# Patient Record
Sex: MALE | Race: WHITE | NOT HISPANIC OR LATINO | Employment: OTHER | URBAN - METROPOLITAN AREA
[De-identification: names, ages, dates, MRNs, and addresses within clinical notes are randomized per-mention and may not be internally consistent; named-entity substitution may affect disease eponyms.]

---

## 2016-12-31 PROCEDURE — 99000 SPECIMEN HANDLING OFFICE-LAB: CPT | Performed by: FAMILY MEDICINE

## 2016-12-31 PROCEDURE — 82274 ASSAY TEST FOR BLOOD FECAL: CPT | Mod: 90 | Performed by: FAMILY MEDICINE

## 2017-01-02 ENCOUNTER — HOSPITAL ENCOUNTER (OUTPATIENT)
Dept: OCCUPATIONAL THERAPY | Facility: CLINIC | Age: 72
Setting detail: THERAPIES SERIES
End: 2017-01-02
Attending: INTERNAL MEDICINE
Payer: COMMERCIAL

## 2017-01-02 PROCEDURE — 97110 THERAPEUTIC EXERCISES: CPT | Mod: GO | Performed by: OCCUPATIONAL THERAPIST

## 2017-01-02 PROCEDURE — 97535 SELF CARE MNGMENT TRAINING: CPT | Mod: GO | Performed by: OCCUPATIONAL THERAPIST

## 2017-01-02 PROCEDURE — 40000125 ZZHC STATISTIC OT OUTPT VISIT: Performed by: OCCUPATIONAL THERAPIST

## 2017-01-02 PROCEDURE — 97530 THERAPEUTIC ACTIVITIES: CPT | Mod: GO | Performed by: OCCUPATIONAL THERAPIST

## 2017-01-03 DIAGNOSIS — Z12.11 COLON CANCER SCREENING: ICD-10-CM

## 2017-01-03 LAB — HEMOCCULT STL QL IA: NEGATIVE

## 2017-01-09 ENCOUNTER — HOSPITAL ENCOUNTER (OUTPATIENT)
Dept: OCCUPATIONAL THERAPY | Facility: CLINIC | Age: 72
Setting detail: THERAPIES SERIES
End: 2017-01-09
Attending: INTERNAL MEDICINE
Payer: COMMERCIAL

## 2017-01-09 PROCEDURE — 40000125 ZZHC STATISTIC OT OUTPT VISIT: Performed by: OCCUPATIONAL THERAPIST

## 2017-01-09 PROCEDURE — 97110 THERAPEUTIC EXERCISES: CPT | Mod: GO | Performed by: OCCUPATIONAL THERAPIST

## 2017-02-07 ENCOUNTER — TELEPHONE (OUTPATIENT)
Dept: FAMILY MEDICINE | Facility: CLINIC | Age: 72
End: 2017-02-07

## 2017-02-07 DIAGNOSIS — I63.9 CVA (CEREBRAL VASCULAR ACCIDENT) (H): Primary | ICD-10-CM

## 2017-02-07 RX ORDER — LISINOPRIL 10 MG/1
10 TABLET ORAL DAILY
Qty: 90 TABLET | Refills: 0 | Status: SHIPPED | OUTPATIENT
Start: 2017-02-07 | End: 2017-05-09

## 2017-02-07 RX ORDER — CLOPIDOGREL BISULFATE 75 MG/1
75 TABLET ORAL DAILY
Qty: 90 TABLET | Refills: 1 | Status: SHIPPED | OUTPATIENT
Start: 2017-02-07 | End: 2017-05-09

## 2017-02-07 RX ORDER — ATORVASTATIN CALCIUM 20 MG/1
20 TABLET, FILM COATED ORAL DAILY
Qty: 90 TABLET | Refills: 0 | Status: SHIPPED | OUTPATIENT
Start: 2017-02-07 | End: 2017-05-09

## 2017-05-08 DIAGNOSIS — I63.9 CVA (CEREBRAL VASCULAR ACCIDENT) (H): ICD-10-CM

## 2017-05-08 NOTE — TELEPHONE ENCOUNTER
Pending Prescriptions:                       Disp   Refills    lisinopril (PRINIVIL/ZESTRIL) 10 MG table*90 tab*0            Sig: TAKE ONE TABLET BY MOUTH ONE TIME DAILY           Last Written Prescription Date: 02/07/2017  Last Fill Quantity: 90, # refills: 0  Last Office Visit with G, P or [facility] prescribing provider: 12/19/2016       Potassium   Date Value Ref Range Status   12/19/2016 4.4 3.4 - 5.3 mmol/L Final     Creatinine   Date Value Ref Range Status   12/19/2016 1.07 0.66 - 1.25 mg/dL Final     BP Readings from Last 3 Encounters:   12/19/16 130/84   12/12/16 150/86   08/15/12 116/84     Bob MAHANT

## 2017-05-08 NOTE — TELEPHONE ENCOUNTER
LM pt should be seen for f/u   Jeannette Nagy RN      Lisinopril/HCTZ   Last Written Prescription Date: 2/7/17  Last Fill Quantity: 90, # refills: 0  Last Office Visit with [facility], P or health prescribing provider: 12/19/16       Potassium   Date Value Ref Range Status   12/19/2016 4.4 3.4 - 5.3 mmol/L Final     Creatinine   Date Value Ref Range Status   12/19/2016 1.07 0.66 - 1.25 mg/dL Final     BP Readings from Last 3 Encounters:   12/19/16 130/84   12/12/16 150/86   08/15/12 116/84

## 2017-05-09 ENCOUNTER — OFFICE VISIT (OUTPATIENT)
Dept: FAMILY MEDICINE | Facility: CLINIC | Age: 72
End: 2017-05-09
Payer: COMMERCIAL

## 2017-05-09 VITALS
HEART RATE: 66 BPM | SYSTOLIC BLOOD PRESSURE: 110 MMHG | BODY MASS INDEX: 31.35 KG/M2 | TEMPERATURE: 98.6 F | HEIGHT: 71 IN | DIASTOLIC BLOOD PRESSURE: 70 MMHG | OXYGEN SATURATION: 98 % | WEIGHT: 223.9 LBS

## 2017-05-09 DIAGNOSIS — I63.9 CRYPTOGENIC STROKE (H): ICD-10-CM

## 2017-05-09 DIAGNOSIS — I63.9 CEREBROVASCULAR ACCIDENT (CVA), UNSPECIFIED MECHANISM (H): Primary | ICD-10-CM

## 2017-05-09 DIAGNOSIS — I10 ESSENTIAL HYPERTENSION: ICD-10-CM

## 2017-05-09 PROCEDURE — 99214 OFFICE O/P EST MOD 30 MIN: CPT | Performed by: FAMILY MEDICINE

## 2017-05-09 PROCEDURE — 80048 BASIC METABOLIC PNL TOTAL CA: CPT | Performed by: FAMILY MEDICINE

## 2017-05-09 PROCEDURE — 36415 COLL VENOUS BLD VENIPUNCTURE: CPT | Performed by: FAMILY MEDICINE

## 2017-05-09 PROCEDURE — 84460 ALANINE AMINO (ALT) (SGPT): CPT | Performed by: FAMILY MEDICINE

## 2017-05-09 RX ORDER — CLOPIDOGREL BISULFATE 75 MG/1
75 TABLET ORAL DAILY
Qty: 90 TABLET | Refills: 4 | Status: SHIPPED | OUTPATIENT
Start: 2017-05-09 | End: 2018-07-30

## 2017-05-09 RX ORDER — LISINOPRIL 10 MG/1
10 TABLET ORAL DAILY
Qty: 90 TABLET | Refills: 4 | Status: SHIPPED | OUTPATIENT
Start: 2017-05-09 | End: 2018-07-30

## 2017-05-09 RX ORDER — LISINOPRIL 10 MG/1
TABLET ORAL
Qty: 90 TABLET | Refills: 0 | OUTPATIENT
Start: 2017-05-09

## 2017-05-09 RX ORDER — ATORVASTATIN CALCIUM 20 MG/1
20 TABLET, FILM COATED ORAL DAILY
Qty: 90 TABLET | Refills: 4 | Status: SHIPPED | OUTPATIENT
Start: 2017-05-09 | End: 2018-07-30

## 2017-05-09 NOTE — MR AVS SNAPSHOT
"              After Visit Summary   5/9/2017    John Riley    MRN: [id number]           Patient Information     Date Of Birth          1945        Visit Information        Provider Department      5/9/2017 10:45 AM Cortez Kenny MD [facility] [place]        Today's Diagnoses     Cerebrovascular accident (CVA), unspecified mechanism (H)    -  1    Cryptogenic stroke (H)        Essential hypertension           Follow-ups after your visit        Who to contact     If you have questions or need follow up information about today's clinic visit or your schedule please contact [facility] directly at [phone].  Normal or non-critical lab and imaging results will be communicated to you by MyChart, letter or phone within 4 business days after the clinic has received the results. If you do not hear from us within 7 days, please contact the clinic through [employer] or phone. If you have a critical or abnormal lab result, we will notify you by phone as soon as possible.  Submit refill requests through [employer] or call your pharmacy and they will forward the refill request to us. Please allow 3 business days for your refill to be completed.          Additional Information About Your Visit        MyChart Information     [employer] gives you secure access to your electronic health record. If you see a primary care provider, you can also send messages to your care team and make appointments. If you have questions, please call your primary care clinic.  If you do not have a primary care provider, please call [phone] and they will assist you.        Care EveryWhere ID     This is your Care EveryWhere ID. This could be used by other organizations to access your [place] medical records  [id number]        Your Vitals Were     Pulse Temperature Height Pulse Oximetry BMI (Body Mass Index)       66 98.6  F (37  C) (Oral) 5' 11\" (1.803 m) 98% 31.23 kg/m2        Blood Pressure from Last 3 " Encounters:   05/09/17 110/70   12/19/16 130/84   12/12/16 150/86    Weight from Last 3 Encounters:   05/09/17 223 lb 14.4 oz (101.6 kg)   12/19/16 223 lb 12.8 oz (101.5 kg)   12/10/16 225 lb (102.1 kg)              We Performed the Following     ALT     Basic metabolic panel          Where to get your medicines      These medications were sent to [id number] - [place], [place] - [address]  [address], [facility] [address]     Phone:  [phone]     atorvastatin 20 MG tablet    clopidogrel 75 MG tablet    lisinopril 10 MG tablet          Primary Care Provider Office Phone # Fax #    Cortez Kenny [phone]       [facility] [address]  [facility] [address]        Thank you!     Thank you for choosing [facility]  for your care. Our goal is always to provide you with excellent care. Hearing back from our patients is one way we can continue to improve our services. Please take a few minutes to complete the written survey that you may receive in the mail after your visit with us. Thank you!             Your Updated Medication List - Protect others around you: Learn how to safely use, store and throw away your medicines at www.disposemymeds.org.          This list is accurate as of: 5/9/17 11:59 PM.  Always use your most recent med list.                   Brand Name Dispense Instructions for use    atorvastatin 20 MG tablet    LIPITOR    90 tablet    Take 1 tablet (20 mg) by mouth daily       CALCIUM PO      Take 1 tablet by mouth daily       clopidogrel 75 MG tablet    PLAVIX    90 tablet    Take 1 tablet (75 mg) by mouth daily       COENZYME Q-10 PO      Take 1 capsule by mouth daily       LECITHIN PO      Take 1 capsule by mouth daily Reported on 5/9/2017       lisinopril 10 MG tablet    PRINIVIL/ZESTRIL    90 tablet    Take 1 tablet (10 mg) by mouth daily       multivitamin, therapeutic with minerals Tabs tablet      Take 1 tablet by mouth  daily       TURMERIC PO      Take 1 capsule by mouth daily       vitamin B complex with vitamin C Tabs tablet      Take 1 tablet by mouth daily       VITAMIN B-12 PO      Take 1 tablet by mouth daily       VITAMIN C PO      Take 1,000 mg by mouth daily       VITAMIN D (CHOLECALCIFEROL) PO      Take by mouth daily

## 2017-05-09 NOTE — PROGRESS NOTES
"  SUBJECTIVE:                                                    John Riley is a 72 year old male who presents to clinic today for the following health issues:    Patient presents with:  Recheck Medication    Patient here in follow up of stroke occurring on 12/10/16 and hospitalization at [facility].  Patient had sudden left upper extremity weakness and left facial droop with dysarthria and expressive aphasia.  Was on aspirin prior to stroke so transition to Plavix.  Patient has had resolution of neurologic symptoms. Doing well on Plavix without bleeding.    MRI brain 12/10/16  IMPRESSION:    1. Recent posterior right frontal infarct.  2. Diffuse cerebral volume loss and cerebral white matter changes  consistent with chronic small vessel ischemic disease.    Echocardiogram normal other than PFO noted.  Otherwise had normal CT angiogram of head and neck.    Patient Active Problem List   Diagnosis     CVA (cerebral vascular accident) (H)     Advanced directives, counseling/discussion     Cryptogenic stroke (H)     History reviewed. No pertinent surgical history.    Social History   Substance Use Topics     Smoking status: Never Smoker     Smokeless tobacco: Never Used     Alcohol use Yes     Family History   Problem Relation Age of Onset     HEART DISEASE Mother            ROS:  CV: NEGATIVE for chest pain, palpitations or peripheral edema  NEURO: NEGATIVE for weakness, dizziness or paresthesias    OBJECTIVE:                                                    /70 (BP Location: Right arm, Patient Position: Chair, Cuff Size: Adult Large)  Pulse 66  Temp 98.6  F (37  C) (Oral)  Ht 5' 11\" (1.803 m)  Wt 223 lb 14.4 oz (101.6 kg)  SpO2 98%  BMI 31.23 kg/m2Body mass index is 31.23 kg/(m^2).  GENERAL APPEARANCE: healthy, alert and no distress  RESP: lungs clear to auscultation - no rales, rhonchi or wheezes  CV: regular rates and rhythm and no murmur, click or rub  NEURO: Normal strength and tone, " mentation intact, speech normal and cranial nerves 2-12 intact     ASSESSMENT/PLAN:                                                    1. Cerebrovascular accident (CVA), unspecified mechanism (H)  Continue current medication with statin, Plavix.  - atorvastatin (LIPITOR) 20 MG tablet; Take 1 tablet (20 mg) by mouth daily  Dispense: 90 tablet; Refill: 4  - clopidogrel (PLAVIX) 75 MG tablet; Take 1 tablet (75 mg) by mouth daily  Dispense: 90 tablet; Refill: 4    2. Cryptogenic stroke (H)  Continue medication treatment.    3. Essential hypertension  Currently well controlled, continue current medication with lisinopril.  - Basic metabolic panel  - ALT  - lisinopril (PRINIVIL/ZESTRIL) 10 MG tablet; Take 1 tablet (10 mg) by mouth daily  Dispense: 90 tablet; Refill: 4     Cortez Kenny MD  [facility]

## 2017-05-09 NOTE — NURSING NOTE
"Chief Complaint   Patient presents with     Recheck Medication       Initial /70 (BP Location: Right arm, Patient Position: Chair, Cuff Size: Adult Large)  Pulse 66  Temp 98.6  F (37  C) (Oral)  Ht 5' 11\" (1.803 m)  Wt 223 lb 14.4 oz (101.6 kg)  SpO2 98%  BMI 31.23 kg/m2 Estimated body mass index is 31.23 kg/(m^2) as calculated from the following:    Height as of this encounter: 5' 11\" (1.803 m).    Weight as of this encounter: 223 lb 14.4 oz (101.6 kg).  Medication Reconciliation: complete   KAT Reynoso      "

## 2017-05-10 LAB
ALT SERPL W P-5'-P-CCNC: 33 U/L (ref 0–70)
ANION GAP SERPL CALCULATED.3IONS-SCNC: 12 MMOL/L (ref 3–14)
BUN SERPL-MCNC: 17 MG/DL (ref 7–30)
CALCIUM SERPL-MCNC: 9 MG/DL (ref 8.5–10.1)
CHLORIDE SERPL-SCNC: 109 MMOL/L (ref 94–109)
CO2 SERPL-SCNC: 21 MMOL/L (ref 20–32)
CREAT SERPL-MCNC: 0.94 MG/DL (ref 0.66–1.25)
GFR SERPL CREATININE-BSD FRML MDRD: 78 ML/MIN/1.7M2
GLUCOSE SERPL-MCNC: 75 MG/DL (ref 70–99)
POTASSIUM SERPL-SCNC: 4.5 MMOL/L (ref 3.4–5.3)
SODIUM SERPL-SCNC: 142 MMOL/L (ref 133–144)

## 2018-04-13 ENCOUNTER — TELEPHONE (OUTPATIENT)
Dept: FAMILY MEDICINE | Facility: CLINIC | Age: 73
End: 2018-04-13

## 2018-04-13 NOTE — TELEPHONE ENCOUNTER
Panel Management Review      Patient has the following on his problem list: None      Composite cancer screening  Chart review shows that this patient is due/due soon for the following Fecal Colorectal (FIT)  Summary:    Patient is due/failing the following:   FIT    Action needed:   Patient needs referral/order: Fit test    Type of outreach:    Sent letter.    Questions for provider review:    None                                                                                                                                    Meg Ordaz CMA       Chart routed to none .

## 2018-04-13 NOTE — LETTER
[facility]          [address] [place] [address]                                                                                                       [phone]      John Riley  [address] [facility] [address]      Dear John,    Our records indicate that you may be due for preventative health care services.  Please make an appointment or call to set up the following tests as recommended.  If you have already had this testing done at another clinic please contact us to help us update our records to reflect the preventative care that you have had.    Colon cancer screen (colonoscopy) - Recommended every ten years for everyone age 50 and older. Screening is done by a colonoscopy every 10 years starting at age 50 or earlier if you have a family history of colon cancer.  If you cannot or do not want to have a colonoscopy you can opt to do an annual fecal occult blood immunoassay test (FIT stool test). We strongly urge our patients to consider having a colonoscopy done, which is the best screening test available and only needs to be done every 10 years if normal.                                                                                                                                               Thank you for choosing [facility]. We appreciate the opportunity to serve you and look forward to supporting your healthcare needs in the future.    If you have any questions or concerns, please contact us at [phone]      Sincerely,      Cortez Kenny MD

## 2018-07-26 DIAGNOSIS — I10 ESSENTIAL HYPERTENSION: ICD-10-CM

## 2018-07-26 DIAGNOSIS — I63.9 CEREBROVASCULAR ACCIDENT (CVA), UNSPECIFIED MECHANISM (H): ICD-10-CM

## 2018-07-26 RX ORDER — ATORVASTATIN CALCIUM 20 MG/1
TABLET, FILM COATED ORAL
Qty: 90 TABLET | Refills: 3 | OUTPATIENT
Start: 2018-07-26

## 2018-07-26 RX ORDER — CLOPIDOGREL BISULFATE 75 MG/1
TABLET ORAL
Qty: 90 TABLET | Refills: 3 | OUTPATIENT
Start: 2018-07-26

## 2018-07-26 RX ORDER — LISINOPRIL 10 MG/1
10 TABLET ORAL DAILY
Qty: 90 TABLET | Refills: 4 | OUTPATIENT
Start: 2018-07-26

## 2018-07-26 NOTE — TELEPHONE ENCOUNTER
Spoke with pt and scheduled OV for 7/30/18.  Pt does not need a refill prior to appt  Freddy Serrano RN, BSN

## 2018-07-26 NOTE — TELEPHONE ENCOUNTER
"Requested Prescriptions   Pending Prescriptions Disp Refills     lisinopril (PRINIVIL/ZESTRIL) 10 MG tablet 90 tablet 4    Last Written Prescription Date:  05/19/2017  Last Fill Quantity: 90 tablet,  # refills: 4   Last office visit: 5/9/2017 with prescribing provider:  05/19/2017   Future Office Visit:     Sig: Take 1 tablet (10 mg) by mouth daily    ACE Inhibitors (Including Combos) Protocol Failed    7/26/2018  9:16 AM       Failed - Blood pressure under 140/90 in past 12 months    BP Readings from Last 3 Encounters:   05/09/17 110/70   12/19/16 130/84   12/12/16 150/86                Failed - Recent (12 mo) or future (30 days) visit within the authorizing provider's specialty    Patient had office visit in the last 12 months or has a visit in the next 30 days with authorizing provider or within the authorizing provider's specialty.  See \"Patient Info\" tab in inbasket, or \"Choose Columns\" in Meds & Orders section of the refill encounter.           Failed - Normal serum creatinine on file in past 12 months    Recent Labs   Lab Test  05/09/17   1106   CR  0.94            Failed - Normal serum potassium on file in past 12 months    Recent Labs   Lab Test  05/09/17   1106   POTASSIUM  4.5            Passed - Patient is age 18 or older          Bob HARTMAN  "

## 2018-07-26 NOTE — TELEPHONE ENCOUNTER
"Requested Prescriptions   Pending Prescriptions Disp Refills     clopidogrel (PLAVIX) 75 MG tablet [Pharmacy Med Name: Clopidogrel Bisulfate Oral Tablet 75 MG] 90 tablet 3    Last Written Prescription Date:  05/09/2017  Last Fill Quantity: 90 tablet,  # refills: 4   Last office visit: 5/9/2017 with prescribing provider:  05/09/2017   Future Office Visit:     Sig: TAKE ONE TABLET BY MOUTH EVERY DAY    Plavix Failed    7/26/2018  7:41 AM       Failed - Normal HGB on file in past 12 months    Recent Labs   Lab Test  12/10/16   1905   HGB  16.9              Failed - Normal Platelets on file in past 12 months    Recent Labs   Lab Test  12/10/16   1905   PLT  235              Failed - Recent (12 mo) or future (30 days) visit within the authorizing provider's specialty    Patient had office visit in the last 12 months or has a visit in the next 30 days with authorizing provider or within the authorizing provider's specialty.  See \"Patient Info\" tab in [employer], or \"Choose Columns\" in Meds & Orders section of the refill encounter.           Passed - No active PPI on record unless is Protonix       Passed - Patient is age 18 or older              atorvastatin (LIPITOR) 20 MG tablet [Pharmacy Med Name: Atorvastatin Calcium Oral Tablet 20 MG] 90 tablet 3    Last Written Prescription Date:  05/09/2017  Last Fill Quantity: 90 tablet,  # refills: 4   Last office visit: 5/9/2017 with prescribing provider:  05/09/2017   Future Office Visit:     Sig: TAKE ONE TABLET BY MOUTH EVERY DAY    Statins Protocol Failed    7/26/2018  7:41 AM       Failed - LDL on file in past 12 months    Recent Labs   Lab Test  12/19/16   1124   LDL  98            Failed - Recent (12 mo) or future (30 days) visit within the authorizing provider's specialty    Patient had office visit in the last 12 months or has a visit in the next 30 days with authorizing provider or within the authorizing provider's specialty.  See \"Patient Info\" tab in [employer], or " "\"Choose Columns\" in Meds & Orders section of the refill encounter.           Passed - No abnormal creatine kinase in past 12 months    No lab results found.            Passed - Patient is age 18 or older          Bob MAHANT  "

## 2018-07-30 ENCOUNTER — OFFICE VISIT (OUTPATIENT)
Dept: FAMILY MEDICINE | Facility: CLINIC | Age: 73
End: 2018-07-30
Payer: COMMERCIAL

## 2018-07-30 VITALS
SYSTOLIC BLOOD PRESSURE: 120 MMHG | WEIGHT: 218.2 LBS | BODY MASS INDEX: 30.55 KG/M2 | HEART RATE: 71 BPM | HEIGHT: 71 IN | DIASTOLIC BLOOD PRESSURE: 72 MMHG | OXYGEN SATURATION: 97 %

## 2018-07-30 DIAGNOSIS — H61.22 IMPACTED CERUMEN OF LEFT EAR: ICD-10-CM

## 2018-07-30 DIAGNOSIS — I10 ESSENTIAL HYPERTENSION: Primary | ICD-10-CM

## 2018-07-30 DIAGNOSIS — I63.9 CEREBROVASCULAR ACCIDENT (CVA), UNSPECIFIED MECHANISM (H): ICD-10-CM

## 2018-07-30 DIAGNOSIS — Z12.11 COLON CANCER SCREENING: ICD-10-CM

## 2018-07-30 DIAGNOSIS — I63.9 CRYPTOGENIC STROKE (H): ICD-10-CM

## 2018-07-30 DIAGNOSIS — Z23 NEED FOR PNEUMOCOCCAL VACCINATION: ICD-10-CM

## 2018-07-30 LAB
ERYTHROCYTE [DISTWIDTH] IN BLOOD BY AUTOMATED COUNT: 12.2 % (ref 10–15)
HCT VFR BLD AUTO: 45.7 % (ref 40–53)
HGB BLD-MCNC: 15.7 G/DL (ref 13.3–17.7)
MCH RBC QN AUTO: 34.3 PG (ref 26.5–33)
MCHC RBC AUTO-ENTMCNC: 34.4 G/DL (ref 31.5–36.5)
MCV RBC AUTO: 100 FL (ref 78–100)
PLATELET # BLD AUTO: 232 10E9/L (ref 150–450)
RBC # BLD AUTO: 4.58 10E12/L (ref 4.4–5.9)
WBC # BLD AUTO: 5.6 10E9/L (ref 4–11)

## 2018-07-30 PROCEDURE — 85027 COMPLETE CBC AUTOMATED: CPT | Performed by: FAMILY MEDICINE

## 2018-07-30 PROCEDURE — 99214 OFFICE O/P EST MOD 30 MIN: CPT | Mod: 25 | Performed by: FAMILY MEDICINE

## 2018-07-30 PROCEDURE — G0009 ADMIN PNEUMOCOCCAL VACCINE: HCPCS | Performed by: FAMILY MEDICINE

## 2018-07-30 PROCEDURE — 84460 ALANINE AMINO (ALT) (SGPT): CPT | Performed by: FAMILY MEDICINE

## 2018-07-30 PROCEDURE — 36415 COLL VENOUS BLD VENIPUNCTURE: CPT | Performed by: FAMILY MEDICINE

## 2018-07-30 PROCEDURE — 80048 BASIC METABOLIC PNL TOTAL CA: CPT | Performed by: FAMILY MEDICINE

## 2018-07-30 PROCEDURE — 90670 PCV13 VACCINE IM: CPT | Performed by: FAMILY MEDICINE

## 2018-07-30 PROCEDURE — 83721 ASSAY OF BLOOD LIPOPROTEIN: CPT | Performed by: FAMILY MEDICINE

## 2018-07-30 RX ORDER — CLOPIDOGREL BISULFATE 75 MG/1
75 TABLET ORAL DAILY
Qty: 90 TABLET | Refills: 4 | Status: SHIPPED | OUTPATIENT
Start: 2018-07-30 | End: 2019-07-29

## 2018-07-30 RX ORDER — LISINOPRIL 10 MG/1
10 TABLET ORAL DAILY
Qty: 90 TABLET | Refills: 4 | Status: SHIPPED | OUTPATIENT
Start: 2018-07-30 | End: 2019-07-29

## 2018-07-30 RX ORDER — ATORVASTATIN CALCIUM 20 MG/1
20 TABLET, FILM COATED ORAL DAILY
Qty: 90 TABLET | Refills: 4 | Status: SHIPPED | OUTPATIENT
Start: 2018-07-30 | End: 2019-07-29

## 2018-07-30 NOTE — NURSING NOTE
Screening Questionnaire for Adult Immunization    Are you sick today?   No   Do you have allergies to medications, food, a vaccine component or latex?   No   Have you ever had a serious reaction after receiving a vaccination?   No   Do you have a long-term health problem with heart disease, lung disease, asthma, kidney disease, metabolic disease (e.g. diabetes), anemia, or other blood disorder?   No   Do you have cancer, leukemia, HIV/AIDS, or any other immune system problem?   No   In the past 3 months, have you taken medications that affect  your immune system, such as prednisone, other steroids, or anticancer drugs; drugs for the treatment of rheumatoid arthritis, Crohn s disease, or psoriasis; or have you had radiation treatments?   No   Have you had a seizure, or a brain or other nervous system problem?   No   During the past year, have you received a transfusion of blood or blood     products, or been given immune (gamma) globulin or antiviral drug?   No   For women: Are you pregnant or is there a chance you could become        pregnant during the next month?   No   Have you received any vaccinations in the past 4 weeks?   No     Immunization questionnaire answers were all negative.      MNVFC doesn't apply on this patient    Per orders of Dr. Kenny, injection of Prevnar 13 given by Agnes Reed. Patient instructed to remain in clinic for 20 minutes afterwards, and to report any adverse reaction to me immediately.       Screening performed by Agnes Reed on 7/30/2018 at 10:16 AM.

## 2018-07-30 NOTE — MR AVS SNAPSHOT
After Visit Summary   7/30/2018    John Riley    MRN: [id number]           Patient Information     Date Of Birth          1945        Visit Information        Provider Department      7/30/2018 9:40 AM Cortez Kenny MD [facility]        Today's Diagnoses     Essential hypertension    -  1    Cerebrovascular accident (CVA), unspecified mechanism (H)        Cryptogenic stroke (H)        Colon cancer screening        Need for pneumococcal vaccination        Impacted cerumen of left ear           Follow-ups after your visit        Future tests that were ordered for you today     Open Future Orders        Priority Expected Expires Ordered    Fecal colorectal cancer screen FIT Routine 8/20/2018 10/22/2018 7/30/2018            Who to contact     If you have questions or need follow up information about today's clinic visit or your schedule please contact [facility] directly at [phone].  Normal or non-critical lab and imaging results will be communicated to you by [employer], letter or phone within 4 business days after the clinic has received the results. If you do not hear from us within 7 days, please contact the clinic through [employer] or phone. If you have a critical or abnormal lab result, we will notify you by phone as soon as possible.  Submit refill requests through "[employer] or call your pharmacy and they will forward the refill request to us. Please allow 3 business days for your refill to be completed.          Additional Information About Your Visit        MyChart Information     [employer] gives you secure access to your electronic health record. If you see a primary care provider, you can also send messages to your care team and make appointments. If you have questions, please call your primary care clinic.  If you do not have a primary care provider, please call [phone] and they will assist you.        Care EveryWhere ID     This is your Care  "EveryWhere ID. This could be used by other organizations to access your [place] medical records  [id number]        Your Vitals Were     Pulse Height Pulse Oximetry BMI (Body Mass Index)          71 5' 11\" (1.803 m) 97% 30.43 kg/m2         Blood Pressure from Last 3 Encounters:   07/30/18 120/72   05/09/17 110/70   12/19/16 130/84    Weight from Last 3 Encounters:   07/30/18 218 lb 3.2 oz (99 kg)   05/09/17 223 lb 14.4 oz (101.6 kg)   12/19/16 223 lb 12.8 oz (101.5 kg)              We Performed the Following     ALT     Basic metabolic panel     CBC with platelets     LDL cholesterol direct     PNEUMOCOCCAL CONJ VACCINE 13 VALENT IM          Where to get your medicines      These medications were sent to [id number] - [place], [place] - [address]  [address], [facility] [address]     Phone:  [phone]     atorvastatin 20 MG tablet    clopidogrel 75 MG tablet    lisinopril 10 MG tablet          Primary Care Provider Office Phone # Fax #    Cortez Kenny [phone] [address]  [facility] [address]        Equal Access to Services     DORENE MEZA AH: Hadii meaghan ku hadasho Soomaali, waaxda luqadaha, qaybta kaalmada adeegyada, nilesh ram haytorin minna marin. So [facility] [phone].    ATENCIÓN: Si habla español, tiene a bingham disposición servicios gratuitos de asistencia lingüística. Llame al [phone].    We comply with applicable federal civil rights laws and [place] laws. We do not discriminate on the basis of race, color, national origin, age, disability, sex, sexual orientation, or gender identity.            Thank you!     Thank you for choosing [facility]  for your care. Our goal is always to provide you with excellent care. Hearing back from our patients is one way we can continue to improve our services. Please take a few minutes to complete the written survey that you may receive in the mail after your visit with us. Thank you!      "        Your Updated Medication List - Protect others around you: Learn how to safely use, store and throw away your medicines at www.disposemymeds.org.          This list is accurate as of 7/30/18 10:01 AM.  Always use your most recent med list.                   Brand Name Dispense Instructions for use Diagnosis    atorvastatin 20 MG tablet    LIPITOR    90 tablet    Take 1 tablet (20 mg) by mouth daily    Cerebrovascular accident (CVA), unspecified mechanism (H)       CALCIUM PO      Take 1 tablet by mouth daily        clopidogrel 75 MG tablet    PLAVIX    90 tablet    Take 1 tablet (75 mg) by mouth daily    Cerebrovascular accident (CVA), unspecified mechanism (H)       COENZYME Q-10 PO      Take 1 capsule by mouth daily        LECITHIN PO      Take 1 capsule by mouth daily Reported on 5/9/2017        lisinopril 10 MG tablet    PRINIVIL/ZESTRIL    90 tablet    Take 1 tablet (10 mg) by mouth daily    Essential hypertension       multivitamin, therapeutic with minerals Tabs tablet      Take 1 tablet by mouth daily        TURMERIC PO      Take 1 capsule by mouth daily        vitamin B complex with vitamin C Tabs tablet      Take 1 tablet by mouth daily        VITAMIN B-12 PO      Take 1 tablet by mouth daily        VITAMIN C PO      Take 1,000 mg by mouth daily        VITAMIN D (CHOLECALCIFEROL) PO      Take by mouth daily

## 2018-07-30 NOTE — PROGRESS NOTES
SUBJECTIVE:   John Riley is a 73 year old male who presents to clinic today for the following health issues:    Patient with history of stroke, occurring on 12/10/16.  Patient had sudden left upper extremity weakness and left facial droop with dysarthria and expressive aphasia.  Was on aspirin prior to stroke so transition to Plavix. Taking statin as well. Patient has had resolution of neurologic symptoms. Doing well on Plavix without bleeding. Echocardiogram normal other than PFO noted.  Otherwise had normal CT angiogram of head and neck.     MRI brain 12/10/16  IMPRESSION:    1. Recent posterior right frontal infarct.  2. Diffuse cerebral volume loss and cerebral white matter changes  consistent with chronic small vessel ischemic disease.    Patient with history of hypertension. Well controlled with lisinopril.     Patient's ears feel plugged with wax, which causes decreased/muffled hearing.      Problem list and histories reviewed & adjusted, as indicated.  Additional history: as documented    Patient Active Problem List   Diagnosis     CVA (cerebral vascular accident) (H)     Advanced directives, counseling/discussion     Cryptogenic stroke (H)     Benign essential hypertension     History reviewed. No pertinent surgical history.    Social History   Substance Use Topics     Smoking status: Never Smoker     Smokeless tobacco: Never Used     Alcohol use Yes     Family History   Problem Relation Age of Onset     HEART DISEASE Mother            Reviewed and updated as needed this visit by clinical staff  Tobacco  Allergies  Meds  Problems  Med Hx  Surg Hx  Fam Hx  Soc Hx        Reviewed and updated as needed this visit by Provider  Allergies  Meds  Problems         ROS:  ENT/MOUTH: as noted above   RESP: NEGATIVE for significant cough or SOB  CV: NEGATIVE for chest pain, palpitations or peripheral edema  NEURO: NEGATIVE for weakness, dizziness or paresthesias    This document serves as a record of  "the services and decisions personally performed and made by Cortez Kenny MD. It was created on his behalf by Brenda Lemon, a trained medical scribe. The creation of this document is based on the provider's statements to the medical scribe.  Brenda Lemon 9:48 AM July 30, 2018    OBJECTIVE:     /72 (BP Location: Right arm, Patient Position: Chair, Cuff Size: Adult Regular)  Pulse 71  Ht 1.803 m (5' 11\")  Wt 99 kg (218 lb 3.2 oz)  SpO2 97%  BMI 30.43 kg/m2  Body mass index is 30.43 kg/(m^2).  GENERAL: healthy, alert and no distress  EYES: Eyes grossly normal to inspection, PERRL and conjunctivae and sclerae normal  HENT: left ear impacted with cerumen, otherwise ear canals and TM's normal, nose and mouth without ulcers or lesions  NECK: no adenopathy, no asymmetry, masses, or scars  RESP: lungs clear to auscultation - no rales, rhonchi or wheezes  CV: regular rate and rhythm, normal S1 S2, no S3 or S4, no murmur, click or rub, no peripheral edema and peripheral pulses strong    Cerumen removed left side ear canal via lavage.    Diagnostic Test Results:  No results found for this or any previous visit (from the past 24 hour(s)).    ASSESSMENT/PLAN:     1. Essential hypertension  Well controlled. Continue current medication.   - Basic metabolic panel  - lisinopril (PRINIVIL/ZESTRIL) 10 MG tablet; Take 1 tablet (10 mg) by mouth daily  Dispense: 90 tablet; Refill: 4  - CBC with platelets    2. Cerebrovascular accident (CVA), unspecified mechanism (H)  Continue current medication with Plavix and statin.   - ALT  - Basic metabolic panel  - clopidogrel (PLAVIX) 75 MG tablet; Take 1 tablet (75 mg) by mouth daily  Dispense: 90 tablet; Refill: 4  - atorvastatin (LIPITOR) 20 MG tablet; Take 1 tablet (20 mg) by mouth daily  Dispense: 90 tablet; Refill: 4  - LDL cholesterol direct    3. Cryptogenic stroke (H)    4. Colon cancer screening  Discussed options for colon cancer screening and recommend colonoscopy, " flexible sigmoidoscopy with fecal occult blood testing, or yearly fecal occult blood testing.  Patient prefers FIT test.  - Fecal colorectal cancer screen FIT; Future    5. Need for pneumococcal vaccination  - PNEUMOCOCCAL CONJ VACCINE 13 VALENT IM    6. Impacted cerumen of left ear    The information in this document, created by the medical scribe for me, accurately reflects the services I personally performed and the decisions made by me. I have reviewed and approved this document for accuracy prior to leaving the patient care area.  July 30, 2018 10:02 AM    Cortez Kenny MD  [facility]

## 2018-07-31 LAB
ALT SERPL W P-5'-P-CCNC: 38 U/L (ref 0–70)
ANION GAP SERPL CALCULATED.3IONS-SCNC: 8 MMOL/L (ref 3–14)
BUN SERPL-MCNC: 16 MG/DL (ref 7–30)
CALCIUM SERPL-MCNC: 8.6 MG/DL (ref 8.5–10.1)
CHLORIDE SERPL-SCNC: 107 MMOL/L (ref 94–109)
CO2 SERPL-SCNC: 26 MMOL/L (ref 20–32)
CREAT SERPL-MCNC: 0.98 MG/DL (ref 0.66–1.25)
GFR SERPL CREATININE-BSD FRML MDRD: 75 ML/MIN/1.7M2
GLUCOSE SERPL-MCNC: 73 MG/DL (ref 70–99)
LDLC SERPL DIRECT ASSAY-MCNC: 70 MG/DL
POTASSIUM SERPL-SCNC: 4.8 MMOL/L (ref 3.4–5.3)
SODIUM SERPL-SCNC: 141 MMOL/L (ref 133–144)

## 2018-08-02 DIAGNOSIS — Z12.11 COLON CANCER SCREENING: ICD-10-CM

## 2018-08-02 PROCEDURE — 82274 ASSAY TEST FOR BLOOD FECAL: CPT | Performed by: FAMILY MEDICINE

## 2018-08-05 LAB — HEMOCCULT STL QL IA: NEGATIVE

## 2018-12-05 ENCOUNTER — OFFICE VISIT (OUTPATIENT)
Dept: DERMATOLOGY | Facility: CLINIC | Age: 73
End: 2018-12-05
Payer: COMMERCIAL

## 2018-12-05 VITALS — OXYGEN SATURATION: 98 % | SYSTOLIC BLOOD PRESSURE: 133 MMHG | DIASTOLIC BLOOD PRESSURE: 82 MMHG | HEART RATE: 64 BPM

## 2018-12-05 DIAGNOSIS — D48.5 NEOPLASM OF UNCERTAIN BEHAVIOR OF SKIN: Primary | ICD-10-CM

## 2018-12-05 DIAGNOSIS — Z85.828 HISTORY OF SKIN CANCER: ICD-10-CM

## 2018-12-05 DIAGNOSIS — L57.8 SOLAR ELASTOSIS: ICD-10-CM

## 2018-12-05 DIAGNOSIS — L57.0 AK (ACTINIC KERATOSIS): ICD-10-CM

## 2018-12-05 PROCEDURE — 69100 BIOPSY OF EXTERNAL EAR: CPT | Mod: 59 | Performed by: PHYSICIAN ASSISTANT

## 2018-12-05 PROCEDURE — 99203 OFFICE O/P NEW LOW 30 MIN: CPT | Mod: 25 | Performed by: PHYSICIAN ASSISTANT

## 2018-12-05 PROCEDURE — 88305 TISSUE EXAM BY PATHOLOGIST: CPT | Mod: TC | Performed by: PHYSICIAN ASSISTANT

## 2018-12-05 PROCEDURE — 17003 DESTRUCT PREMALG LES 2-14: CPT | Performed by: PHYSICIAN ASSISTANT

## 2018-12-05 PROCEDURE — 17000 DESTRUCT PREMALG LESION: CPT | Mod: 51 | Performed by: PHYSICIAN ASSISTANT

## 2018-12-05 NOTE — PROGRESS NOTES
HPI:  John Riley is a 73 year old male patient here today for non healing lesion on left ear .  Patient states this has been present for months.  Patient reports the following symptoms: does not heal .  Patient reports the following previous treatments: none.  Patient reports the following modifying factors: none.  Associated symptoms: none. Pt also had a BCC on left ear in the 90s. Pt thinks the lesion has recurred as it is crusting over and not healing. Has some pink scaly spots on face for a while as well. No treatment for these..  Patient has no other skin complaints today.  Remainder of the HPI, Meds, PMH, Allergies, FH, and SH was reviewed in chart.    Pertinent Hx:   BCC  Past Medical History:   Diagnosis Date     Basal cell carcinoma     early 90's       History reviewed. No pertinent surgical history.     Family History   Problem Relation Age of Onset     Heart Disease Mother        Social History     Social History     Marital status:      Spouse name: N/A     Number of children: N/A     Years of education: N/A     Occupational History     Not on file.     Social History Main Topics     Smoking status: Never Smoker     Smokeless tobacco: Never Used     Alcohol use Yes     Drug use: No     Sexual activity: Yes     Partners: Female     Other Topics Concern     Parent/Sibling W/ Cabg, Mi Or Angioplasty Before 65f 55m? No     Social History Narrative       Outpatient Encounter Prescriptions as of 12/5/2018   Medication Sig Dispense Refill     Ascorbic Acid (VITAMIN C PO) Take 1,000 mg by mouth daily       atorvastatin (LIPITOR) 20 MG tablet Take 1 tablet (20 mg) by mouth daily 90 tablet 4     CALCIUM PO Take 1 tablet by mouth daily       clopidogrel (PLAVIX) 75 MG tablet Take 1 tablet (75 mg) by mouth daily 90 tablet 4     COENZYME Q-10 PO Take 1 capsule by mouth daily       Cyanocobalamin (VITAMIN B-12 PO) Take 1 tablet by mouth daily       LECITHIN PO Take 1 capsule by mouth daily Reported on  5/9/2017       lisinopril (PRINIVIL/ZESTRIL) 10 MG tablet Take 1 tablet (10 mg) by mouth daily 90 tablet 4     multivitamin, therapeutic with minerals (THERA-VIT-M) TABS tablet Take 1 tablet by mouth daily       TURMERIC PO Take 1 capsule by mouth daily       vitamin B complex with vitamin C (VITAMIN  B COMPLEX) TABS tablet Take 1 tablet by mouth daily       VITAMIN D, CHOLECALCIFEROL, PO Take by mouth daily       No facility-administered encounter medications on file as of 12/5/2018.        Review Of Systems:  Skin: As above  Eyes: negative  Ears/Nose/Throat: negative  Respiratory: No shortness of breath, dyspnea on exertion, cough, or hemoptysis  Cardiovascular: negative  Gastrointestinal: negative  Genitourinary: negative  Musculoskeletal: negative  Neurologic: negative  Psychiatric: negative  Hematologic/Lymphatic/Immunologic: negative  Endocrine: negative      Objective:     /82  Pulse 64  SpO2 98%  Eyes: Conjunctivae/lids: Normal   ENT: Lips:  Normal  MSK: Normal  Cardiovascular: Peripheral edema none  Pulm: Breathing Normal  Neuro/Psych: Orientation: Normal; Mood/Affect: Normal, NAD, WDWN  Pt accompanied by: self  Following areas examined: face, neck, hands, pt defers fullbody today  Veliz skin type:ii   Findings:  Pink scaly macules on forehead and temples x 8  Pink and hypopigmented smooth patch with area of crust on left superior antihelix 1.0cm  Pink scaly macule on right preauricular 0.6cm  Rhytides, hypo/hyperpigmentation, and atrophy    Assessment and Plan:  1) Neoplasm of uncertain behavior on right preauricular 0.6cm  BCC vs SCC  TANGENTIAL BIOPSY:  After consent, anesthesia with LEC and prep, tangential biopsy performed.  No complications and routine wound care.  May grow back and will get a scar. Based on lesion type may need to completely remove lesion. Patient will be notified in 7-10 days of results. Wound care directions given.    2) Neoplasm of uncertain behavior on left superior  antihelix 1.0cm  BCC vs SCC  TANGENTIAL BIOPSY:  After consent, anesthesia with LEC and prep, tangential biopsy performed.  No complications and routine wound care.  May grow back and will get a scar. Based on lesion type may need to completely remove lesion. Patient will be notified in 7-10 days of results. Wound care directions given.    3) actinic keratoses x 8 and solar elastosis  LN2 for 5 seconds x 2. Discussed AE include hypopigmentation (white spot) and recurrence. Follow up in 2-3 months to recheck lesions. There is a 0.025%-20% chance that AKs can develop into a SCC.   Discussed treating with LN2 vs PDT vs Efudex. Pt elected LN2    4) history of BCC  Signs and Symptoms of non-melanoma skin cancer and ABCDEs of melanoma reviewed with patient. Patient encouraged to perform monthly self skin exams and educated on how to perform them. UV precautions reviewed with patient. Patient was asked about new or changing moles/lesions on body.   Wear a sunscreen with at least SPF 30 on your face, ears, neck and V of the chest daily. Wear sunscreen on other areas of the body if those areas are exposed to the sun throughout the day. Sunscreens can contain physical and/or chemical blockers. Physical blockers are less likely to clog pores, these include zinc oxide and titanium dioxide. Reapply every two hour and after swimming. Sunscreen examples include Neutrogena, CeraVe, Blue Lizard, Elta MD and many others.    Proper skin care from [facility]:    -Eliminate harsh soaps as they strip the natural oils from the skin, often resulting in dry itchy skin ( i.e. Dial, Zest, [place])  -Use mild soaps such as Cetaphil or Dove Sensitive Skin in the shower. You do not need to use soap on arms, legs, and trunk every time you shower unless visibly soiled.   -Avoid hot or cold showers.  -After showering, lightly dry off and apply moisturizing within 2-3 minutes. This will help trap moisture in the skin.   -Aggressive  use of a moisturizer at least 1-2 times a day to the entire body (including -Vanicream, Cetaphil, Aquaphor or Cerave) and moisturize hands after every washing.  -We recommend using moisturizers that come in a tub that needs to be scooped out, not a pump. This has more of an oil base. It will hold moisture in your skin much better than a water base moisturizer. The above recommended are non-pore clogging.         Follow up for FBE

## 2018-12-05 NOTE — MR AVS SNAPSHOT
After Visit Summary   12/5/2018    John Riley    MRN: [id number]           Patient Information     Date Of Birth          1945        Visit Information        Provider Department      12/5/2018 10:20 AM Amanda Armstrong PA-C [facility]        Today's Diagnoses     Neoplasm of uncertain behavior of skin    -  1    AK (actinic keratosis)        Solar elastosis        History of skin cancer          Care Instructions    Proper skin care from [facility]:    -Eliminate harsh soaps as they strip the natural oils from the skin, often resulting in dry itchy skin ( i.e. Dial, Zest, [place])  -Use mild soaps such as Cetaphil or Dove Sensitive Skin in the shower. You do not need to use soap on arms, legs, and trunk every time you shower unless visibly soiled.   -Avoid hot or cold showers.  -After showering, lightly dry off and apply moisturizing within 2-3 minutes. This will help trap moisture in the skin.   -Aggressive use of a moisturizer at least 1-2 times a day to the entire body (including -Vanicream, Cetaphil, Aquaphor or Cerave) and moisturize hands after every washing.  -We recommend using moisturizers that come in a tub that needs to be scooped out, not a pump. This has more of an oil base. It will hold moisture in your skin much better than a water base moisturizer. The above recommended are non-pore clogging.       Wear a sunscreen with at least SPF 30 on your face, ears, neck and V of the chest daily. Wear sunscreen on other areas of the body if those areas are exposed to the sun throughout the day. Sunscreens can contain physical and/or chemical blockers. Physical blockers are less likely to clog pores, these include zinc oxide and titanium dioxide. Reapply every two hour and after swimming. Sunscreen examples include Neutrogena, CeraVe, Blue Lizard, Elta MD and many others.    UV radiation  UVA radiation remains constant throughout the day  and throughout the year. It is a longer wavelength than UVB and therefore penetrates deeper into the skin leading to immediate and delayed tanning, photoaging, and skin cancer. 70-80% of UVA and UVB radiation occurs between the hours of 10am-2pm.  UVB radiation  UVB radiation causes the most harmful effects and is more significant during the summer months. However, snow and ice can reflect UVB radiation leading to skin damage during the winter months as well. UVB radiation is responsible for tanning, burning, inflammation, delayed erythema (pinkness), pigmentation (brown spots), and skin cancer.   Just because you do not burn or are not developing a tan does not mean that you are not damaging your skin. A 15 minute drive to and from work for 30 years an lead to chronic sun damage of the skin. It is important to wear a broad spectrum (both UVA and UVB) sunscreen EVERY day with at least 30 SPF. Apply to face, ears, neck and v of the chest as this is where most of our sun exposure is. Reapply sunscreen every two hours if you plan on being outside.   Pablito Peñaloza. Clinical Dermatology: A Color Guide to Diagnosis and Therapy. Elsevier, 2016.   WOUND CARE INSTRUCTIONS  FOR CRYOSURGERY        This area treated with liquid nitrogen will form a blister. You do not need to bandage the area until after the blister forms and breaks (which may be a few days).  When the blister breaks, begin daily dressing changes as follows:    1) Clean and dry the area with tap water using clean Q-tip or sterile gauze pad.    2) Apply Polysporin ointment or Bacitracin ointment over entire wound.  Do NOT use Neosporin ointment.    3) Cover the wound with a band-aid or sterile non-stick gauze pad and micropore paper tape.      REPEAT THESE INSTRUCTIONS AT LEAST ONCE A DAY UNTIL THE WOUND HAS COMPLETELY HEALED.        It is an old wives tale that a wound heals better when it is exposed to air and allowed to dry out. The wound will heal faster  with a better cosmetic result if it is kept moist with ointment and covered with a bandage.  Do not let the wound dry out.      IMPORTANT INFORMATION ON REVERSE SIDE    Supplies Needed:     *Cotton tipped applicators (Q-tips)   *Polysporin ointment or Bacitracin ointment (NOT NEOSPORIN)   *Band-aids, or non stick gauze pads and micropore paper tape                PATIENT INFORMATION    During the healing process you will notice a number of changes. All wounds develop a small halo of redness surrounding the wound.  This means healing is occurring. Severe itching with extensive redness usually indicates sensitivity to the ointment or bandage tape used to dress the wound.  You should call our office if this develops.      Swelling and/or discoloration around your surgical site is common, particularly when performed around the eye.    All wounds normally drain.  The larger the wound the more drainage there will be.  After 7-10 days, you will notice the wound beginning to shrink and new skin will begin to grow.  The wound is healed when you can see skin has formed over the entire area.  A healed wound has a healthy, shiny look to the surface and is red to dark pink in color to normalize.  Wounds may take approximately 4-6 weeks to heal.  Larger wounds may take 6-8 weeks.  After the wound is healed you may discontinue dressing changes.    You may experience a sensation of tightness as your wound heals. This is normal and will gradually subside.    Your healed wound may be sensitive to temperature changes. This sensitivity improves with time, but if you re having a lot of discomfort, try to avoid temperature extremes.    Patients frequently experience itching after their wound appears to have healed because of the continue healing under the skin.  Plain Vaseline will help relieve the itching.                      Wound Care Instructions     FOR SUPERFICIAL WOUNDS     [facility] [phone]    [place]  [facility] [phone]          AFTER 24 HOURS YOU SHOULD REMOVE THE BANDAGE AND BEGIN DAILY DRESSING CHANGES AS FOLLOWS:     1) Remove Dressing.     2) Clean and dry the area with tap water using a Q-tip or sterile gauze pad.     3) Apply Vaseline, Aquaphor, Polysporin ointment or Bacitracin ointment over entire wound.  Do NOT use Neosporin ointment.     4) Cover the wound with a band-aid, or a sterile non-stick gauze pad and micropore paper tape      REPEAT THESE INSTRUCTIONS AT LEAST ONCE A DAY UNTIL THE WOUND HAS COMPLETELY HEALED.    It is an old wives tale that a wound heals better when it is exposed to air and allowed to dry out. The wound will heal faster with a better cosmetic result if it is kept moist with ointment and covered with a bandage.    **Do not let the wound dry out.**      Supplies Needed:      *Cotton tipped applicators (Q-tips)    *Polysporin Ointment or Bacitracin Ointment (NOT NEOSPORIN)    *Band-aids or non-stick gauze pads and micropore paper tape.      PATIENT INFORMATION:    During the healing process you will notice a number of changes. All wounds develop a small halo of redness surrounding the wound.  This means healing is occurring. Severe itching with extensive redness usually indicates sensitivity to the ointment or bandage tape used to dress the wound.  You should call our office if this develops.      Swelling  and/or discoloration around your surgical site is common, particularly when performed around the eye.    All wounds normally drain.  The larger the wound the more drainage there will be.  After 7-10 days, you will notice the wound beginning to shrink and new skin will begin to grow.  The wound is healed when you can see skin has formed over the entire area.  A healed wound has a healthy, shiny look to the surface and is red to dark pink in color to normalize.  Wounds may take approximately 4-6 weeks to heal.  Larger wounds may take 6-8 weeks.  After the wound is healed  you may discontinue dressing changes.    You may experience a sensation of tightness as your wound heals. This is normal and will gradually subside.    Your healed wound may be sensitive to temperature changes. This sensitivity improves with time, but if you re having a lot of discomfort, try to avoid temperature extremes.    Patients frequently experience itching after their wound appears to have healed because of the continue healing under the skin.  Plain Vaseline will help relieve the itching.        POSSIBLE COMPLICATIONS    BLEEDIN. Leave the bandage in place.  2. Use tightly rolled up gauze or a cloth to apply direct pressure over the bandage for 30  minutes.  3. Reapply pressure for an additional 30 minutes if necessary  4. Use additional gauze and tape to maintain pressure once the bleeding has stopped.              Follow-ups after your visit        Who to contact     If you have questions or need follow up information about today's clinic visit or your schedule please contact [facility] directly at [phone].  Normal or non-critical lab and imaging results will be communicated to you by Suo Yihart, letter or phone within 4 business days after the clinic has received the results. If you do not hear from us within 7 days, please contact the clinic through [facility] or phone. If you have a critical or abnormal lab result, we will notify you by phone as soon as possible.  Submit refill requests through [employer] or call your pharmacy and they will forward the refill request to us. Please allow 3 business days for your refill to be completed.          Additional Information About Your Visit        [employer] Information     [employer] gives you secure access to your electronic health record. If you see a primary care provider, you can also send messages to your care team and make appointments. If you have questions, please call your primary care clinic.  If you do not have a primary care  provider, please call [phone] and they will assist you.        Care EveryWhere ID     This is your Care EveryWhere ID. This could be used by other organizations to access your [place] medical records  [id number]        Your Vitals Were     Pulse Pulse Oximetry                64 98%           Blood Pressure from Last 3 Encounters:   12/05/18 133/82   07/30/18 120/72   05/09/17 110/70    Weight from Last 3 Encounters:   07/30/18 99 kg (218 lb 3.2 oz)   05/09/17 101.6 kg (223 lb 14.4 oz)   12/19/16 101.5 kg (223 lb 12.8 oz)              We Performed the Following     BIOPSY SKIN/SUBQ/MUC MEM, EACH ADDTL LESION     BIOPSY SKIN/SUBQ/MUC MEM, SINGLE LESION     Dermatological path order and indications     DESTRUCT PREMALIGNANT LESION, 2-14     DESTRUCT PREMALIGNANT LESION, FIRST        Primary Care Provider Office Phone # Fax #    Cortez Kenny [phone] [id number]  [facility] [address]        Equal Access to Services     [facility]: Hadii aad ku hadasho Soomaali, waaxda luqadaha, qaybta kaalmada adeegyada, nilesh wilhelm . So [facility] [phone].    ATENCIÓN: Si habla español, tiene a bingham disposición servicios gratuitos de asistencia lingüística. Llame al [phone].    We comply with applicable federal civil rights laws and [place] laws. We do not discriminate on the basis of race, color, national origin, age, disability, sex, sexual orientation, or gender identity.            Thank you!     Thank you for choosing [facility]  for your care. Our goal is always to provide you with excellent care. Hearing back from our patients is one way we can continue to improve our services. Please take a few minutes to complete the written survey that you may receive in the mail after your visit with us. Thank you!             Your Updated Medication List - Protect others around you: Learn how to safely use, store and throw away your  medicines at www.disposemymeds.org.          This list is accurate as of 12/5/18 10:29 AM.  Always use your most recent med list.                   Brand Name Dispense Instructions for use Diagnosis    atorvastatin 20 MG tablet    LIPITOR    90 tablet    Take 1 tablet (20 mg) by mouth daily    Cerebrovascular accident (CVA), unspecified mechanism (H)       CALCIUM PO      Take 1 tablet by mouth daily        clopidogrel 75 MG tablet    PLAVIX    90 tablet    Take 1 tablet (75 mg) by mouth daily    Cerebrovascular accident (CVA), unspecified mechanism (H)       COENZYME Q-10 PO      Take 1 capsule by mouth daily        LECITHIN PO      Take 1 capsule by mouth daily Reported on 5/9/2017        lisinopril 10 MG tablet    PRINIVIL/ZESTRIL    90 tablet    Take 1 tablet (10 mg) by mouth daily    Essential hypertension       multivitamin w/minerals tablet      Take 1 tablet by mouth daily        TURMERIC PO      Take 1 capsule by mouth daily        vitamin B complex with vitamin C tablet      Take 1 tablet by mouth daily        VITAMIN B-12 PO      Take 1 tablet by mouth daily        VITAMIN C PO      Take 1,000 mg by mouth daily        VITAMIN D (CHOLECALCIFEROL) PO      Take by mouth daily

## 2018-12-05 NOTE — PATIENT INSTRUCTIONS
Proper skin care from [facility]:    -Eliminate harsh soaps as they strip the natural oils from the skin, often resulting in dry itchy skin ( i.e. Dial, Zest, Riri Spring)  -Use mild soaps such as Cetaphil or Dove Sensitive Skin in the shower. You do not need to use soap on arms, legs, and trunk every time you shower unless visibly soiled.   -Avoid hot or cold showers.  -After showering, lightly dry off and apply moisturizing within 2-3 minutes. This will help trap moisture in the skin.   -Aggressive use of a moisturizer at least 1-2 times a day to the entire body (including -Vanicream, Cetaphil, Aquaphor or Cerave) and moisturize hands after every washing.  -We recommend using moisturizers that come in a tub that needs to be scooped out, not a pump. This has more of an oil base. It will hold moisture in your skin much better than a water base moisturizer. The above recommended are non-pore clogging.       Wear a sunscreen with at least SPF 30 on your face, ears, neck and V of the chest daily. Wear sunscreen on other areas of the body if those areas are exposed to the sun throughout the day. Sunscreens can contain physical and/or chemical blockers. Physical blockers are less likely to clog pores, these include zinc oxide and titanium dioxide. Reapply every two hour and after swimming. Sunscreen examples include Neutrogena, CeraVe, Blue Lizard, Elta MD and many others.    UV radiation  UVA radiation remains constant throughout the day and throughout the year. It is a longer wavelength than UVB and therefore penetrates deeper into the skin leading to immediate and delayed tanning, photoaging, and skin cancer. 70-80% of UVA and UVB radiation occurs between the hours of 10am-2pm.  UVB radiation  UVB radiation causes the most harmful effects and is more significant during the summer months. However, snow and ice can reflect UVB radiation leading to skin damage during the winter months as well. UVB radiation is  responsible for tanning, burning, inflammation, delayed erythema (pinkness), pigmentation (brown spots), and skin cancer.   Just because you do not burn or are not developing a tan does not mean that you are not damaging your skin. A 15 minute drive to and from work for 30 years an lead to chronic sun damage of the skin. It is important to wear a broad spectrum (both UVA and UVB) sunscreen EVERY day with at least 30 SPF. Apply to face, ears, neck and v of the chest as this is where most of our sun exposure is. Reapply sunscreen every two hours if you plan on being outside.   Pablito Peñaloza. Clinical Dermatology: A Color Guide to Diagnosis and Therapy. Elsevier, 2016.   WOUND CARE INSTRUCTIONS  FOR CRYOSURGERY        This area treated with liquid nitrogen will form a blister. You do not need to bandage the area until after the blister forms and breaks (which may be a few days).  When the blister breaks, begin daily dressing changes as follows:    1) Clean and dry the area with tap water using clean Q-tip or sterile gauze pad.    2) Apply Polysporin ointment or Bacitracin ointment over entire wound.  Do NOT use Neosporin ointment.    3) Cover the wound with a band-aid or sterile non-stick gauze pad and micropore paper tape.      REPEAT THESE INSTRUCTIONS AT LEAST ONCE A DAY UNTIL THE WOUND HAS COMPLETELY HEALED.        It is an old wives tale that a wound heals better when it is exposed to air and allowed to dry out. The wound will heal faster with a better cosmetic result if it is kept moist with ointment and covered with a bandage.  Do not let the wound dry out.      IMPORTANT INFORMATION ON REVERSE SIDE    Supplies Needed:     *Cotton tipped applicators (Q-tips)   *Polysporin ointment or Bacitracin ointment (NOT NEOSPORIN)   *Band-aids, or non stick gauze pads and micropore paper tape                PATIENT INFORMATION    During the healing process you will notice a number of changes. All wounds develop a small  halo of redness surrounding the wound.  This means healing is occurring. Severe itching with extensive redness usually indicates sensitivity to the ointment or bandage tape used to dress the wound.  You should call our office if this develops.      Swelling and/or discoloration around your surgical site is common, particularly when performed around the eye.    All wounds normally drain.  The larger the wound the more drainage there will be.  After 7-10 days, you will notice the wound beginning to shrink and new skin will begin to grow.  The wound is healed when you can see skin has formed over the entire area.  A healed wound has a healthy, shiny look to the surface and is red to dark pink in color to normalize.  Wounds may take approximately 4-6 weeks to heal.  Larger wounds may take 6-8 weeks.  After the wound is healed you may discontinue dressing changes.    You may experience a sensation of tightness as your wound heals. This is normal and will gradually subside.    Your healed wound may be sensitive to temperature changes. This sensitivity improves with time, but if you re having a lot of discomfort, try to avoid temperature extremes.    Patients frequently experience itching after their wound appears to have healed because of the continue healing under the skin.  Plain Vaseline will help relieve the itching.                      Wound Care Instructions     FOR SUPERFICIAL WOUNDS     [facility] [phone]    [facility] [phone]          AFTER 24 HOURS YOU SHOULD REMOVE THE BANDAGE AND BEGIN DAILY DRESSING CHANGES AS FOLLOWS:     1) Remove Dressing.     2) Clean and dry the area with tap water using a Q-tip or sterile gauze pad.     3) Apply Vaseline, Aquaphor, Polysporin ointment or Bacitracin ointment over entire wound.  Do NOT use Neosporin ointment.     4) Cover the wound with a band-aid, or a sterile non-stick gauze pad and micropore paper tape      REPEAT THESE  INSTRUCTIONS AT LEAST ONCE A DAY UNTIL THE WOUND HAS COMPLETELY HEALED.    It is an old wives tale that a wound heals better when it is exposed to air and allowed to dry out. The wound will heal faster with a better cosmetic result if it is kept moist with ointment and covered with a bandage.    **Do not let the wound dry out.**      Supplies Needed:      *Cotton tipped applicators (Q-tips)    *Polysporin Ointment or Bacitracin Ointment (NOT NEOSPORIN)    *Band-aids or non-stick gauze pads and micropore paper tape.      PATIENT INFORMATION:    During the healing process you will notice a number of changes. All wounds develop a small halo of redness surrounding the wound.  This means healing is occurring. Severe itching with extensive redness usually indicates sensitivity to the ointment or bandage tape used to dress the wound.  You should call our office if this develops.      Swelling  and/or discoloration around your surgical site is common, particularly when performed around the eye.    All wounds normally drain.  The larger the wound the more drainage there will be.  After 7-10 days, you will notice the wound beginning to shrink and new skin will begin to grow.  The wound is healed when you can see skin has formed over the entire area.  A healed wound has a healthy, shiny look to the surface and is red to dark pink in color to normalize.  Wounds may take approximately 4-6 weeks to heal.  Larger wounds may take 6-8 weeks.  After the wound is healed you may discontinue dressing changes.    You may experience a sensation of tightness as your wound heals. This is normal and will gradually subside.    Your healed wound may be sensitive to temperature changes. This sensitivity improves with time, but if you re having a lot of discomfort, try to avoid temperature extremes.    Patients frequently experience itching after their wound appears to have healed because of the continue healing under the skin.  Plain Vaseline  will help relieve the itching.        POSSIBLE COMPLICATIONS    BLEEDIN. Leave the bandage in place.  2. Use tightly rolled up gauze or a cloth to apply direct pressure over the bandage for 30  minutes.  3. Reapply pressure for an additional 30 minutes if necessary  4. Use additional gauze and tape to maintain pressure once the bleeding has stopped.

## 2018-12-05 NOTE — LETTER
12/5/2018         RE: John Riley  [address] [address] [address]        Dear Colleague,    Thank you for referring your patient, John Riley, to the [facility]. Please see a copy of my visit note below.    HPI:  John Riley is a 73 year old male patient here today for non healing lesion on left ear .  Patient states this has been present for months.  Patient reports the following symptoms: does not heal .  Patient reports the following previous treatments: none.  Patient reports the following modifying factors: none.  Associated symptoms: none. Pt also had a BCC on left ear in the 90s. Pt thinks the lesion has recurred as it is crusting over and not healing. Has some pink scaly spots on face for a while as well. No treatment for these..  Patient has no other skin complaints today.  Remainder of the HPI, Meds, PMH, Allergies, FH, and SH was reviewed in chart.    Pertinent Hx:   BCC  Past Medical History:   Diagnosis Date     Basal cell carcinoma     early 90's       History reviewed. No pertinent surgical history.     Family History   Problem Relation Age of Onset     Heart Disease Mother        Social History     Social History     Marital status:      Spouse name: N/A     Number of children: N/A     Years of education: N/A     Occupational History     Not on file.     Social History Main Topics     Smoking status: Never Smoker     Smokeless tobacco: Never Used     Alcohol use Yes     Drug use: No     Sexual activity: Yes     Partners: Female     Other Topics Concern     Parent/Sibling W/ Cabg, Mi Or Angioplasty Before 65f 55m? No     Social History Narrative       Outpatient Encounter Prescriptions as of 12/5/2018   Medication Sig Dispense Refill     Ascorbic Acid (VITAMIN C PO) Take 1,000 mg by mouth daily       atorvastatin (LIPITOR) 20 MG tablet Take 1 tablet (20 mg) by mouth daily 90 tablet 4     CALCIUM PO Take 1 tablet by mouth daily        clopidogrel (PLAVIX) 75 MG tablet Take 1 tablet (75 mg) by mouth daily 90 tablet 4     COENZYME Q-10 PO Take 1 capsule by mouth daily       Cyanocobalamin (VITAMIN B-12 PO) Take 1 tablet by mouth daily       LECITHIN PO Take 1 capsule by mouth daily Reported on 5/9/2017       lisinopril (PRINIVIL/ZESTRIL) 10 MG tablet Take 1 tablet (10 mg) by mouth daily 90 tablet 4     multivitamin, therapeutic with minerals (THERA-VIT-M) TABS tablet Take 1 tablet by mouth daily       TURMERIC PO Take 1 capsule by mouth daily       vitamin B complex with vitamin C (VITAMIN  B COMPLEX) TABS tablet Take 1 tablet by mouth daily       VITAMIN D, CHOLECALCIFEROL, PO Take by mouth daily       No facility-administered encounter medications on file as of 12/5/2018.        Review Of Systems:  Skin: As above  Eyes: negative  Ears/Nose/Throat: negative  Respiratory: No shortness of breath, dyspnea on exertion, cough, or hemoptysis  Cardiovascular: negative  Gastrointestinal: negative  Genitourinary: negative  Musculoskeletal: negative  Neurologic: negative  Psychiatric: negative  Hematologic/Lymphatic/Immunologic: negative  Endocrine: negative      Objective:     /82  Pulse 64  SpO2 98%  Eyes: Conjunctivae/lids: Normal   ENT: Lips:  Normal  MSK: Normal  Cardiovascular: Peripheral edema none  Pulm: Breathing Normal  Neuro/Psych: Orientation: Normal; Mood/Affect: Normal, NAD, WDWN  Pt accompanied by: self  Following areas examined: face, neck, hands, pt defers fullbody today  Veliz skin type:ii   Findings:  Pink scaly macules on forehead and temples x 8  Pink and hypopigmented smooth patch with area of crust on left superior antihelix 1.0cm  Pink scaly macule on right preauricular 0.6cm  Rhytides, hypo/hyperpigmentation, and atrophy    Assessment and Plan:  1) Neoplasm of uncertain behavior on right preauricular 0.6cm  BCC vs SCC  TANGENTIAL BIOPSY:  After consent, anesthesia with LEC and prep, tangential biopsy performed.  No  complications and routine wound care.  May grow back and will get a scar. Based on lesion type may need to completely remove lesion. Patient will be notified in 7-10 days of results. Wound care directions given.    2) Neoplasm of uncertain behavior on left superior antihelix 1.0cm  BCC vs SCC  TANGENTIAL BIOPSY:  After consent, anesthesia with LEC and prep, tangential biopsy performed.  No complications and routine wound care.  May grow back and will get a scar. Based on lesion type may need to completely remove lesion. Patient will be notified in 7-10 days of results. Wound care directions given.    3) actinic keratoses and solar elastosis  LN2 for 5 seconds x 2. Discussed AE include hypopigmentation (white spot) and recurrence. Follow up in 2-3 months to recheck lesions. There is a 0.025%-20% chance that AKs can develop into a SCC.   Discussed treating with LN2 vs PDT vs Efudex. Pt elected LN2    4) history of BCC  Signs and Symptoms of non-melanoma skin cancer and ABCDEs of melanoma reviewed with patient. Patient encouraged to perform monthly self skin exams and educated on how to perform them. UV precautions reviewed with patient. Patient was asked about new or changing moles/lesions on body.   Wear a sunscreen with at least SPF 30 on your face, ears, neck and V of the chest daily. Wear sunscreen on other areas of the body if those areas are exposed to the sun throughout the day. Sunscreens can contain physical and/or chemical blockers. Physical blockers are less likely to clog pores, these include zinc oxide and titanium dioxide. Reapply every two hour and after swimming. Sunscreen examples include Neutrogena, CeraVe, Blue Lizard, Elta MD and many others.    Proper skin care from [facility]:    -Eliminate harsh soaps as they strip the natural oils from the skin, often resulting in dry itchy skin ( i.e. Dial, Zest, [place] Spring)  -Use mild soaps such as Cetaphil or Dove Sensitive Skin in the shower.  You do not need to use soap on arms, legs, and trunk every time you shower unless visibly soiled.   -Avoid hot or cold showers.  -After showering, lightly dry off and apply moisturizing within 2-3 minutes. This will help trap moisture in the skin.   -Aggressive use of a moisturizer at least 1-2 times a day to the entire body (including -Vanicream, Cetaphil, Aquaphor or Cerave) and moisturize hands after every washing.  -We recommend using moisturizers that come in a tub that needs to be scooped out, not a pump. This has more of an oil base. It will hold moisture in your skin much better than a water base moisturizer. The above recommended are non-pore clogging.         Follow up for FBE      Again, thank you for allowing me to participate in the care of your patient.        Sincerely,        Amanda Armstrong PA-C

## 2018-12-10 ENCOUNTER — TELEPHONE (OUTPATIENT)
Dept: DERMATOLOGY | Facility: CLINIC | Age: 73
End: 2018-12-10

## 2018-12-10 LAB — COPATH REPORT: NORMAL

## 2018-12-10 NOTE — TELEPHONE ENCOUNTER
Notes recorded by Amanda Armstrong PA-C on 12/10/2018 at 2:15 PM CST  A. Skin, right preauricular:   - Basal cell carcinoma, superficial and nodular types, extending to the   lateral and deep margins - (see   description)     ---mohs    B. Skin, left superior antihelix:   - Basal cell carcinoma, nodular type, extending to the deep margin - (see   description)     --mohs

## 2018-12-10 NOTE — LETTER
[facility]  [address]  [id number] [phone]    12/11/2018       John Riley  [address] [facility] [address]      Dear John:    You are scheduled for Mohs Surgery on: 2/7/19 @7:45am & 2/14/19 @7:45am.    Please check in at [facility], [address].     You don't need to arrive more than 5-10 minutes prior to your appointment time.     Be sure to eat a good breakfast and bathe and wash your hair prior to surgery.     If you are taking any anti-coagulants that are prescribed by your Doctor (such as Coumadin/Warfarin, Plavix, Aspirin, Ibuprofen), please continue taking them.     However, if you are taking anti-coagulants over the counter without a Doctor's order for a medical condition, please discontinue them 10 days prior to surgery.     Please wear loose comfortable clothing as it could possibly be 4-6 hours until your surgery is completed depending upon how many layers of tissue need to be removed.      Thank you,    MARCE Watson MD

## 2018-12-11 NOTE — TELEPHONE ENCOUNTER
Called and LM for patient to call back in regards to biopsy results oscar Gorman RN-BSN  [facility]  [phone]

## 2018-12-11 NOTE — TELEPHONE ENCOUNTER
Patient called back. Educated patient on biopsy results- BCC x2. Educated patient on BCC, Mohs, scheduled Mohs x2, and letter/packet sent. Patient voiced understanding.    Vita RN-BSN  [facility]  [phone]

## 2019-07-29 DIAGNOSIS — I63.9 CEREBROVASCULAR ACCIDENT (CVA), UNSPECIFIED MECHANISM (H): ICD-10-CM

## 2019-07-29 DIAGNOSIS — I10 ESSENTIAL HYPERTENSION: ICD-10-CM

## 2019-07-29 RX ORDER — ATORVASTATIN CALCIUM 20 MG/1
TABLET, FILM COATED ORAL
Qty: 90 TABLET | Refills: 0 | Status: SHIPPED | OUTPATIENT
Start: 2019-07-29 | End: 2019-08-12

## 2019-07-29 RX ORDER — LISINOPRIL 10 MG/1
TABLET ORAL
Qty: 90 TABLET | Refills: 0 | Status: SHIPPED | OUTPATIENT
Start: 2019-07-29 | End: 2019-08-12

## 2019-07-29 RX ORDER — CLOPIDOGREL BISULFATE 75 MG/1
TABLET ORAL
Qty: 90 TABLET | Refills: 0 | Status: SHIPPED | OUTPATIENT
Start: 2019-07-29 | End: 2019-08-12

## 2019-07-29 NOTE — TELEPHONE ENCOUNTER
"Requested Prescriptions   Pending Prescriptions Disp Refills     Same for all three medications    Last Written Prescription Date:  07/30/2018  Last Fill Quantity: 90 tablet,  # refills: 4   Last office visit: 7/30/2018 with prescribing provider:  07/30/2018   Future Office Visit:        clopidogrel (PLAVIX) 75 MG tablet [Pharmacy Med Name: Clopidogrel Bisulfate Oral Tablet 75 MG] 90 tablet 3     Sig: TAKE ONE TABLET BY MOUTH DAILY       Plavix Passed - 7/29/2019  8:50 AM        Passed - No active PPI on record unless is Protonix        Passed - Normal HGB on file in past 12 months     Recent Labs   Lab Test 07/30/18  1013   HGB 15.7               Passed - Normal Platelets on file in past 12 months     Recent Labs   Lab Test 07/30/18  1013                  Passed - Recent (12 mo) or future (30 days) visit within the authorizing provider's specialty     Patient had office visit in the last 12 months or has a visit in the next 30 days with authorizing provider or within the authorizing provider's specialty.  See \"Patient Info\" tab in inIumet, or \"Choose Columns\" in Meds & Orders section of the refill encounter.              Passed - Medication is active on med list        Passed - Patient is age 18 or older        lisinopril (PRINIVIL/ZESTRIL) 10 MG tablet [Pharmacy Med Name: Lisinopril Oral Tablet 10 MG] 90 tablet 3     Sig: TAKE ONE TABLET BY MOUTH DAILY       ACE Inhibitors (Including Combos) Protocol Passed - 7/29/2019  8:50 AM        Passed - Blood pressure under 140/90 in past 12 months     BP Readings from Last 3 Encounters:   12/05/18 133/82   07/30/18 120/72   05/09/17 110/70                 Passed - Recent (12 mo) or future (30 days) visit within the authorizing provider's specialty     Patient had office visit in the last 12 months or has a visit in the next 30 days with authorizing provider or within the authorizing provider's specialty.  See \"Patient Info\" tab in inbasket, or \"Choose Columns\" in " "Meds & Orders section of the refill encounter.              Passed - Medication is active on med list        Passed - Patient is age 18 or older        Passed - Normal serum creatinine on file in past 12 months     Recent Labs   Lab Test 07/30/18  1013   CR 0.98             Passed - Normal serum potassium on file in past 12 months     Recent Labs   Lab Test 07/30/18  1013   POTASSIUM 4.8             atorvastatin (LIPITOR) 20 MG tablet [Pharmacy Med Name: Atorvastatin Calcium Oral Tablet 20 MG] 90 tablet 3     Sig: TAKE ONE TABLET BY MOUTH DAILY       Statins Protocol Passed - 7/29/2019  8:50 AM        Passed - LDL on file in past 12 months     Recent Labs   Lab Test 07/30/18  1013   LDL 70             Passed - No abnormal creatine kinase in past 12 months     No lab results found.             Passed - Recent (12 mo) or future (30 days) visit within the authorizing provider's specialty     Patient had office visit in the last 12 months or has a visit in the next 30 days with authorizing provider or within the authorizing provider's specialty.  See \"Patient Info\" tab in inbasket, or \"Choose Columns\" in Meds & Orders section of the refill encounter.              Passed - Medication is active on med list        Passed - Patient is age 18 or older          Bob HARTMAN  "

## 2019-07-29 NOTE — TELEPHONE ENCOUNTER
Pt returned call, given message below. Appointment scheduled    Next 5 appointments (look out 90 days)    Aug 12, 2019  9:20 AM CDT  PHYSICAL with Cortez Kenny MD  [facility] [address] [facility] [address] [phone]            Melecio Graham   07/29/19 11:46 AM

## 2019-07-29 NOTE — TELEPHONE ENCOUNTER
LMTRC    Medication is being filled for 1 time refill only due to:  Patient needs to be seen because needs yearly OV and labs.  Freddy Serrano RN, BSN

## 2019-08-12 ENCOUNTER — OFFICE VISIT (OUTPATIENT)
Dept: FAMILY MEDICINE | Facility: CLINIC | Age: 74
End: 2019-08-12
Payer: COMMERCIAL

## 2019-08-12 VITALS
WEIGHT: 215 LBS | HEART RATE: 65 BPM | SYSTOLIC BLOOD PRESSURE: 128 MMHG | RESPIRATION RATE: 16 BRPM | HEIGHT: 71 IN | OXYGEN SATURATION: 98 % | DIASTOLIC BLOOD PRESSURE: 76 MMHG | TEMPERATURE: 99 F | BODY MASS INDEX: 30.1 KG/M2

## 2019-08-12 DIAGNOSIS — Z00.00 ENCOUNTER FOR MEDICARE ANNUAL WELLNESS EXAM: Primary | ICD-10-CM

## 2019-08-12 DIAGNOSIS — I63.9 CEREBROVASCULAR ACCIDENT (CVA), UNSPECIFIED MECHANISM (H): ICD-10-CM

## 2019-08-12 DIAGNOSIS — I10 ESSENTIAL HYPERTENSION: ICD-10-CM

## 2019-08-12 LAB
ALT SERPL W P-5'-P-CCNC: 33 U/L (ref 0–70)
ANION GAP SERPL CALCULATED.3IONS-SCNC: 7 MMOL/L (ref 3–14)
BUN SERPL-MCNC: 21 MG/DL (ref 7–30)
CALCIUM SERPL-MCNC: 8.8 MG/DL (ref 8.5–10.1)
CHLORIDE SERPL-SCNC: 106 MMOL/L (ref 94–109)
CO2 SERPL-SCNC: 26 MMOL/L (ref 20–32)
CREAT SERPL-MCNC: 1.07 MG/DL (ref 0.66–1.25)
ERYTHROCYTE [DISTWIDTH] IN BLOOD BY AUTOMATED COUNT: 12.2 % (ref 10–15)
GFR SERPL CREATININE-BSD FRML MDRD: 67 ML/MIN/{1.73_M2}
GLUCOSE SERPL-MCNC: 92 MG/DL (ref 70–99)
HCT VFR BLD AUTO: 47 % (ref 40–53)
HGB BLD-MCNC: 16.3 G/DL (ref 13.3–17.7)
MCH RBC QN AUTO: 34.3 PG (ref 26.5–33)
MCHC RBC AUTO-ENTMCNC: 34.7 G/DL (ref 31.5–36.5)
MCV RBC AUTO: 99 FL (ref 78–100)
PLATELET # BLD AUTO: 212 10E9/L (ref 150–450)
POTASSIUM SERPL-SCNC: 4.7 MMOL/L (ref 3.4–5.3)
RBC # BLD AUTO: 4.75 10E12/L (ref 4.4–5.9)
SODIUM SERPL-SCNC: 139 MMOL/L (ref 133–144)
WBC # BLD AUTO: 5.1 10E9/L (ref 4–11)

## 2019-08-12 PROCEDURE — 36415 COLL VENOUS BLD VENIPUNCTURE: CPT | Performed by: FAMILY MEDICINE

## 2019-08-12 PROCEDURE — 84460 ALANINE AMINO (ALT) (SGPT): CPT | Performed by: FAMILY MEDICINE

## 2019-08-12 PROCEDURE — 85027 COMPLETE CBC AUTOMATED: CPT | Performed by: FAMILY MEDICINE

## 2019-08-12 PROCEDURE — 99397 PER PM REEVAL EST PAT 65+ YR: CPT | Performed by: FAMILY MEDICINE

## 2019-08-12 PROCEDURE — 80048 BASIC METABOLIC PNL TOTAL CA: CPT | Performed by: FAMILY MEDICINE

## 2019-08-12 RX ORDER — LISINOPRIL 10 MG/1
10 TABLET ORAL DAILY
Qty: 90 TABLET | Refills: 4 | Status: SHIPPED | OUTPATIENT
Start: 2019-08-12 | End: 2020-10-20

## 2019-08-12 RX ORDER — ATORVASTATIN CALCIUM 20 MG/1
20 TABLET, FILM COATED ORAL DAILY
Qty: 90 TABLET | Refills: 4 | Status: SHIPPED | OUTPATIENT
Start: 2019-08-12 | End: 2020-10-20

## 2019-08-12 RX ORDER — CLOPIDOGREL BISULFATE 75 MG/1
75 TABLET ORAL DAILY
Qty: 90 TABLET | Refills: 4 | Status: SHIPPED | OUTPATIENT
Start: 2019-08-12 | End: 2020-10-20

## 2019-08-12 ASSESSMENT — MIFFLIN-ST. JEOR: SCORE: 1727.36

## 2019-08-12 NOTE — PATIENT INSTRUCTIONS
Patient Education   Personalized Prevention Plan  You are due for the preventive services outlined below.  Your care team is available to assist you in scheduling these services.  If you have already completed any of these items, please share that information with your care team to update in your medical record.  Health Maintenance Due   Topic Date Due     Zoster (Shingles) Vaccine (1 of 2) 04/04/1993     Annual Wellness Visit  04/04/2008     FALL RISK ASSESSMENT  12/19/2017     PHQ-2  01/01/2019

## 2019-08-12 NOTE — PROGRESS NOTES
"  SUBJECTIVE:   John Riley is a 74 year old male who presents for Preventive Visit.  Are you in the first 12 months of your Medicare Part B coverage?  No    Physical Health:    In general, how would you rate your overall physical health? good    Outside of work, how many days during the week do you exercise? 6-7 days/week    Outside of work, approximately how many minutes a day do you exercise?greater than 60 minutes    If you drink alcohol do you typically have >3 drinks per day or >7 drinks per week? No    Do you usually eat at least 4 servings of fruit and vegetables a day, include whole grains & fiber and avoid regularly eating high fat or \"junk\" foods? Yes    Do you have any problems taking medications regularly?  No    Do you have any side effects from medications? none    Needs assistance for the following daily activities: no assistance needed    Which of the following safety concerns are present in your home?  none identified     Hearing impairment: No, but has no issues at this time    In the past 6 months, have you been bothered by leaking of urine? no    Mental Health:    In general, how would you rate your overall mental or emotional health? good  PHQ-2 Score: 0    Do you feel safe in your environment? Yes    Do you have a Health Care Directive? Yes: Advance Directive has been received and scanned.    Additional concerns to address?  No    Fall risk:  Fallen 2 or more times in the past year?: No  Any fall with injury in the past year?: No    Cognitive Screenin) Repeat 3 items (Leader, Season, Table)    2) Clock draw: NORMAL  3) 3 item recall: Recalls 1 object   Results: NORMAL clock, 1-2 items recalled: COGNITIVE IMPAIRMENT LESS LIKELY    Mini-CogTM Copyright ASTER Soliman. Licensed by the author for use in [facility]; reprinted with permission (edson@.[email or web address]). All rights reserved.      Do you have sleep apnea, excessive snoring or daytime drowsiness?: snore    History of " hypertension. Well controlled with lisinopril. Denies chest pain, heart palpitations, peripheral edema, shortness of breath, lightheadedness, or vision changes.     Patient with history of stroke, occurring on 12/10/16. Patient had sudden left upper extremity weakness and left facial droop with dysarthria and expressive aphasia.  Was on aspirin prior to stroke so transition to Plavix. Taking statin as well. Other than occasional left arm symptoms, patient has had resolution of neurologic symptoms. Doing well on Plavix without bleeding. Echocardiogram normal other than PFO noted. Otherwise had normal CT angiogram of head and neck.    Reviewed and updated as needed this visit by clinical staff  Tobacco  Allergies  Meds  Med Hx  Surg Hx  Fam Hx  Soc Hx      Reviewed and updated as needed this visit by Provider        Social History     Tobacco Use     Smoking status: Never Smoker     Smokeless tobacco: Never Used   Substance Use Topics     Alcohol use: Yes     Current providers sharing in care for this patient include:   Patient Care Team:  Cortez Kenny MD as PCP - General (Family Practice)  Cortez Kenny MD as Assigned PCP    The following health maintenance items are reviewed in Epic and correct as of today:  Health Maintenance   Topic Date Due     ZOSTER IMMUNIZATION (1 of 2) 04/04/1993     MEDICARE ANNUAL WELLNESS VISIT  04/04/2008     FALL RISK ASSESSMENT  12/19/2017     PHQ-2  01/01/2019     INFLUENZA VACCINE (1) 09/01/2019     LIPID  12/19/2021     ADVANCE CARE PLANNING  12/19/2021     DTAP/TDAP/TD IMMUNIZATION (3 - Td) 04/15/2025     IPV IMMUNIZATION  Aged Out     MENINGITIS IMMUNIZATION  Aged Out     Patient Active Problem List   Diagnosis     CVA (cerebral vascular accident) (H)     Advanced directives, counseling/discussion     Cryptogenic stroke (H)     Benign essential hypertension     History reviewed. No pertinent surgical history.    Social History     Tobacco Use     Smoking status:  "Never Smoker     Smokeless tobacco: Never Used   Substance Use Topics     Alcohol use: Yes     Family History   Problem Relation Age of Onset     Heart Disease Mother          ROS:  CONSTITUTIONAL: NEGATIVE for fever, chills, change in weight  INTEGUMENTARY/SKIN: NEGATIVE for worrisome rashes, moles or lesions  EYES: NEGATIVE for vision changes or irritation  ENT/MOUTH: NEGATIVE for ear, mouth and throat problems  RESP: NEGATIVE for significant cough or SOB  CV: NEGATIVE for chest pain, palpitations or peripheral edema  GI: NEGATIVE for nausea, abdominal pain, heartburn, or change in bowel habits  : NEGATIVE for frequency, dysuria, or hematuria  MUSCULOSKELETAL: NEGATIVE for significant arthralgias or myalgia  NEURO: NEGATIVE for weakness, dizziness or paresthesias  HEME: NEGATIVE for bleeding problems  PSYCHIATRIC: NEGATIVE for changes in mood or affect    This document serves as a record of the services and decisions personally performed and made by Cortez Kenny MD. It was created on his behalf by Bredna Lemon, a trained medical scribe. The creation of this document is based on the provider's statements to the medical scribe.  Brenda Lemon 9:26 AM August 12, 2019    OBJECTIVE:   /76 (BP Location: Right arm, Patient Position: Chair, Cuff Size: Adult Regular)   Pulse 65   Temp 99  F (37.2  C) (Oral)   Resp 16   Ht 1.803 m (5' 11\")   Wt 97.5 kg (215 lb)   SpO2 98%   BMI 29.99 kg/m   Estimated body mass index is 29.99 kg/m  as calculated from the following:    Height as of this encounter: 1.803 m (5' 11\").    Weight as of this encounter: 97.5 kg (215 lb).  EXAM:   GENERAL: healthy, alert and no distress  EYES: Eyes grossly normal to inspection, PERRL and conjunctivae and sclerae normal  HENT: ear canals and TM's normal, nose and mouth without ulcers or lesions  NECK: no adenopathy, no asymmetry, masses, or scars and thyroid normal to palpation  RESP: lungs clear to auscultation - no rales, rhonchi " "or wheezes  CV: regular rate and rhythm, normal S1 S2, no S3 or S4, no murmur, click or rub, no peripheral edema and peripheral pulses strong  ABDOMEN: soft, nontender, no hepatosplenomegaly, no masses and bowel sounds normal   (male): normal male genitalia without lesions or urethral discharge, no hernia  MS: no gross musculoskeletal defects noted, no edema  SKIN: no suspicious lesions or rashes  NEURO: Normal strength and tone, mentation intact and speech normal  PSYCH: mentation appears normal, affect normal/bright    Diagnostic Test Results:  Labs reviewed in Epic  No results found for this or any previous visit (from the past 24 hour(s)).    ASSESSMENT / PLAN:   1. Encounter for Medicare annual wellness exam    2. Cerebrovascular accident (CVA), unspecified mechanism (H)  - atorvastatin (LIPITOR) 20 MG tablet; Take 1 tablet (20 mg) by mouth daily  Dispense: 90 tablet; Refill: 4  - clopidogrel (PLAVIX) 75 MG tablet; Take 1 tablet (75 mg) by mouth daily  Dispense: 90 tablet; Refill: 4    3. Essential hypertension  - lisinopril (PRINIVIL/ZESTRIL) 10 MG tablet; Take 1 tablet (10 mg) by mouth daily  Dispense: 90 tablet; Refill: 4  - Basic metabolic panel  - ALT    End of Life Planning:  Patient currently has an advanced directive: Yes. Patient will bring a copy to the clinic.     COUNSELING:  Reviewed preventive health counseling, as reflected in patient instructions  Special attention given to:       Regular exercise       Healthy diet/nutrition    Estimated body mass index is 29.99 kg/m  as calculated from the following:    Height as of this encounter: 1.803 m (5' 11\").    Weight as of this encounter: 97.5 kg (215 lb).         reports that he has never smoked. He has never used smokeless tobacco.      Appropriate preventive services were discussed with this patient, including applicable screening as appropriate for cardiovascular disease, diabetes, osteopenia/osteoporosis, and glaucoma.  As appropriate for " age/gender, discussed screening for colorectal cancer, prostate cancer, breast cancer, and cervical cancer. Checklist reviewing preventive services available has been given to the patient.    Reviewed patients plan of care and provided an AVS. The Basic Care Plan (routine screening as documented in Health Maintenance) for John meets the Care Plan requirement. This Care Plan has been established and reviewed with the Patient.    Counseling Resources:  ATP IV Guidelines  Pooled Cohorts Equation Calculator  Breast Cancer Risk Calculator  FRAX Risk Assessment  ICSI Preventive Guidelines  Dietary Guidelines for Americans, 2010  USDA's MyPlate  ASA Prophylaxis  Lung CA Screening    The information in this document, created by the medical scribe for me, accurately reflects the services I personally performed and the decisions made by me. I have reviewed and approved this document for accuracy prior to leaving the patient care area.  August 12, 2019 9:44 AM    Cortez Kenny MD  [employer]

## 2020-03-22 ENCOUNTER — HEALTH MAINTENANCE LETTER (OUTPATIENT)
Age: 75
End: 2020-03-22

## 2020-10-18 DIAGNOSIS — I63.9 CEREBROVASCULAR ACCIDENT (CVA), UNSPECIFIED MECHANISM (H): ICD-10-CM

## 2020-10-18 DIAGNOSIS — I10 ESSENTIAL HYPERTENSION: ICD-10-CM

## 2020-10-19 NOTE — TELEPHONE ENCOUNTER
Routing refill request to provider for review/approval because:  Labs not current:  All labs are not current  Patient needs to be seen because it has been more than 1 year since last office visit.    Freddy Serrano RN, BSN

## 2020-10-20 RX ORDER — ATORVASTATIN CALCIUM 20 MG/1
TABLET, FILM COATED ORAL
Qty: 90 TABLET | Refills: 0 | Status: SHIPPED | OUTPATIENT
Start: 2020-10-20 | End: 2021-01-15

## 2020-10-20 RX ORDER — LISINOPRIL 10 MG/1
TABLET ORAL
Qty: 90 TABLET | Refills: 0 | Status: SHIPPED | OUTPATIENT
Start: 2020-10-20 | End: 2021-01-15

## 2020-10-20 RX ORDER — CLOPIDOGREL BISULFATE 75 MG/1
TABLET ORAL
Qty: 90 TABLET | Refills: 0 | Status: SHIPPED | OUTPATIENT
Start: 2020-10-20 | End: 2021-01-15

## 2021-01-15 ENCOUNTER — HEALTH MAINTENANCE LETTER (OUTPATIENT)
Age: 76
End: 2021-01-15

## 2021-01-15 ENCOUNTER — OFFICE VISIT (OUTPATIENT)
Dept: FAMILY MEDICINE | Facility: CLINIC | Age: 76
End: 2021-01-15
Payer: COMMERCIAL

## 2021-01-15 VITALS
OXYGEN SATURATION: 98 % | HEART RATE: 68 BPM | BODY MASS INDEX: 31.12 KG/M2 | SYSTOLIC BLOOD PRESSURE: 138 MMHG | HEIGHT: 70 IN | DIASTOLIC BLOOD PRESSURE: 83 MMHG | TEMPERATURE: 97.9 F | WEIGHT: 217.4 LBS

## 2021-01-15 DIAGNOSIS — I10 ESSENTIAL HYPERTENSION: ICD-10-CM

## 2021-01-15 DIAGNOSIS — I63.9 CEREBROVASCULAR ACCIDENT (CVA), UNSPECIFIED MECHANISM (H): ICD-10-CM

## 2021-01-15 DIAGNOSIS — Z11.59 NEED FOR HEPATITIS C SCREENING TEST: ICD-10-CM

## 2021-01-15 DIAGNOSIS — Z00.00 ENCOUNTER FOR MEDICARE ANNUAL WELLNESS EXAM: Primary | ICD-10-CM

## 2021-01-15 DIAGNOSIS — I63.9 CRYPTOGENIC STROKE (H): ICD-10-CM

## 2021-01-15 LAB
ERYTHROCYTE [DISTWIDTH] IN BLOOD BY AUTOMATED COUNT: 12.2 % (ref 10–15)
HCT VFR BLD AUTO: 46.1 % (ref 40–53)
HCV AB SERPL QL IA: NONREACTIVE
HGB BLD-MCNC: 15.7 G/DL (ref 13.3–17.7)
MCH RBC QN AUTO: 34.1 PG (ref 26.5–33)
MCHC RBC AUTO-ENTMCNC: 34.1 G/DL (ref 31.5–36.5)
MCV RBC AUTO: 100 FL (ref 78–100)
PLATELET # BLD AUTO: 212 10E9/L (ref 150–450)
RBC # BLD AUTO: 4.6 10E12/L (ref 4.4–5.9)
WBC # BLD AUTO: 5.8 10E9/L (ref 4–11)

## 2021-01-15 PROCEDURE — 99397 PER PM REEVAL EST PAT 65+ YR: CPT | Performed by: FAMILY MEDICINE

## 2021-01-15 PROCEDURE — 80048 BASIC METABOLIC PNL TOTAL CA: CPT | Performed by: FAMILY MEDICINE

## 2021-01-15 PROCEDURE — 86803 HEPATITIS C AB TEST: CPT | Performed by: FAMILY MEDICINE

## 2021-01-15 PROCEDURE — 80061 LIPID PANEL: CPT | Performed by: FAMILY MEDICINE

## 2021-01-15 PROCEDURE — 99213 OFFICE O/P EST LOW 20 MIN: CPT | Mod: 25 | Performed by: FAMILY MEDICINE

## 2021-01-15 PROCEDURE — 36415 COLL VENOUS BLD VENIPUNCTURE: CPT | Performed by: FAMILY MEDICINE

## 2021-01-15 PROCEDURE — 85027 COMPLETE CBC AUTOMATED: CPT | Performed by: FAMILY MEDICINE

## 2021-01-15 RX ORDER — CLOPIDOGREL BISULFATE 75 MG/1
75 TABLET ORAL DAILY
Qty: 90 TABLET | Refills: 3 | Status: SHIPPED | OUTPATIENT
Start: 2021-01-15 | End: 2022-01-14

## 2021-01-15 RX ORDER — LISINOPRIL 10 MG/1
10 TABLET ORAL DAILY
Qty: 90 TABLET | Refills: 3 | Status: SHIPPED | OUTPATIENT
Start: 2021-01-15 | End: 2022-01-14

## 2021-01-15 RX ORDER — ATORVASTATIN CALCIUM 20 MG/1
20 TABLET, FILM COATED ORAL DAILY
Qty: 90 TABLET | Refills: 3 | Status: SHIPPED | OUTPATIENT
Start: 2021-01-15 | End: 2022-01-14

## 2021-01-15 ASSESSMENT — MIFFLIN-ST. JEOR: SCORE: 1717.37

## 2021-01-15 NOTE — PROGRESS NOTES
SUBJECTIVE:   John Riley is a 75 year old male who presents for Preventive Visit.    Patient has been advised of split billing requirements and indicates understanding: Yes   Are you in the first 12 months of your Medicare coverage?  No    History of Present Illness       He eats 0-1 servings of fruits and vegetables daily.He consumes 0 sweetened beverage(s) daily.He exercises with enough effort to increase his heart rate 9 or less minutes per day.  He exercises with enough effort to increase his heart rate 3 or less days per week.   He is taking medications regularly.    Do you feel safe in your environment? Yes    Have you ever done Advance Care Planning? (For example, a Health Directive, POLST, or a discussion with a medical provider or your loved ones about your wishes): Yes, advance care planning is on file.    Fall risk  Fallen 2 or more times in the past year?: No  Any fall with injury in the past year?: No    Cognitive Screening   1) Repeat 3 items (Leader, Season, Table)    2) Clock draw: NORMAL  3) 3 item recall: Recalls 2 objects   Results: NORMAL clock, 1-2 items recalled: COGNITIVE IMPAIRMENT LESS LIKELY    Mini-CogTM Copyright S [employer]. Licensed by the author for use in [facility]; reprinted with permission (edson@[facility]). All rights reserved.      Do you have sleep apnea, excessive snoring or daytime drowsiness?: no    Reviewed and updated as needed this visit by clinical staff  Tobacco  Allergies    Med Hx  Surg Hx  Fam Hx  Soc Hx        Reviewed and updated as needed this visit by Provider  Tobacco               Social History     Tobacco Use     Smoking status: Never Smoker     Smokeless tobacco: Never Used   Substance Use Topics     Alcohol use: Yes     No flowsheet data found.    Patient with history of stroke, occurring on 12/10/16.  Patient had sudden left upper extremity weakness and left facial droop with dysarthria and expressive aphasia.  Was on aspirin prior to  "stroke so transition to Plavix. Taking statin as well. Patient has had resolution of neurologic symptoms. Doing well on Plavix without bleeding. Echocardiogram normal other than PFO noted.  Otherwise had normal CT angiogram of head and neck.     MRI brain 12/10/16  IMPRESSION:    1. Recent posterior right frontal infarct.  2. Diffuse cerebral volume loss and cerebral white matter changes  consistent with chronic small vessel ischemic disease.    Patient with history of hypertension. Well controlled with lisinopril.     Current providers sharing in care for this patient include:   Patient Care Team:  Cortez Kenny MD as PCP - General (Family Practice)  Cortez Kenny MD as Assigned PCP    The following health maintenance items are reviewed in Epic and correct as of today:  Health Maintenance   Topic Date Due     HEPATITIS C SCREENING  [birth year]04/1961     ZOSTER IMMUNIZATION (1 of 2) 04/04/1993     BMP  08/12/2020     FALL RISK ASSESSMENT  08/12/2020     LIPID  12/19/2021     MEDICARE ANNUAL WELLNESS VISIT  01/15/2022     ANNUAL REVIEW OF HM ORDERS  01/15/2022     DTAP/TDAP/TD IMMUNIZATION (3 - Td) 04/15/2025     ADVANCE CARE PLANNING  01/15/2026     PHQ-2  Completed     INFLUENZA VACCINE  Completed     Pneumococcal Vaccine: 65+ Years  Completed     Pneumococcal Vaccine: Pediatrics (0 to 5 Years) and At-Risk Patients (6 to 64 Years)  Aged Out     IPV IMMUNIZATION  Aged Out     MENINGITIS IMMUNIZATION  Aged Out     HEPATITIS B IMMUNIZATION  Aged Out     Review of Systems  Constitutional, HEENT, cardiovascular, pulmonary, gi and gu systems are negative, except as otherwise noted.    OBJECTIVE:   /83 (BP Location: Right arm, Patient Position: Chair, Cuff Size: Adult Large)   Pulse 68   Temp 97.9  F (36.6  C) (Oral)   Ht 1.778 m (5' 10\")   Wt 98.6 kg (217 lb 6.4 oz)   SpO2 98%   BMI 31.19 kg/m   Estimated body mass index is 31.19 kg/m  as calculated from the following:    Height as of this encounter: 1.778 " "m (5' 10\").    Weight as of this encounter: 98.6 kg (217 lb 6.4 oz).  Physical Exam  GENERAL: healthy, alert and no distress  EYES: Eyes grossly normal to inspection, PERRL and conjunctivae and sclerae normal  HENT: ear canals and TM's normal, nose and mouth without ulcers or lesions  NECK: no adenopathy, no asymmetry, masses, or scars and thyroid normal to palpation  RESP: lungs clear to auscultation - no rales, rhonchi or wheezes  CV: regular rate and rhythm, normal S1 S2, no S3 or S4, no murmur, click or rub, no peripheral edema and peripheral pulses strong  ABDOMEN: soft, nontender, without hepatosplenomegaly or masses  MS: no gross musculoskeletal defects noted, no edema  SKIN: no suspicious lesions or rashes  NEURO: Normal strength and tone, mentation intact and speech normal  PSYCH: mentation appears normal, affect normal/bright    Diagnostic Test Results:  Labs reviewed in Epic    ASSESSMENT / PLAN:   1. Encounter for Medicare annual wellness exam    2. Need for hepatitis C screening test  - Hepatitis C Screen Reflex to HCV RNA Quant and Genotype    3. Cerebrovascular accident (CVA), unspecified mechanism (H)  - Lipid panel reflex to direct LDL Fasting  - atorvastatin (LIPITOR) 20 MG tablet; Take 1 tablet (20 mg) by mouth daily  Dispense: 90 tablet; Refill: 3  - clopidogrel (PLAVIX) 75 MG tablet; Take 1 tablet (75 mg) by mouth daily  Dispense: 90 tablet; Refill: 3    4. Cryptogenic stroke (H)  - CBC with platelets    5. Benign essential hypertension  - Basic metabolic panel  (Ca, Cl, CO2, Creat, Gluc, K, Na, BUN)    6. Essential hypertension  - lisinopril (ZESTRIL) 10 MG tablet; Take 1 tablet (10 mg) by mouth daily  Dispense: 90 tablet; Refill: 3    Patient has been advised of split billing requirements and indicates understanding: Yes  COUNSELING:  Reviewed preventive health counseling, as reflected in patient instructions    Estimated body mass index is 31.19 kg/m  as calculated from the following:    " "Height as of this encounter: 1.778 m (5' 10\").    Weight as of this encounter: 98.6 kg (217 lb 6.4 oz).    Weight management plan: Discussed healthy diet and exercise guidelines    He reports that he has never smoked. He has never used smokeless tobacco.      Appropriate preventive services were discussed with this patient, including applicable screening as appropriate for cardiovascular disease, diabetes, osteopenia/osteoporosis, and glaucoma.  As appropriate for age/gender, discussed screening for colorectal cancer, prostate cancer, breast cancer, and cervical cancer. Checklist reviewing preventive services available has been given to the patient.    Reviewed patients plan of care and provided an AVS. The Basic Care Plan (routine screening as documented in Health Maintenance) for John meets the Care Plan requirement. This Care Plan has been established and reviewed with the Patient.    Counseling Resources:  ATP IV Guidelines  Pooled Cohorts Equation Calculator  Breast Cancer Risk Calculator  Breast Cancer: Medication to Reduce Risk  FRAX Risk Assessment  ICSI Preventive Guidelines  Dietary Guidelines for Americans, 2010  USDA's MyPlate  ASA Prophylaxis  Lung CA Screening    Cortez Kenny MD  [facility]  "

## 2021-01-16 LAB
ANION GAP SERPL CALCULATED.3IONS-SCNC: 3 MMOL/L (ref 3–14)
BUN SERPL-MCNC: 14 MG/DL (ref 7–30)
CALCIUM SERPL-MCNC: 8.6 MG/DL (ref 8.5–10.1)
CHLORIDE SERPL-SCNC: 109 MMOL/L (ref 94–109)
CHOLEST SERPL-MCNC: 134 MG/DL
CO2 SERPL-SCNC: 28 MMOL/L (ref 20–32)
CREAT SERPL-MCNC: 1.02 MG/DL (ref 0.66–1.25)
GFR SERPL CREATININE-BSD FRML MDRD: 70 ML/MIN/{1.73_M2}
GLUCOSE SERPL-MCNC: 83 MG/DL (ref 70–99)
HDLC SERPL-MCNC: 69 MG/DL
LDLC SERPL CALC-MCNC: 53 MG/DL
NONHDLC SERPL-MCNC: 65 MG/DL
POTASSIUM SERPL-SCNC: 4.6 MMOL/L (ref 3.4–5.3)
SODIUM SERPL-SCNC: 140 MMOL/L (ref 133–144)
TRIGL SERPL-MCNC: 59 MG/DL

## 2021-01-18 PROBLEM — I10 ESSENTIAL HYPERTENSION: Status: ACTIVE | Noted: 2017-06-20

## 2021-01-29 ENCOUNTER — IMMUNIZATION (OUTPATIENT)
Dept: NURSING | Facility: CLINIC | Age: 76
End: 2021-01-29
Payer: COMMERCIAL

## 2021-01-29 PROCEDURE — 91300 PR COVID VAC PFIZER DIL RECON 30 MCG/0.3 ML IM: CPT

## 2021-01-29 PROCEDURE — 0001A PR COVID VAC PFIZER DIL RECON 30 MCG/0.3 ML IM: CPT

## 2021-02-19 ENCOUNTER — IMMUNIZATION (OUTPATIENT)
Dept: NURSING | Facility: CLINIC | Age: 76
End: 2021-02-19
Attending: PEDIATRICS
Payer: COMMERCIAL

## 2021-02-19 PROCEDURE — 0002A PR COVID VAC PFIZER DIL RECON 30 MCG/0.3 ML IM: CPT

## 2021-02-19 PROCEDURE — 91300 PR COVID VAC PFIZER DIL RECON 30 MCG/0.3 ML IM: CPT

## 2021-09-15 ENCOUNTER — OFFICE VISIT (OUTPATIENT)
Dept: FAMILY MEDICINE | Facility: CLINIC | Age: 76
End: 2021-09-15
Payer: COMMERCIAL

## 2021-09-15 ENCOUNTER — ANCILLARY PROCEDURE (OUTPATIENT)
Dept: GENERAL RADIOLOGY | Facility: CLINIC | Age: 76
End: 2021-09-15
Attending: FAMILY MEDICINE
Payer: COMMERCIAL

## 2021-09-15 VITALS
SYSTOLIC BLOOD PRESSURE: 128 MMHG | RESPIRATION RATE: 16 BRPM | DIASTOLIC BLOOD PRESSURE: 82 MMHG | OXYGEN SATURATION: 98 % | TEMPERATURE: 98 F | HEART RATE: 78 BPM | BODY MASS INDEX: 30.06 KG/M2 | WEIGHT: 210 LBS | HEIGHT: 70 IN

## 2021-09-15 DIAGNOSIS — M25.562 PAIN IN BOTH KNEES, UNSPECIFIED CHRONICITY: ICD-10-CM

## 2021-09-15 DIAGNOSIS — M22.2X1 PATELLOFEMORAL PAIN SYNDROME OF BOTH KNEES: Primary | ICD-10-CM

## 2021-09-15 DIAGNOSIS — M22.2X2 PATELLOFEMORAL PAIN SYNDROME OF BOTH KNEES: Primary | ICD-10-CM

## 2021-09-15 DIAGNOSIS — M25.561 PAIN IN BOTH KNEES, UNSPECIFIED CHRONICITY: ICD-10-CM

## 2021-09-15 PROCEDURE — 73562 X-RAY EXAM OF KNEE 3: CPT | Mod: LT | Performed by: RADIOLOGY

## 2021-09-15 PROCEDURE — 99213 OFFICE O/P EST LOW 20 MIN: CPT | Performed by: FAMILY MEDICINE

## 2021-09-15 ASSESSMENT — MIFFLIN-ST. JEOR: SCORE: 1678.8

## 2021-09-15 NOTE — PROGRESS NOTES
"    Assessment & Plan     Patellofemoral pain syndrome of both knees  - [facility] Ref[facility]; Future  - Recommend physical therapy. If no improvement in 3 months, knee injection may be considered.     Pain in both knees, unspecified chronicity  - XR Knee Bilateral 3 Views; Future  - [facility]; Future  - Recommend physical therapy. If no improvement in 3 months, knee injection may be considered.              BMI:   Estimated body mass index is 30.13 kg/m  as calculated from the following:    Height as of this encounter: 1.778 m (5' 10\").    Weight as of this encounter: 95.3 kg (210 lb).       Follow up if pain does not improve after physical therapy    No follow-ups on file.    Cortez Kenny MD  [facility] AARON Nascimento is a 76 year old male with a history of CVA and hypertension presents to the clinic today for bilateral knee pain. The pain has been occurring for \"years\" and has become progressively worse. The pain feels sharp and occurs after rest. The pain becomes better after moving around. The patient has tried topical pain ointment with no relief. The patient denies numbness, tingling, trauma and swelling.     History of Present Illness       He eats 0-1 servings of fruits and vegetables daily.He consumes 0 sweetened beverage(s) daily.He exercises with enough effort to increase his heart rate 20 to 29 minutes per day.  He exercises with enough effort to increase his heart rate 7 days per week.   He is taking medications regularly.         Review of Systems   EYES: NEGATIVE for vision changes or irritation  : negative for dysuria, hematuria, decreased urinary stream  MUSCULOSKELETAL: POSITIVE for bilateral knee stiffness and discomfort       Objective    /82 (BP Location: Right arm, Patient Position: Sitting, Cuff Size: Adult Large)   Pulse 78   Temp 98  F (36.7  C) (Oral)   Resp 16   Ht 1.778 m (5' 10\")   Wt 95.3 kg (210 lb)   SpO2 98%   BMI " 30.13 kg/m    Body mass index is 30.13 kg/m .  Physical Exam   GENERAL: healthy, alert and no distress  EYES: Eyes grossly normal to inspection, PERRL and conjunctivae and sclerae normal  HENT: ear canals and TM's normal, nose and mouth without ulcers or lesions. Small amount of cerumen in left ear canal.   NECK: no adenopathy, no asymmetry, masses, or scars and thyroid normal to palpation  RESP: lungs clear to auscultation - no rales, rhonchi or wheezes  CV: regular rate and rhythm, normal S1 S2, no S3 or S4, no murmur, click or rub, no peripheral edema and peripheral pulses strong  ABDOMEN: soft, nontender, no hepatosplenomegaly, no masses and bowel sounds normal  MS: Negative balloon and bulge, Johnathon test, lochman's test, and drawer test bilateral knees. No swelling, redness, and range of motion intact in bilateral knees. Left knee with mild pain with patellar glide test.   NEURO: Normal strength and tone, mentation intact and speech normal  PSYCH: mentation appears normal, affect normal/bright    X-ray bilateral knees ordered and interpreted in the office today. X-ray shows: Osteophyte superior patella, very minimal joint space narrowing medial right knee, otherwise normal knees.  Radiology read pending.    Physician Attestation   I, Cortez Kenny, was present with the medical/SG student who participated in the service and in the documentation of the note.  I have verified the history and personally performed the physical exam and medical decision making.  I agree with the assessment and plan of care as documented in the note.      Items personally reviewed: vitals and imaging and agree with the interpretation documented in the note.    Cortez Kenny MD

## 2021-09-22 ENCOUNTER — THERAPY VISIT (OUTPATIENT)
Dept: PHYSICAL THERAPY | Facility: CLINIC | Age: 76
End: 2021-09-22
Attending: FAMILY MEDICINE
Payer: COMMERCIAL

## 2021-09-22 DIAGNOSIS — M25.561 PAIN IN BOTH KNEES, UNSPECIFIED CHRONICITY: ICD-10-CM

## 2021-09-22 DIAGNOSIS — M25.562 PAIN IN BOTH KNEES, UNSPECIFIED CHRONICITY: ICD-10-CM

## 2021-09-22 DIAGNOSIS — M22.2X2 PATELLOFEMORAL PAIN SYNDROME OF BOTH KNEES: ICD-10-CM

## 2021-09-22 DIAGNOSIS — M22.2X1 PATELLOFEMORAL PAIN SYNDROME OF BOTH KNEES: ICD-10-CM

## 2021-09-22 PROCEDURE — 97161 PT EVAL LOW COMPLEX 20 MIN: CPT | Mod: GP | Performed by: PHYSICAL THERAPIST

## 2021-09-22 PROCEDURE — 97110 THERAPEUTIC EXERCISES: CPT | Mod: GP | Performed by: PHYSICAL THERAPIST

## 2021-09-22 ASSESSMENT — ACTIVITIES OF DAILY LIVING (ADL)
LIMPING: THE SYMPTOM AFFECTS MY ACTIVITY SLIGHTLY
SIT WITH YOUR KNEE BENT: NOT ANSWERED
PAIN: THE SYMPTOM AFFECTS MY ACTIVITY MODERATELY
AS_A_RESULT_OF_YOUR_KNEE_INJURY,_HOW_WOULD_YOU_RATE_YOUR_CURRENT_LEVEL_OF_DAILY_ACTIVITY?: NOT ANSWERED
KNEEL ON THE FRONT OF YOUR KNEE: NOT ANSWERED
WALK: NOT ANSWERED
STAND: NOT ANSWERED
HOW_WOULD_YOU_RATE_THE_OVERALL_FUNCTION_OF_YOUR_KNEE_DURING_YOUR_USUAL_DAILY_ACTIVITIES?: NOT ANSWERED
GO UP STAIRS: NOT ANSWERED
GIVING WAY, BUCKLING OR SHIFTING OF KNEE: I HAVE THE SYMPTOM BUT IT DOES NOT AFFECT MY ACTIVITY
KNEE_ACTIVITY_OF_DAILY_LIVING_SUM: 20
WEAKNESS: I DO NOT HAVE THE SYMPTOM
STIFFNESS: THE SYMPTOM AFFECTS MY ACTIVITY MODERATELY
RISE FROM A CHAIR: NOT ANSWERED
GO DOWN STAIRS: NOT ANSWERED
SWELLING: I DO NOT HAVE THE SYMPTOM
SQUAT: NOT ANSWERED

## 2021-09-22 NOTE — PROGRESS NOTES
Physical Therapy Initial Evaluation  Subjective:  History of bilateral knee pain for years secondary to OA. Pt referred by MD for physical therapy on 9-15-21.    The history is provided by the patient. No  was used.   Therapist Generated HPI Evaluation         Type of problem:  Bilateral knees.    This is a chronic condition.  Condition occurred with:  Degenerative joint disease.  Where condition occurred: for unknown reasons.  Patient reports pain:  Anterior.  Pain is described as sharp and is intermittent.  Pain is worse during the day.  Since onset symptoms are gradually worsening.  Associated symptoms:  Loss of motion/stiffness, loss of strength and edema. Symptoms are exacerbated by bending/squatting, descending stairs, ascending stairs, transfers and kneeling  and relieved by rest.  Special tests included:  X-ray (see report).  Past treatment: none.   Work activity restrictions: retired.  Barriers include:  None as reported by patient.    Patient Health History             Pertinent medical history includes: stroke.     Medical allergies: none.       Current medications:  High blood pressure medication (blood thinner and chlorestrol).    Current occupation is retired.                                       Objective:  Standing Alignment:                Ankle/foot deviations: decreased arch bilateral         Flexibility/Screens:       Lower Extremity:  Decreased left lower extremity flexibility:Hip Flexors; Quadriceps and Hamstrings    Decreased right lower extremity flexibility:  Hip Flexors; Quadriceps; Hamstrings and Gastroc                                                      Knee Evaluation:  ROM:  Strength wnl knee: weak pelvic stabilizers.  AROM    Hyperextension:  Left:  0    Right: 0  Extension:  Left: 8    Right:  9  Flexion: Left: 121    Right: 120  PROM    Hyperextension: Left: 0    Right:  0  Extension: Left: 0    Right:  0  Flexion: Left: 125    Right:  132      Strength:      Extension:  Left: 4-/5   Weak/painful  Pain:      Right: 4-/5   Weak/painful  Pain:  Flexion:  Left: 5/5   Pain:      Right: 5/5   Pain:    Quad Set Left: Fair    Pain:   Quad Set Right: Fair    Pain:  Ligament Testing:  Normal                  Palpation:    Left knee tenderness present at:  Patellar Superior and Patellar Inferior  Right knee tenderness present at:  Patellar Superior and Patellar Inferior  Edema:  Edema of the knee: mild bilateral             General     ROS    Assessment/Plan:    Patient is a 76 year old male with both sides knee complaints.    Patient has the following significant findings with corresponding treatment plan.                Diagnosis 1:  Bilateral PF syndrome  Pain -  hot/cold therapy, self management, education and home program  Decreased ROM/flexibility - therapeutic exercise, therapeutic activity and home program  Decreased strength - therapeutic exercise, therapeutic activities and home program    Therapy Evaluation Codes:   1) History comprised of:   Personal factors that impact the plan of care:      None.    Comorbidity factors that impact the plan of care are:      Stroke.     Medications impacting care: High blood pressure and blood thinner and chloresterol medication.  2) Examination of Body Systems comprised of:   Body structures and functions that impact the plan of care:      Knee.   Activity limitations that impact the plan of care are:      Squatting/kneeling, Stairs and transfers.  3) Clinical presentation characteristics are:   Stable/Uncomplicated.  4) Decision-Making    Low complexity using standardized patient assessment instrument and/or measureable assessment of functional outcome.  Cumulative Therapy Evaluation is: Low complexity.    Previous and current functional limitations:  (See Goal Flow Sheet for this information)    Short term and Long term goals: (See Goal Flow Sheet for this information)     Communication ability:  Patient appears to be able to  clearly communicate and understand verbal and written communication and follow directions correctly.  Treatment Explanation - The following has been discussed with the patient:   RX ordered/plan of care  Anticipated outcomes  Possible risks and side effects  This patient would benefit from PT intervention to resume normal activities.   Rehab potential is good.    Frequency:  1 X week, once daily  Duration:  for 6 weeks  Discharge Plan:  Achieve all LTG.  Independent in home treatment program.  Reach maximal therapeutic benefit.    Please refer to the daily flowsheet for treatment today, total treatment time and time spent performing 1:1 timed codes.

## 2021-10-01 ENCOUNTER — THERAPY VISIT (OUTPATIENT)
Dept: PHYSICAL THERAPY | Facility: CLINIC | Age: 76
End: 2021-10-01
Payer: COMMERCIAL

## 2021-10-01 DIAGNOSIS — M25.561 PAIN IN BOTH KNEES, UNSPECIFIED CHRONICITY: ICD-10-CM

## 2021-10-01 DIAGNOSIS — M22.2X1 PATELLOFEMORAL PAIN SYNDROME OF BOTH KNEES: ICD-10-CM

## 2021-10-01 DIAGNOSIS — M22.2X2 PATELLOFEMORAL PAIN SYNDROME OF BOTH KNEES: ICD-10-CM

## 2021-10-01 DIAGNOSIS — M25.562 PAIN IN BOTH KNEES, UNSPECIFIED CHRONICITY: ICD-10-CM

## 2021-10-01 PROCEDURE — 97530 THERAPEUTIC ACTIVITIES: CPT | Mod: GP

## 2021-10-08 ENCOUNTER — THERAPY VISIT (OUTPATIENT)
Dept: PHYSICAL THERAPY | Facility: CLINIC | Age: 76
End: 2021-10-08
Payer: COMMERCIAL

## 2021-10-08 DIAGNOSIS — M25.562 PAIN IN BOTH KNEES, UNSPECIFIED CHRONICITY: ICD-10-CM

## 2021-10-08 DIAGNOSIS — M25.561 PAIN IN BOTH KNEES, UNSPECIFIED CHRONICITY: ICD-10-CM

## 2021-10-08 DIAGNOSIS — M22.2X2 PATELLOFEMORAL PAIN SYNDROME OF BOTH KNEES: ICD-10-CM

## 2021-10-08 DIAGNOSIS — M22.2X1 PATELLOFEMORAL PAIN SYNDROME OF BOTH KNEES: ICD-10-CM

## 2021-10-08 PROCEDURE — 97530 THERAPEUTIC ACTIVITIES: CPT | Mod: GP

## 2021-10-08 PROCEDURE — 97110 THERAPEUTIC EXERCISES: CPT | Mod: GP

## 2021-10-21 ENCOUNTER — THERAPY VISIT (OUTPATIENT)
Dept: PHYSICAL THERAPY | Facility: CLINIC | Age: 76
End: 2021-10-21
Payer: COMMERCIAL

## 2021-10-21 DIAGNOSIS — M25.562 PAIN IN BOTH KNEES, UNSPECIFIED CHRONICITY: ICD-10-CM

## 2021-10-21 DIAGNOSIS — M22.2X1 PATELLOFEMORAL PAIN SYNDROME OF BOTH KNEES: ICD-10-CM

## 2021-10-21 DIAGNOSIS — M25.561 PAIN IN BOTH KNEES, UNSPECIFIED CHRONICITY: ICD-10-CM

## 2021-10-21 DIAGNOSIS — M22.2X2 PATELLOFEMORAL PAIN SYNDROME OF BOTH KNEES: ICD-10-CM

## 2021-10-21 PROCEDURE — 97530 THERAPEUTIC ACTIVITIES: CPT | Mod: GP | Performed by: PHYSICAL THERAPIST

## 2021-10-21 PROCEDURE — 97110 THERAPEUTIC EXERCISES: CPT | Mod: GP | Performed by: PHYSICAL THERAPIST

## 2021-10-21 NOTE — PROGRESS NOTES
Subjective:  HPI  Physical Exam                    Objective:  System    Physical Exam    General     ROS    Assessment/Plan:    SUBJECTIVE  Subjective changes as noted by pt:  Pt notes increased pain since driving home from [place] yesterday     Current pain level: 4/10     Changes in function:  Pt notes pain with driving with the knees flexed greater than 90 degrees     Adverse reaction to treatment or activity:  None    OBJECTIVE  Changes in objective findings:  Trial of patellar taping to the left knee to correct lateral glide and tilt. Pt noted decreased pain with squats in the left knee after taping.         ASSESSMENT  John continues to require intervention to meet STG and LTG's: PT  Patient has experienced an exacerbation of symptoms.  Response to therapy has shown a worsening of  pain level  Progress made towards STG/LTG?  Pt had been progressing until recent set back    PLAN  Current treatment program is being advanced to more complex exercises.  The following procedures have been added:  splinting/taping/bracing/orthotics    PTA/ATC plan:  N/A    Please refer to the daily flowsheet for treatment today, total treatment time and time spent performing 1:1 timed codes.

## 2021-11-04 ENCOUNTER — THERAPY VISIT (OUTPATIENT)
Dept: PHYSICAL THERAPY | Facility: CLINIC | Age: 76
End: 2021-11-04
Payer: COMMERCIAL

## 2021-11-04 DIAGNOSIS — M22.2X1 PATELLOFEMORAL PAIN SYNDROME OF BOTH KNEES: ICD-10-CM

## 2021-11-04 DIAGNOSIS — M22.2X2 PATELLOFEMORAL PAIN SYNDROME OF BOTH KNEES: ICD-10-CM

## 2021-11-04 DIAGNOSIS — M25.562 PAIN IN BOTH KNEES, UNSPECIFIED CHRONICITY: ICD-10-CM

## 2021-11-04 DIAGNOSIS — M25.561 PAIN IN BOTH KNEES, UNSPECIFIED CHRONICITY: ICD-10-CM

## 2021-11-04 PROCEDURE — 97110 THERAPEUTIC EXERCISES: CPT | Mod: GP | Performed by: PHYSICAL THERAPIST

## 2021-11-04 PROCEDURE — 97530 THERAPEUTIC ACTIVITIES: CPT | Mod: GP | Performed by: PHYSICAL THERAPIST

## 2021-11-04 ASSESSMENT — ACTIVITIES OF DAILY LIVING (ADL)
RAW_SCORE: 42
STAND: ACTIVITY IS NOT DIFFICULT
SQUAT: I AM UNABLE TO DO THE ACTIVITY
PAIN: THE SYMPTOM AFFECTS MY ACTIVITY SLIGHTLY
SWELLING: I HAVE THE SYMPTOM BUT IT DOES NOT AFFECT MY ACTIVITY
AS_A_RESULT_OF_YOUR_KNEE_INJURY,_HOW_WOULD_YOU_RATE_YOUR_CURRENT_LEVEL_OF_DAILY_ACTIVITY?: ABNORMAL
GO DOWN STAIRS: ACTIVITY IS SOMEWHAT DIFFICULT
STIFFNESS: THE SYMPTOM AFFECTS MY ACTIVITY SEVERELY
LIMPING: I HAVE THE SYMPTOM BUT IT DOES NOT AFFECT MY ACTIVITY
KNEE_ACTIVITY_OF_DAILY_LIVING_SCORE: 60
KNEEL ON THE FRONT OF YOUR KNEE: ACTIVITY IS VERY DIFFICULT
HOW_WOULD_YOU_RATE_THE_OVERALL_FUNCTION_OF_YOUR_KNEE_DURING_YOUR_USUAL_DAILY_ACTIVITIES?: ABNORMAL
WEAKNESS: I HAVE THE SYMPTOM BUT IT DOES NOT AFFECT MY ACTIVITY
WALK: ACTIVITY IS NOT DIFFICULT
KNEE_ACTIVITY_OF_DAILY_LIVING_SUM: 42
RISE FROM A CHAIR: ACTIVITY IS VERY DIFFICULT
SIT WITH YOUR KNEE BENT: ACTIVITY IS MINIMALLY DIFFICULT
GIVING WAY, BUCKLING OR SHIFTING OF KNEE: I DO NOT HAVE THE SYMPTOM
HOW_WOULD_YOU_RATE_THE_CURRENT_FUNCTION_OF_YOUR_KNEE_DURING_YOUR_USUAL_DAILY_ACTIVITIES_ON_A_SCALE_FROM_0_TO_100_WITH_100_BEING_YOUR_LEVEL_OF_KNEE_FUNCTION_PRIOR_TO_YOUR_INJURY_AND_0_BEING_THE_INABILITY_TO_PERFORM_ANY_OF_YOUR_USUAL_DAILY_ACTIVITIES?: 50
GO UP STAIRS: ACTIVITY IS FAIRLY DIFFICULT

## 2021-11-04 NOTE — PROGRESS NOTES
Subjective:  HPI  Physical Exam                    Objective:  System                                                Knee Evaluation:  ROM:    AROM    Hyperextension:  Left:  0    Right: 0  Extension:  Left: 6    Right:  7  Flexion: Left: 130    Right: 128  PROM    Hyperextension: Left: 0    Right:  0  Extension: Left: 0    Right:  0  Flexion: Left: 135    Right:  136      Strength:     Extension:  Left: 5-/5   Pain:      Right: 5-/5   Pain:  Flexion:  Left: 5/5   Pain:      Right: 5/5   Pain:                        General     ROS    Assessment/Plan:    DISCHARGE REPORT    Progress reporting period is from 9-22-21 to 11-4-21.       SUBJECTIVE  Subjective changes noted by patient:  Pt notes slight decrease in pain. Pt notes increased strength and mobility in the knees.       Current pain level is 4/10  .     Previous pain level was  5/10  .   Changes in function:  Pt notes increased ease with ambulation but still notes pain with rising from a chair and kneeling  Adverse reaction to treatment or activity: None    OBJECTIVE  Changes noted in objective findings:  Pt notes short term relief of symptoms with patellar taping. Pt notes improved medial tracking of the patellae with active quad contraction. Pt remains weak with pelvic stabilizers.         ASSESSMENT/PLAN  Updated problem list and treatment plan: Diagnosis 1:  Bilateral PF syndrome  Pain -  hot/cold therapy, splint/taping/bracing/orthotics, self management, education and home program  Decreased ROM/flexibility - manual therapy, therapeutic exercise, therapeutic activity and home program  Decreased strength - therapeutic exercise, therapeutic activities and home program  STG/LTGs have been met or progress has been made towards goals:  Yes,   Assessment of Progress: The patient's condition is improving.  Self Management Plans:  Patient has been instructed in a home treatment program.  I have re-evaluated this patient and find that the nature, scope, duration  and intensity of the therapy is appropriate for the medical condition of the patient.  John continues to require the following intervention to meet STG and LTG's:  PT intervention is no longer required to meet STG/LTG.    Recommendations:  This patient is ready to be discharged from therapy and continue their home treatment program.    Please refer to the daily flowsheet for treatment today, total treatment time and time spent performing 1:1 timed codes.

## 2021-12-08 PROBLEM — M25.562 PAIN IN BOTH KNEES, UNSPECIFIED CHRONICITY: Status: RESOLVED | Noted: 2021-09-22 | Resolved: 2021-12-08

## 2021-12-08 PROBLEM — M25.561 PAIN IN BOTH KNEES, UNSPECIFIED CHRONICITY: Status: RESOLVED | Noted: 2021-09-22 | Resolved: 2021-12-08

## 2021-12-08 PROBLEM — M22.2X1 PATELLOFEMORAL PAIN SYNDROME OF BOTH KNEES: Status: RESOLVED | Noted: 2021-09-22 | Resolved: 2021-12-08

## 2021-12-08 PROBLEM — M22.2X2 PATELLOFEMORAL PAIN SYNDROME OF BOTH KNEES: Status: RESOLVED | Noted: 2021-09-22 | Resolved: 2021-12-08

## 2022-01-13 DIAGNOSIS — I10 ESSENTIAL HYPERTENSION: ICD-10-CM

## 2022-01-13 DIAGNOSIS — I63.9 CEREBROVASCULAR ACCIDENT (CVA), UNSPECIFIED MECHANISM (H): ICD-10-CM

## 2022-01-14 RX ORDER — CLOPIDOGREL BISULFATE 75 MG/1
75 TABLET ORAL DAILY
Qty: 90 TABLET | Refills: 0 | Status: SHIPPED | OUTPATIENT
Start: 2022-01-14 | End: 2022-04-19

## 2022-01-14 RX ORDER — LISINOPRIL 10 MG/1
10 TABLET ORAL DAILY
Qty: 90 TABLET | Refills: 0 | Status: SHIPPED | OUTPATIENT
Start: 2022-01-14 | End: 2022-04-19

## 2022-01-14 RX ORDER — ATORVASTATIN CALCIUM 20 MG/1
TABLET, FILM COATED ORAL
Qty: 90 TABLET | Refills: 0 | Status: SHIPPED | OUTPATIENT
Start: 2022-01-14 | End: 2022-04-19

## 2022-01-14 NOTE — TELEPHONE ENCOUNTER
Prescription approved per [facility] Refill Protocol.   MIGUELANGEL refill. Needs appointment.  Melissa iMllard RN

## 2022-04-10 ENCOUNTER — HEALTH MAINTENANCE LETTER (OUTPATIENT)
Age: 77
End: 2022-04-10

## 2022-04-19 ENCOUNTER — OFFICE VISIT (OUTPATIENT)
Dept: FAMILY MEDICINE | Facility: CLINIC | Age: 77
End: 2022-04-19
Payer: COMMERCIAL

## 2022-04-19 VITALS
BODY MASS INDEX: 30.8 KG/M2 | HEART RATE: 62 BPM | DIASTOLIC BLOOD PRESSURE: 83 MMHG | SYSTOLIC BLOOD PRESSURE: 133 MMHG | TEMPERATURE: 98 F | OXYGEN SATURATION: 99 % | HEIGHT: 71 IN | RESPIRATION RATE: 16 BRPM | WEIGHT: 220 LBS

## 2022-04-19 DIAGNOSIS — I63.9 CRYPTOGENIC STROKE (H): ICD-10-CM

## 2022-04-19 DIAGNOSIS — I63.9 CEREBROVASCULAR ACCIDENT (CVA), UNSPECIFIED MECHANISM (H): Primary | ICD-10-CM

## 2022-04-19 DIAGNOSIS — I10 ESSENTIAL HYPERTENSION: ICD-10-CM

## 2022-04-19 DIAGNOSIS — L57.0 AK (ACTINIC KERATOSIS): ICD-10-CM

## 2022-04-19 LAB
ANION GAP SERPL CALCULATED.3IONS-SCNC: 7 MMOL/L (ref 3–14)
BUN SERPL-MCNC: 20 MG/DL (ref 7–30)
CALCIUM SERPL-MCNC: 9 MG/DL (ref 8.5–10.1)
CHLORIDE BLD-SCNC: 108 MMOL/L (ref 94–109)
CHOLEST SERPL-MCNC: 115 MG/DL
CO2 SERPL-SCNC: 25 MMOL/L (ref 20–32)
CREAT SERPL-MCNC: 1.05 MG/DL (ref 0.66–1.25)
ERYTHROCYTE [DISTWIDTH] IN BLOOD BY AUTOMATED COUNT: 12.1 % (ref 10–15)
FASTING STATUS PATIENT QL REPORTED: YES
GFR SERPL CREATININE-BSD FRML MDRD: 72 ML/MIN/1.73M2
GLUCOSE BLD-MCNC: 93 MG/DL (ref 70–99)
HCT VFR BLD AUTO: 46.5 % (ref 40–53)
HDLC SERPL-MCNC: 63 MG/DL
HGB BLD-MCNC: 15.9 G/DL (ref 13.3–17.7)
LDLC SERPL CALC-MCNC: 41 MG/DL
MCH RBC QN AUTO: 34.9 PG (ref 26.5–33)
MCHC RBC AUTO-ENTMCNC: 34.2 G/DL (ref 31.5–36.5)
MCV RBC AUTO: 102 FL (ref 78–100)
NONHDLC SERPL-MCNC: 52 MG/DL
PLATELET # BLD AUTO: 221 10E3/UL (ref 150–450)
POTASSIUM BLD-SCNC: 4.2 MMOL/L (ref 3.4–5.3)
RBC # BLD AUTO: 4.55 10E6/UL (ref 4.4–5.9)
SODIUM SERPL-SCNC: 140 MMOL/L (ref 133–144)
TRIGL SERPL-MCNC: 57 MG/DL
WBC # BLD AUTO: 5.4 10E3/UL (ref 4–11)

## 2022-04-19 PROCEDURE — 99214 OFFICE O/P EST MOD 30 MIN: CPT | Performed by: FAMILY MEDICINE

## 2022-04-19 PROCEDURE — 80048 BASIC METABOLIC PNL TOTAL CA: CPT | Performed by: FAMILY MEDICINE

## 2022-04-19 PROCEDURE — 80061 LIPID PANEL: CPT | Performed by: FAMILY MEDICINE

## 2022-04-19 PROCEDURE — 85027 COMPLETE CBC AUTOMATED: CPT | Performed by: FAMILY MEDICINE

## 2022-04-19 PROCEDURE — 36415 COLL VENOUS BLD VENIPUNCTURE: CPT | Performed by: FAMILY MEDICINE

## 2022-04-19 RX ORDER — CLOPIDOGREL BISULFATE 75 MG/1
75 TABLET ORAL DAILY
Qty: 90 TABLET | Refills: 4 | Status: SHIPPED | OUTPATIENT
Start: 2022-04-19

## 2022-04-19 RX ORDER — ATORVASTATIN CALCIUM 20 MG/1
20 TABLET, FILM COATED ORAL DAILY
Qty: 90 TABLET | Refills: 4 | Status: SHIPPED | OUTPATIENT
Start: 2022-04-19

## 2022-04-19 RX ORDER — LISINOPRIL 10 MG/1
10 TABLET ORAL DAILY
Qty: 90 TABLET | Refills: 4 | Status: SHIPPED | OUTPATIENT
Start: 2022-04-19 | End: 2023-11-16

## 2022-04-19 NOTE — PROGRESS NOTES
"  Assessment & Plan     Cerebrovascular accident (CVA), unspecified mechanism (H)  Continue current medication.  - atorvastatin (LIPITOR) 20 MG tablet; Take 1 tablet (20 mg) by mouth daily  - clopidogrel (PLAVIX) 75 MG tablet; Take 1 tablet (75 mg) by mouth daily  - Lipid panel reflex to direct LDL Fasting  - CBC with platelets    Cryptogenic stroke (H)  Continue current medication.    Essential hypertension  Currently controlled, continue current medication.  - BASIC METABOLIC PANEL  - lisinopril (ZESTRIL) 10 MG tablet; Take 1 tablet (10 mg) by mouth daily    AK (actinic keratosis)  Follow-up with dermatology.  - Adult Dermatology Referral; Future             BMI:   Estimated body mass index is 31.12 kg/m  as calculated from the following:    Height as of this encounter: 1.791 m (5' 10.5\").    Weight as of this encounter: 99.8 kg (220 lb).           Return in about 1 year (around 4/19/2023) for medication recheck.    Cortez Kenny MD  [facility]    Bernarda Nascimento is a 77 year old who presents for the following health issues     Patient with history of stroke, occurring on 12/10/16.  Patient had sudden left upper extremity weakness and left facial droop with dysarthria and expressive aphasia.  Was on aspirin prior to stroke so transition to Plavix. Taking statin as well. Patient has had resolution of neurologic symptoms. Doing well on Plavix without bleeding. Echocardiogram normal other than PFO noted.  Otherwise had normal CT angiogram of head and neck.     MRI brain 12/10/16  IMPRESSION:    1. Recent posterior right frontal infarct.  2. Diffuse cerebral volume loss and cerebral white matter changes  consistent with chronic small vessel ischemic disease.    Patient with history of hypertension. Well controlled with lisinopril.     Review of Systems         Objective    /83 (BP Location: Right arm, Patient Position: Chair, Cuff Size: Adult Large)   Pulse 62   Temp 98  F (36.7 " " C) (Oral)   Resp 16   Ht 1.791 m (5' 10.5\")   Wt 99.8 kg (220 lb)   SpO2 99%   BMI 31.12 kg/m    Body mass index is 31.12 kg/m .  Physical Exam   GENERAL: healthy, alert and no distress  CV: regular rate and rhythm, normal S1 S2, no S3 or S4, no murmur, click or rub, no peripheral edema and peripheral pulses strong  SKIN: Multiple AK's on face            "

## 2022-08-09 ENCOUNTER — OFFICE VISIT (OUTPATIENT)
Dept: DERMATOLOGY | Facility: CLINIC | Age: 77
End: 2022-08-09
Attending: FAMILY MEDICINE
Payer: COMMERCIAL

## 2022-08-09 DIAGNOSIS — C44.41 BASAL CELL CARCINOMA (BCC) OF MULTIPLE SITES OF HEAD AND NECK: Primary | ICD-10-CM

## 2022-08-09 DIAGNOSIS — L57.0 AK (ACTINIC KERATOSIS): ICD-10-CM

## 2022-08-09 PROCEDURE — 17004 DESTROY PREMAL LESIONS 15/>: CPT | Performed by: PHYSICIAN ASSISTANT

## 2022-08-09 PROCEDURE — 99203 OFFICE O/P NEW LOW 30 MIN: CPT | Mod: 25 | Performed by: PHYSICIAN ASSISTANT

## 2022-08-09 ASSESSMENT — PAIN SCALES - GENERAL: PAINLEVEL: NO PAIN (0)

## 2022-08-09 NOTE — PROGRESS NOTES
HPI:   Chief complaints: John Riley is a pleasant 77 year old male who presents for evaluation of spots on the face. He had biopsies in 2018 which revealed BCC x 2. He canceled the appt for mohs and never rescheduled.  He also has several scaly spots      PHYSICAL EXAM:    There were no vitals taken for this visit.  Skin exam performed as follows: Type 2 skin. Mood appropriate  Alert and Oriented X 3. Well developed, well nourished in no distress.  General appearance: Normal  Head including face: Normal  Eyes: conjunctiva and lids: Normal  Mouth: Lips, teeth, gums: Normal  Neck: Normal  Cardiovascular: Exam of peripheral vascular system by observation for swelling, varicosities, edema: Normal  Right upper extremity: Normal  Left upper extremity: Normal  Right lower extremity: Normal  Left lower extremity: Normal  Skin: Scalp and body hair: See below    12 mm pearly plaque on the left superior helix  Scar with tiny pink macule along the inferior border on the right PA cheek and tragus  Pink gritty papules on the right forehead x 3, right temple x 2, right cheek x 3, left forehead x 4, left temple x 4, left cheek x 2    ASSESSMENT/PLAN:     1. Biopsy proven BCC from 2018 on the left superior helix, right PA cheek/tragus. He canceled his mohs appts and never rescheduled. Will schedule for these again.   2. Actinic keratosis on the right forehead x 3, right temple x 2, right cheek x 3, left forehead x 4, left temple x 4, left cheek x 2. As precancerous, cryosurgery performed. Advised on blistering and post-op care. Advised if not resolved in 1-2 months to return for evaluation            Follow-up: mohs  CC:   Scribed By: Jeannie Toussaint, MS, PA-C

## 2022-08-09 NOTE — LETTER
[address]  [id number] [phone]        8/9/2022       John Riley  [address] [facility] [address]      Dear John:    You are scheduled for Mohs Surgery on: November 10 at 8:30 am and November 17 at 8:30 am.     Please check in at [facility], [address].     You don't need to arrive more than 5-10 minutes prior to your appointment time.     Be sure to eat a good breakfast and bathe and wash your hair prior to surgery.     If you are taking any anti-coagulants that are prescribed by your Doctor (such as Coumadin/Warfarin, Plavix, Aspirin, Ibuprofen), please continue taking them.     However, if you are taking anti-coagulants over the counter without a Doctor's order for a medical condition, please discontinue them 10 days prior to surgery.     Please wear loose comfortable clothing as it could possibly be 4-6 hours until your surgery is completed depending upon how many layers of tissue need to be removed.      Thank you,    MARCE Watson MD

## 2022-08-09 NOTE — LETTER
[address]  [id number] [phone]        8/9/2022       John Riley  [address] [facility] [address]      Dear John:    You are scheduled for Mohs Surgery on: 11/10/22 at 8:30 am and November 17 at 8:30 am.    Please check in at [facility], [address].     You don't need to arrive more than 5-10 minutes prior to your appointment time.     Be sure to eat a good breakfast and bathe and wash your hair prior to surgery.     If you are taking any anti-coagulants that are prescribed by your Doctor (such as Coumadin/Warfarin, Plavix, Aspirin, Ibuprofen), please continue taking them.     However, if you are taking anti-coagulants over the counter without a Doctor's order for a medical condition, please discontinue them 10 days prior to surgery.     Please wear loose comfortable clothing as it could possibly be 4-6 hours until your surgery is completed depending upon how many layers of tissue need to be removed.      Thank you,    MARCE Watson MD

## 2022-08-09 NOTE — LETTER
8/9/2022         RE: John Riley  [address] [facility] [address]        Dear Colleague,    Thank you for referring your patient, John Riley, to the [facility]. Please see a copy of my visit note below.    HPI:   Chief complaints: John Riley is a pleasant 77 year old male who presents for evaluation of ***      PHYSICAL EXAM:    There were no vitals taken for this visit.  Skin exam performed as follows: Type *** skin. Mood appropriate  Alert and Oriented X 3. Well developed, well nourished in no distress.  General appearance: Normal  Head including face: Normal  Eyes: conjunctiva and lids: Normal  Mouth: Lips, teeth, gums: Normal  Neck: Normal  Cardiovascular: Exam of peripheral vascular system by observation for swelling, varicosities, edema: Normal  Right upper extremity: Normal  Left upper extremity: Normal  Right lower extremity: Normal  Left lower extremity: Normal  Skin: Scalp and body hair: See below    12 mm pearly plaque on the left superior helix  Scar with tiny pink macule along the inferior border on the right PA cheek and tragus  Pink gritty papules on the right forehead x 3, right temple x 2, right cheek x 3, left forehead x 4, left temple x 4, left cheek x 2    ASSESSMENT/PLAN:     1. Biopsy proven BCC from 2018 on the left superior helix, right PA cheek/tragus. He canceled his mohs appts and never rescheduled. Will schedule for these again.   2. Actinic keratosis on the right forehead x 3, right temple x 2, right cheek x 3, left forehead x 4, left temple x 4, left cheek x 2. As precancerous, cryosurgery performed. Advised on blistering and post-op care. Advised if not resolved in 1-2 months to return for evaluation            Follow-up: mohs  CC:   Scribed By: Jeannie Toussaint MS, ALLAN          Again, thank you for allowing me to participate in the care of your patient.        Sincerely,        Jeannie Toussaint PA-C

## 2022-10-16 ENCOUNTER — HEALTH MAINTENANCE LETTER (OUTPATIENT)
Age: 77
End: 2022-10-16

## 2022-11-09 NOTE — PROGRESS NOTES
Surgical Office Location:  [facility]  [address] [place] [id number]

## 2022-11-10 ENCOUNTER — OFFICE VISIT (OUTPATIENT)
Dept: DERMATOLOGY | Facility: CLINIC | Age: 77
End: 2022-11-10
Payer: COMMERCIAL

## 2022-11-10 DIAGNOSIS — C44.219 BASAL CELL CARCINOMA (BCC) OF ANTIHELIX OF LEFT EAR: Primary | ICD-10-CM

## 2022-11-10 PROCEDURE — 17312 MOHS ADDL STAGE: CPT | Performed by: DERMATOLOGY

## 2022-11-10 PROCEDURE — 88331 PATH CONSLTJ SURG 1 BLK 1SPC: CPT | Mod: 59 | Performed by: DERMATOLOGY

## 2022-11-10 PROCEDURE — 17311 MOHS 1 STAGE H/N/HF/G: CPT | Performed by: DERMATOLOGY

## 2022-11-10 PROCEDURE — 15260 FTH/GFT FR N/E/E/L 20 SQCM/<: CPT | Performed by: DERMATOLOGY

## 2022-11-10 PROCEDURE — 99212 OFFICE O/P EST SF 10 MIN: CPT | Mod: 57 | Performed by: DERMATOLOGY

## 2022-11-10 PROCEDURE — 69100 BIOPSY OF EXTERNAL EAR: CPT | Performed by: DERMATOLOGY

## 2022-11-10 NOTE — PROGRESS NOTES
John Riley is an extremely pleasant 77 year old year old male patient here today for evaluation and managment of likely basal cell carcinoma on left ear and r tragus. Bx in 2018.  Never treated.  Patient has no other skin complaints today.  Remainder of the HPI, Meds, PMH, Allergies, FH, and SH was reviewed in chart.      Past Medical History:   Diagnosis Date     Basal cell carcinoma     early 90's       No past surgical history on file.     Family History   Problem Relation Age of Onset     Heart Disease Mother        Social History     Socioeconomic History     Marital status:      Spouse name: Not on file     Number of children: 1     Years of education: Not on file     Highest education level: Not on file   Occupational History     Occupation:      Comment: retired   Tobacco Use     Smoking status: Never     Smokeless tobacco: Never   Vaping Use     Vaping Use: Never used   Substance and Sexual Activity     Alcohol use: Yes     Drug use: No     Sexual activity: Yes     Partners: Female   Other Topics Concern     Parent/sibling w/ CABG, MI or angioplasty before 65F 55M? No   Social History Narrative     Not on file     Social Determinants of Health     Financial Resource Strain: Not on file   Food Insecurity: Not on file   Transportation Needs: Not on file   Physical Activity: Not on file   Stress: Not on file   Social Connections: Not on file   Intimate Partner Violence: Not on file   Housing Stability: Not on file       Outpatient Encounter Medications as of 11/10/2022   Medication Sig Dispense Refill     Ascorbic Acid (VITAMIN C PO) Take 1,000 mg by mouth daily       atorvastatin (LIPITOR) 20 MG tablet Take 1 tablet (20 mg) by mouth daily 90 tablet 4     CALCIUM PO Take 1 tablet by mouth daily       clopidogrel (PLAVIX) 75 MG tablet Take 1 tablet (75 mg) by mouth daily 90 tablet 4     Cyanocobalamin (VITAMIN B-12 PO) Take 1 tablet by mouth daily       lisinopril (ZESTRIL) 10 MG  tablet Take 1 tablet (10 mg) by mouth daily 90 tablet 4     multivitamin, therapeutic with minerals (THERA-VIT-M) TABS tablet Take 1 tablet by mouth daily       TURMERIC PO Take 1 capsule by mouth daily       vitamin B complex with vitamin C (STRESS TAB) tablet Take 1 tablet by mouth daily       VITAMIN D, CHOLECALCIFEROL, PO Take by mouth daily       No facility-administered encounter medications on file as of 11/10/2022.             O:   NAD, WDWN, Alert & Oriented, Mood & Affect wnl, Vitals stable   Here today alone   General appearance normal   Vitals stable   Alert, oriented and in no acute distress     R tragus pink pearly papule   L antihelix 2cm ulcerated pink pearly papule       Eyes: Conjunctivae/lids:Normal     ENT: Lips, buccal mucosa, tongue: normal    MSK:Normal    Cardiovascular: peripheral edema none    Pulm: Breathing Normal    Neuro/Psych: Orientation:Alert and Orientedx3 ; Mood/Affect:normal       MICRO:   L antihelix :Orthokeratosis of epidermis with a proliferation of nests of basaloid cells, with peripheral palisading and a haphazard arrangement in the center extending into the dermis, forming nodules.  The tumor cells have hyperchromatic nuclei. Poor cytoplasm and intercellular bridging.    A/P:  1. L antihelix r/o basal cell carcinoma bx in 2018  TANGENTIAL BIOPSY IN HOUSE:  After consent, anesthesia with LEC and prep, tangential excision performed and dx above confirmed with frozen section histology.  No complications and routine wound care.  Patient is on  anticoagulants and risk of bleeding discussed with patient.       I have personally reviewed all specimens and/or slides and used them with my medical judgement to determine or confirm the final diagnosis.     Patient told result basal cell carcinoma .    It was a pleasure speaking to John Riley today.  Previous clinic notes and pertinent laboratory tests were reviewed prior to John Riley's visit.  Patient encouraged to  perform monthly skin exams.  UV precautions reviewed with patient.  Risks of non-melanoma skin cancer discussed with patient   Return to clinic next appt for r ear    PROCEDURE NOTE  L antihelix basal cell carcinoma   MOHS:   Location    The rationale for Mohs surgery was discussed with the patient and consent was obtained.  The risks and benefits as well as alternatives to therapy were discussed, in detail.  Specifically, the risks of infection, scarring, bleeding, prolonged wound healing, incomplete removal, allergy to anesthesia, nerve injury and recurrence were addressed.  Indication for Mohs was Location. Prior to the procedure, the treatment site was clearly identified and, if available, confirmed with previous photos and confirmed by the patient   All components of the Universal Protocol/PAUSE rule were completed.  The Mohs surgeon operated in two distinct and integrated capacities as the surgeon and pathologist.      The area was prepped with Betasept.  A rim of normal appearing skin was marked circumferentially around the lesion.  The area was infiltrated with local anesthesia.  The tumor was first debulked to remove all clinically apparent tumor.  An incision following the standard Mohs approach was done and the specimen was oriented,mapped and placed in 2 block(s).  Each specimen was then chromacoded and processed in the Mohs laboratory using standard Mohs technique and submitted for frozen section histology.  Frozen section analysis showed residual tumor but CLEAR MARGINS.      First stage: Orthokeratosis of epidermis with a proliferation of nests of basaloid cells, with peripheral palisading and a haphazard arrangement in the center extending into the dermis, forming nodules.  The tumor cells have hyperchromatic nuclei. Poor cytoplasm and intercellular bridging.      The tumor was excised using standard Mohs technique in 2 stages(s).  CLEAR MARGINS OBTAINED and Final defect size was 3 cm.     We  discussed the options for wound management in full with the patient including risks/benefits/ possible outcomes.      Interpolation flap discussed with patient he declines   REPAIR FTSG FROM POSTAURICULAR: Because of the full-thickness nature of the defect and to avoid distortion, a full-thickness skin graft was planned. After LEC anesthesia and prep, a template was made of the defect and the graft was harvested from the ipsilateral post-auricular sulcus.  The graft was defatted and trimmed to fit the defect. It was sutured into place with Fast Absorbing Plain Gut suture and a taped Bolster dressing was applied.    The donor site was converted to a fusiform defect, and after hemostasis, was closed with subcutaneous stitches using Vicryl sutures.   EBL minimal; complications none; wound care routine.  The patient was discharged in good condition and will return on one week  for wound evaluation.

## 2022-11-10 NOTE — PATIENT INSTRUCTIONS
Skin Graft Wound Care     For left ear          No strenuous activity for 48 hours. Resume moderate activity in 48 hours.  No heavy exercising until you are seen for follow up in one week.    Take Tylenol as needed for discomfort.                       No alcoholic beverages for 48 hours.    Leave the bandage in place until you come in for follow up in one week.  If the bandage becomes blood tinged or loose, reinforce it with gauze and tape.        (Refer to the reverse side of this page for management of bleeding)    Keep the bandage dry. Wash around it carefully.    If the tape becomes soiled or starts to come off, reinforce it with additional paper tape.    Do not smoke for 3 weeks; smoking is detrimental to wound healing and may cause the graft to die.    Avoid prolonged exposure to extremely cold temperatures for 3 weeks.    It is normal to have swelling and bruising around the surgical site. The bruising will fade in approximately 10-14 days. Elevate the area to reduce swelling.    Numbness, itchiness and sensitivity to temperature changes can occur after surgery and may take up to 18 months to normalize.        POSSIBLE COMPLICATIONS      BLEEDING:    Leave the bandage in place.  Use tightly rolled up gauze or a cloth to apply direct pressure over the bandage for 20   minutes.  Reapply pressure for an additional 20 minutes if necessary  Call the office or go to the nearest emergency room if pressure fails to stop the bleeding.  Use additional gauze and tape to maintain pressure once the bleeding has stopped.        PAIN:    Post operative pain should slowly get better, beginning the evening after surgery.  A sudden or severe increase in pain may indicate a problem. Call the office if this occurs.      In case of emergency phone:Dr Watson [phone]

## 2022-11-16 NOTE — PROGRESS NOTES
Surgical Office Location:  [facility]  [address] [place] [id number]

## 2022-11-17 ENCOUNTER — OFFICE VISIT (OUTPATIENT)
Dept: DERMATOLOGY | Facility: CLINIC | Age: 77
End: 2022-11-17
Payer: COMMERCIAL

## 2022-11-17 DIAGNOSIS — C44.212 BASAL CELL CARCINOMA (BCC) OF TRAGUS OF RIGHT EAR: Primary | ICD-10-CM

## 2022-11-17 PROCEDURE — 17311 MOHS 1 STAGE H/N/HF/G: CPT | Mod: 79 | Performed by: DERMATOLOGY

## 2022-11-17 PROCEDURE — 13151 CMPLX RPR E/N/E/L 1.1-2.5 CM: CPT | Mod: 79 | Performed by: DERMATOLOGY

## 2022-11-17 NOTE — PATIENT INSTRUCTIONS
Sutured Wound Care for R ear    [facility]: [phone]    [facility]: [phone]      No strenuous activity for 48 hours. Resume moderate activity in 48 hours. No heavy exercising until you are seen for follow up in one week.     Take Tylenol as needed for discomfort.                         Do not drink alcoholic beverages for 48 hours.     Keep the pressure bandage in place for 24 hours. If the bandage becomes blood tinged or loose, reinforce it with gauze and tape.        (Refer to the reverse side of this page for management of bleeding).    Remove pressure bandage in 24 hours     Leave the flat bandage in place until your follow up appointment.    Keep the bandage dry. Wash around it carefully.    If the tape becomes soiled or starts to come off, reinforce it with additional paper tape.    Do not smoke for 3 weeks; smoking is detrimental to wound healing.    It is normal to have swelling and bruising around the surgical site. The bruising will fade in approximately 10-14 days. Elevate the area to reduce swelling.    Numbness, itchiness and sensitivity to temperature changes can occur after surgery and may take up to 18 months to normalize.      POSSIBLE COMPLICATIONS    BLEEDING:    Leave the bandage in place.  Use tightly rolled up gauze or a cloth to apply direct pressure over the bandage for 20   minutes.  Reapply pressure for an additional 20 minutes if necessary  Call the office or go to the nearest emergency room if pressure fails to stop the bleeding.  Use additional gauze and tape to maintain pressure once the bleeding has stopped.        PAIN:    Post operative pain should slowly get better, never worse.  A severe increase in pain may indicate a problem. Call the office if this occurs.    In case of emergency phone:Dr Watson [phone]        WOUND CARE INSTRUCTIONS  for  ONE WEEK AFTER SURGERY R ear          Leave flat bandage on your skin for one week after today s  bandage change.  In one week when you remove the bandage, you may resume your regular skin care routine, including washing with mild soap and water, applying moisturizer, make-up and sunscreen.    If there are any open or bleeding areas at the incision/graft site you should begin to cover the area with a bandage daily as follows:    Clean and dry the area with plain tap water using a Q-tip or sterile gauze pad.  Apply Polysporin or Bacitracin ointment to the open area.  Cover the wound with a band-aid or a sterile non-stick gauze pad and micropore paper tape.         SIGNS OF INFECTION  - If you notice any of these signs of infection, call your doctor right away: expanding redness around the wound.  - Yellow or greenish-colored pus or cloudy wound drainage.    - Red streaking spreading from the wound.  - Increased swelling, tenderness, or pain around the wound.   - Fever.    Please remember that yellow and clear drainage from a wound can be normal and related to normal wound healing.  Isolated drainage from a wound without a combination of the above features does not indicate infection.       *Once the bandages are removed, the scar will be red and firm (especially in the lip/chin area). This is normal and will fade in time. It might take 6-12 months for this to happen.     *Massaging the area will help the scar soften and fade quicker. Begin to massage the area one month after the bandages have been removed. To massage apply pressure directly and firmly over the scar with the fingertips and move in a circular motion. Massage the area for a few minutes several times a day. Continue to massage the site for several months.    *Approximately 6-8 weeks after surgery it is not uncommon to see the formation of  tender pimple-like  bump along the scar. This is normal. As the scar continues to mature and the stitches underneath the skin begin to dissolve, this might occur. Do not pick or squeeze, this will resolve on it s  own. Should one break open producing a small amount of drainage, apply Polysporin or Bacitracin ointment a few times a day until the wound is completely healed.    *Numbness in the surgical area is expected. It might take 12-18 months for the feeling to return to normal. During this time sensations of itchiness, tingling and occasional sharp pains might be noted. These feelings are normal and will subside once the nerves have completely healed.         IN CASE OF EMERGENCY: Dr Watson [phone]     If you were seen in [place] call: [phone]    If you were seen in [place] call: [phone]     ONE WEEK DRESSING CHANGE  for  SKIN GRAFTS    The following information has been compiled to offer you assistance with the dressing change or wound evaluation. Please feel free to call our office to speak with one of the nurses if you have any questions or concerns about the progress of the wound healing process especially if there are any signs of graft necrosis or infection. We will be happy to answer any questions you might have.                                                             AFTER 24 HOURS YOU SHOULD REMOVE THE BANDAGE AND BEGIN DAILY DRESSING CHANGES AS FOLLOWS:     1) Remove Dressing.     2) Clean and dry the area with tap water using a Q-tip or sterile gauze pad.     3) Apply Vaseline, Polysporin ointment, Aquaphor or Bacitracin ointment over entire wound.  Do NOT use Neosporin ointment.     4) Cover the wound with a band-aid, or a sterile non-stick gauze pad and micropore paper tape    REPEAT THESE INSTRUCTIONS AT LEAST ONCE A DAY UNTIL THE WOUND HAS COMPLETELY HEALED. DO NOT LET THE WOUND SCAB OVER.    It is an old wives tale that a wound heals better when it is exposed to air and allowed to dry out. The wound will heal faster with a better cosmetic result if it is kept moist with ointment and covered with a bandage.     Massaging the wound site hastens the healing process by softening the scar  tissue and fading the scar. Begin massaging the area one month after surgery as often as possible. Continue to massage the area for 2-3 months or until you feel the scar tissue has softened. Moisturizers can be used during the massaging but are not necessary. Ultimately it takes six months for the graft to   heal and blend into the surrounding skin.     WOUND CARE INSTRUCTIONS  for  ONE WEEK AFTER SURGERY BEHIND LEFT EAR:          Leave flat bandage on your skin for one week after today s bandage change.  In one week when you remove the bandage, you may resume your regular skin care routine, including washing with mild soap and water, applying moisturizer, make-up and sunscreen.    If there are any open or bleeding areas at the incision/graft site you should begin to cover the area with a bandage daily as follows:    Clean and dry the area with plain tap water using a Q-tip or sterile gauze pad.  Apply Polysporin or Bacitracin ointment to the open area.  Cover the wound with a band-aid or a sterile non-stick gauze pad and micropore paper tape.         SIGNS OF INFECTION  - If you notice any of these signs of infection, call your doctor right away: expanding redness around the wound.  - Yellow or greenish-colored pus or cloudy wound drainage.    - Red streaking spreading from the wound.  - Increased swelling, tenderness, or pain around the wound.   - Fever.    Please remember that yellow and clear drainage from a wound can be normal and related to normal wound healing.  Isolated drainage from a wound without a combination of the above features does not indicate infection.       *Once the bandages are removed, the scar will be red and firm (especially in the lip/chin area). This is normal and will fade in time. It might take 6-12 months for this to happen.     *Massaging the area will help the scar soften and fade quicker. Begin to massage the area one month after the bandages have been removed. To massage apply  pressure directly and firmly over the scar with the fingertips and move in a circular motion. Massage the area for a few minutes several times a day. Continue to massage the site for several months.    *Approximately 6-8 weeks after surgery it is not uncommon to see the formation of  tender pimple-like  bump along the scar. This is normal. As the scar continues to mature and the stitches underneath the skin begin to dissolve, this might occur. Do not pick or squeeze, this will resolve on it s own. Should one break open producing a small amount of drainage, apply Polysporin or Bacitracin ointment a few times a day until the wound is completely healed.    *Numbness in the surgical area is expected. It might take 12-18 months for the feeling to return to normal. During this time sensations of itchiness, tingling and occasional sharp pains might be noted. These feelings are normal and will subside once the nerves have completely healed.         IN CASE OF EMERGENCY: Dr Watson [phone]     If you were seen in [place] call: [phone]    If you were seen in [place] call: [phone]

## 2022-11-17 NOTE — PROGRESS NOTES
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  L ear h ealing well.  Patient has no other skin complaints today.  Remainder of the HPI, Meds, PMH, Allergies, FH, and SH was reviewed in chart.      Past Medical History:   Diagnosis Date     Basal cell carcinoma     early 90's       No past surgical history on file.     Family History   Problem Relation Age of Onset     Heart Disease Mother        Social History     Socioeconomic History     Marital status:      Spouse name: Not on file     Number of children: 1     Years of education: Not on file     Highest education level: Not on file   Occupational History     Occupation:      Comment: retired   Tobacco Use     Smoking status: Never     Smokeless tobacco: Never   Vaping Use     Vaping Use: Never used   Substance and Sexual Activity     Alcohol use: Yes     Drug use: No     Sexual activity: Yes     Partners: Female   Other Topics Concern     Parent/sibling w/ CABG, MI or angioplasty before 65F 55M? No   Social History Narrative     Not on file     Social Determinants of Health     Financial Resource Strain: Not on file   Food Insecurity: Not on file   Transportation Needs: Not on file   Physical Activity: Not on file   Stress: Not on file   Social Connections: Not on file   Intimate Partner Violence: Not on file   Housing Stability: Not on file       Outpatient Encounter Medications as of 11/17/2022   Medication Sig Dispense Refill     Ascorbic Acid (VITAMIN C PO) Take 1,000 mg by mouth daily       atorvastatin (LIPITOR) 20 MG tablet Take 1 tablet (20 mg) by mouth daily 90 tablet 4     CALCIUM PO Take 1 tablet by mouth daily       clopidogrel (PLAVIX) 75 MG tablet Take 1 tablet (75 mg) by mouth daily 90 tablet 4     Cyanocobalamin (VITAMIN B-12 PO) Take 1 tablet by mouth daily       lisinopril (ZESTRIL) 10 MG tablet Take 1 tablet  (10 mg) by mouth daily 90 tablet 4     multivitamin, therapeutic with minerals (THERA-VIT-M) TABS tablet Take 1 tablet by mouth daily       TURMERIC PO Take 1 capsule by mouth daily       vitamin B complex with vitamin C (STRESS TAB) tablet Take 1 tablet by mouth daily       VITAMIN D, CHOLECALCIFEROL, PO Take by mouth daily       No facility-administered encounter medications on file as of 11/17/2022.             O:   NAD, WDWN, Alert & Oriented, Mood & Affect wnl, Vitals stable   Here today alone    General appearance normal   Vitals stable   Alert, oriented and in no acute distress     R tragus pink scaly papule       Eyes: Conjunctivae/lids:Normal     ENT: Lips, buccal mucosa, tongue: normal    MSK:Normal    Cardiovascular: peripheral edema none    Pulm: Breathing Normal    Neuro/Psych: Orientation:Alert and Orientedx3 ; Mood/Affect:normal       A/P:  1. R tragus basal cell carcinoma   MOHS:   Location    The rationale for Mohs surgery was discussed with the patient and consent was obtained.  The risks and benefits as well as alternatives to therapy were discussed, in detail.  Specifically, the risks of infection, scarring, bleeding, prolonged wound healing, incomplete removal, allergy to anesthesia, nerve injury and recurrence were addressed.  Indication for Mohs was Location. Prior to the procedure, the treatment site was clearly identified and, if available, confirmed with previous photos and confirmed by the patient   All components of the Universal Protocol/PAUSE rule were completed.  The Mohs surgeon operated in two distinct and integrated capacities as the surgeon and pathologist.      The area was prepped with Betasept.  A rim of normal appearing skin was marked circumferentially around the lesion.  The area was infiltrated with local anesthesia.  The tumor was first debulked to remove all clinically apparent tumor.  An incision following the standard Mohs approach was done and the specimen was  oriented,mapped and placed in 1 block(s).  Each specimen was then chromacoded and processed in the Mohs laboratory using standard Mohs technique and submitted for frozen section histology.  Frozen section analysis showed no residual tumor but CLEAR MARGINS.      The tumor was excised using standard Mohs technique in 1 stages(s).  CLEAR MARGINS OBTAINED and Final defect size was 1 cm.     We discussed the options for wound management in full with the patient including risks/benefits/ possible outcomes.        REPAIR COMPLEX: Because of the tightness of the surrounding skin and Because of the size and full thickness nature of the defect, Because of the tightness of the surrounding skin, To maintain form and function and Because of the proximity to the ear, a complex closure was planned. After LE anesthesia and prep, Burow's triangles were excised in the relaxed skin tension lines. The wound edges were widely undermined greater than width of the defect on both sides by dissection in the subcutaneous plane until adequate tissue mobility was obtained. Hemostasis was obtained. The wound edges were closed in a layered fashion using Vicryl and Fast Absorbing Plain Gut sutures. Postoperative length was 1.7 cm.   EBL minimal; complications none; wound care routine.  The patient was discharged in good condition and will return in one week for wound evaluation.  It was a pleasure speaking to John Riley today.  Previous clinic notes and pertinent laboratory tests were reviewed prior to John Riley's visit.  Signs and Symptoms of skin cancer discussed with patient.  Patient encouraged to perform monthly skin exams.  UV precautions reviewed with patient.  Risks of non-melanoma skin cancer discussed with patient   Return to clinic 6 months

## 2023-06-01 ENCOUNTER — HEALTH MAINTENANCE LETTER (OUTPATIENT)
Age: 78
End: 2023-06-01

## 2023-11-16 ENCOUNTER — HOSPITAL ENCOUNTER (INPATIENT)
Facility: CLINIC | Age: 78
LOS: 5 days | Discharge: NURSING, REHAB OR LONG-TERM CARE | DRG: 522 | End: 2023-11-21
Attending: EMERGENCY MEDICINE | Admitting: INTERNAL MEDICINE
Payer: COMMERCIAL

## 2023-11-16 ENCOUNTER — APPOINTMENT (OUTPATIENT)
Dept: GENERAL RADIOLOGY | Facility: CLINIC | Age: 78
DRG: 522 | End: 2023-11-16
Payer: COMMERCIAL

## 2023-11-16 DIAGNOSIS — S72.002A CLOSED FRACTURE OF NECK OF LEFT FEMUR, INITIAL ENCOUNTER (H): Primary | ICD-10-CM

## 2023-11-16 DIAGNOSIS — M25.512 LEFT SHOULDER PAIN: ICD-10-CM
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
## 2023-11-16 DIAGNOSIS — K59.00 CONSTIPATION, UNSPECIFIED CONSTIPATION TYPE: ICD-10-CM

## 2023-11-16 DIAGNOSIS — I63.9 CEREBROVASCULAR ACCIDENT (CVA), UNSPECIFIED MECHANISM (H): ICD-10-CM
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
## 2023-11-16 DIAGNOSIS — K21.9 GASTROESOPHAGEAL REFLUX DISEASE WITHOUT ESOPHAGITIS: ICD-10-CM

## 2023-11-16 LAB
ANION GAP SERPL CALCULATED.3IONS-SCNC: 12 MMOL/L (ref 7–15)
BASOPHILS # BLD AUTO: 0 10E3/UL (ref 0–0.2)
BASOPHILS NFR BLD AUTO: 0 %
BUN SERPL-MCNC: 18.6 MG/DL (ref 8–23)
CALCIUM SERPL-MCNC: 8.2 MG/DL (ref 8.8–10.2)
CHLORIDE SERPL-SCNC: 105 MMOL/L (ref 98–107)
CREAT SERPL-MCNC: 1.01 MG/DL (ref 0.67–1.17)
DEPRECATED HCO3 PLAS-SCNC: 21 MMOL/L (ref 22–29)
EGFRCR SERPLBLD CKD-EPI 2021: 75 ML/MIN/1.73M2
EOSINOPHIL # BLD AUTO: 0 10E3/UL (ref 0–0.7)
EOSINOPHIL NFR BLD AUTO: 0 %
ERYTHROCYTE [DISTWIDTH] IN BLOOD BY AUTOMATED COUNT: 12.2 % (ref 10–15)
GLUCOSE SERPL-MCNC: 122 MG/DL (ref 70–99)
HCT VFR BLD AUTO: 41.8 % (ref 40–53)
HGB BLD-MCNC: 14.6 G/DL (ref 13.3–17.7)
IMM GRANULOCYTES # BLD: 0.1 10E3/UL
IMM GRANULOCYTES NFR BLD: 1 %
LYMPHOCYTES # BLD AUTO: 0.5 10E3/UL (ref 0.8–5.3)
LYMPHOCYTES NFR BLD AUTO: 5 %
MAGNESIUM SERPL-MCNC: 1.9 MG/DL (ref 1.7–2.3)
MCH RBC QN AUTO: 35.8 PG (ref 26.5–33)
MCHC RBC AUTO-ENTMCNC: 34.9 G/DL (ref 31.5–36.5)
MCV RBC AUTO: 103 FL (ref 78–100)
MONOCYTES # BLD AUTO: 0.5 10E3/UL (ref 0–1.3)
MONOCYTES NFR BLD AUTO: 5 %
NEUTROPHILS # BLD AUTO: 9.4 10E3/UL (ref 1.6–8.3)
NEUTROPHILS NFR BLD AUTO: 89 %
NRBC # BLD AUTO: 0 10E3/UL
NRBC BLD AUTO-RTO: 0 /100
PLATELET # BLD AUTO: 195 10E3/UL (ref 150–450)
POTASSIUM SERPL-SCNC: 4 MMOL/L (ref 3.4–5.3)
RBC # BLD AUTO: 4.08 10E6/UL (ref 4.4–5.9)
SODIUM SERPL-SCNC: 138 MMOL/L (ref 135–145)
WBC # BLD AUTO: 10.5 10E3/UL (ref 4–11)

## 2023-11-16 PROCEDURE — 73502 X-RAY EXAM HIP UNI 2-3 VIEWS: CPT

## 2023-11-16 PROCEDURE — 85025 COMPLETE CBC W/AUTO DIFF WBC: CPT | Performed by: EMERGENCY MEDICINE

## 2023-11-16 PROCEDURE — 93005 ELECTROCARDIOGRAM TRACING: CPT

## 2023-11-16 PROCEDURE — 99223 1ST HOSP IP/OBS HIGH 75: CPT | Performed by: NURSE PRACTITIONER

## 2023-11-16 PROCEDURE — 250N000011 HC RX IP 250 OP 636: Mod: JZ

## 2023-11-16 PROCEDURE — 250N000011 HC RX IP 250 OP 636

## 2023-11-16 PROCEDURE — 99418 PROLNG IP/OBS E/M EA 15 MIN: CPT | Performed by: NURSE PRACTITIONER

## 2023-11-16 PROCEDURE — 73030 X-RAY EXAM OF SHOULDER: CPT | Mod: LT

## 2023-11-16 PROCEDURE — 258N000003 HC RX IP 258 OP 636: Performed by: NURSE PRACTITIONER

## 2023-11-16 PROCEDURE — 120N000001 HC R&B MED SURG/OB

## 2023-11-16 PROCEDURE — 96375 TX/PRO/DX INJ NEW DRUG ADDON: CPT

## 2023-11-16 PROCEDURE — 96374 THER/PROPH/DIAG INJ IV PUSH: CPT

## 2023-11-16 PROCEDURE — 83735 ASSAY OF MAGNESIUM: CPT | Performed by: NURSE PRACTITIONER

## 2023-11-16 PROCEDURE — 250N000013 HC RX MED GY IP 250 OP 250 PS 637: Performed by: NURSE PRACTITIONER

## 2023-11-16 PROCEDURE — 99285 EMERGENCY DEPT VISIT HI MDM: CPT | Mod: 25

## 2023-11-16 PROCEDURE — 80048 BASIC METABOLIC PNL TOTAL CA: CPT | Performed by: EMERGENCY MEDICINE

## 2023-11-16 PROCEDURE — 36415 COLL VENOUS BLD VENIPUNCTURE: CPT | Performed by: EMERGENCY MEDICINE

## 2023-11-16 RX ORDER — CALCIUM CARBONATE 500 MG/1
1000 TABLET, CHEWABLE ORAL 4 TIMES DAILY PRN
Status: DISCONTINUED | OUTPATIENT
Start: 2023-11-16 | End: 2023-11-21 | Stop reason: HOSPADM

## 2023-11-16 RX ORDER — PROCHLORPERAZINE MALEATE 5 MG
5 TABLET ORAL EVERY 6 HOURS PRN
Status: DISCONTINUED | OUTPATIENT
Start: 2023-11-16 | End: 2023-11-17

## 2023-11-16 RX ORDER — NALOXONE HYDROCHLORIDE 0.4 MG/ML
0.4 INJECTION, SOLUTION INTRAMUSCULAR; INTRAVENOUS; SUBCUTANEOUS
Status: DISCONTINUED | OUTPATIENT
Start: 2023-11-16 | End: 2023-11-21 | Stop reason: HOSPADM

## 2023-11-16 RX ORDER — HYDROMORPHONE HYDROCHLORIDE 1 MG/ML
0.5 INJECTION, SOLUTION INTRAMUSCULAR; INTRAVENOUS; SUBCUTANEOUS
Status: DISCONTINUED | OUTPATIENT
Start: 2023-11-16 | End: 2023-11-16

## 2023-11-16 RX ORDER — ONDANSETRON 2 MG/ML
4 INJECTION INTRAMUSCULAR; INTRAVENOUS ONCE
Status: COMPLETED | OUTPATIENT
Start: 2023-11-16 | End: 2023-11-16

## 2023-11-16 RX ORDER — CLOPIDOGREL BISULFATE 75 MG/1
75 TABLET ORAL DAILY
Status: DISCONTINUED | OUTPATIENT
Start: 2023-11-17 | End: 2023-11-21 | Stop reason: HOSPADM

## 2023-11-16 RX ORDER — HYDROMORPHONE HCL IN WATER/PF 6 MG/30 ML
0.4 PATIENT CONTROLLED ANALGESIA SYRINGE INTRAVENOUS
Status: DISCONTINUED | OUTPATIENT
Start: 2023-11-16 | End: 2023-11-17

## 2023-11-16 RX ORDER — PROCHLORPERAZINE 25 MG
12.5 SUPPOSITORY, RECTAL RECTAL EVERY 12 HOURS PRN
Status: DISCONTINUED | OUTPATIENT
Start: 2023-11-16 | End: 2023-11-17

## 2023-11-16 RX ORDER — ONDANSETRON 2 MG/ML
4 INJECTION INTRAMUSCULAR; INTRAVENOUS EVERY 6 HOURS PRN
Status: DISCONTINUED | OUTPATIENT
Start: 2023-11-16 | End: 2023-11-17

## 2023-11-16 RX ORDER — ONDANSETRON 2 MG/ML
INJECTION INTRAMUSCULAR; INTRAVENOUS
Status: COMPLETED
Start: 2023-11-16 | End: 2023-11-16

## 2023-11-16 RX ORDER — ACETAMINOPHEN 325 MG/1
975 TABLET ORAL 3 TIMES DAILY
Status: DISCONTINUED | OUTPATIENT
Start: 2023-11-16 | End: 2023-11-17

## 2023-11-16 RX ORDER — NALOXONE HYDROCHLORIDE 0.4 MG/ML
0.2 INJECTION, SOLUTION INTRAMUSCULAR; INTRAVENOUS; SUBCUTANEOUS
Status: DISCONTINUED | OUTPATIENT
Start: 2023-11-16 | End: 2023-11-21 | Stop reason: HOSPADM

## 2023-11-16 RX ORDER — METHOCARBAMOL 500 MG/1
500 TABLET, FILM COATED ORAL EVERY 6 HOURS PRN
Status: DISCONTINUED | OUTPATIENT
Start: 2023-11-16 | End: 2023-11-17

## 2023-11-16 RX ORDER — ATORVASTATIN CALCIUM 20 MG/1
20 TABLET, FILM COATED ORAL DAILY
Status: DISCONTINUED | OUTPATIENT
Start: 2023-11-17 | End: 2023-11-21 | Stop reason: HOSPADM

## 2023-11-16 RX ORDER — AMOXICILLIN 250 MG
2 CAPSULE ORAL 2 TIMES DAILY PRN
Status: DISCONTINUED | OUTPATIENT
Start: 2023-11-16 | End: 2023-11-21 | Stop reason: HOSPADM

## 2023-11-16 RX ORDER — AMOXICILLIN 250 MG
1 CAPSULE ORAL 2 TIMES DAILY PRN
Status: DISCONTINUED | OUTPATIENT
Start: 2023-11-16 | End: 2023-11-21 | Stop reason: HOSPADM

## 2023-11-16 RX ORDER — IPRATROPIUM BROMIDE AND ALBUTEROL SULFATE 2.5; .5 MG/3ML; MG/3ML
SOLUTION RESPIRATORY (INHALATION)
Status: DISCONTINUED
Start: 2023-11-16 | End: 2023-11-17 | Stop reason: HOSPADM

## 2023-11-16 RX ORDER — POLYETHYLENE GLYCOL 3350 17 G/17G
17 POWDER, FOR SOLUTION ORAL DAILY
Status: DISCONTINUED | OUTPATIENT
Start: 2023-11-16 | End: 2023-11-17

## 2023-11-16 RX ORDER — OXYCODONE HYDROCHLORIDE 5 MG/1
5 TABLET ORAL EVERY 4 HOURS PRN
Status: DISCONTINUED | OUTPATIENT
Start: 2023-11-16 | End: 2023-11-17

## 2023-11-16 RX ORDER — HYDRALAZINE HYDROCHLORIDE 10 MG/1
10 TABLET, FILM COATED ORAL EVERY 4 HOURS PRN
Status: DISCONTINUED | OUTPATIENT
Start: 2023-11-16 | End: 2023-11-21 | Stop reason: HOSPADM

## 2023-11-16 RX ORDER — LISINOPRIL 5 MG/1
5 TABLET ORAL DAILY
COMMUNITY
Start: 2023-10-14

## 2023-11-16 RX ORDER — OXYCODONE HYDROCHLORIDE 10 MG/1
10 TABLET ORAL EVERY 4 HOURS PRN
Status: DISCONTINUED | OUTPATIENT
Start: 2023-11-16 | End: 2023-11-17

## 2023-11-16 RX ORDER — SODIUM CHLORIDE, SODIUM LACTATE, POTASSIUM CHLORIDE, CALCIUM CHLORIDE 600; 310; 30; 20 MG/100ML; MG/100ML; MG/100ML; MG/100ML
INJECTION, SOLUTION INTRAVENOUS CONTINUOUS
Status: DISCONTINUED | OUTPATIENT
Start: 2023-11-16 | End: 2023-11-17

## 2023-11-16 RX ORDER — ONDANSETRON 4 MG/1
4 TABLET, ORALLY DISINTEGRATING ORAL EVERY 6 HOURS PRN
Status: DISCONTINUED | OUTPATIENT
Start: 2023-11-16 | End: 2023-11-17

## 2023-11-16 RX ORDER — HYDRALAZINE HYDROCHLORIDE 20 MG/ML
10 INJECTION INTRAMUSCULAR; INTRAVENOUS EVERY 4 HOURS PRN
Status: DISCONTINUED | OUTPATIENT
Start: 2023-11-16 | End: 2023-11-21 | Stop reason: HOSPADM

## 2023-11-16 RX ORDER — HYDROMORPHONE HCL IN WATER/PF 6 MG/30 ML
0.2 PATIENT CONTROLLED ANALGESIA SYRINGE INTRAVENOUS
Status: DISCONTINUED | OUTPATIENT
Start: 2023-11-16 | End: 2023-11-17

## 2023-11-16 RX ORDER — HYDRALAZINE HYDROCHLORIDE 20 MG/ML
10 INJECTION INTRAMUSCULAR; INTRAVENOUS EVERY 6 HOURS PRN
Status: DISCONTINUED | OUTPATIENT
Start: 2023-11-16 | End: 2023-11-16

## 2023-11-16 RX ADMIN — SODIUM CHLORIDE, POTASSIUM CHLORIDE, SODIUM LACTATE AND CALCIUM CHLORIDE: 600; 310; 30; 20 INJECTION, SOLUTION INTRAVENOUS at 20:54

## 2023-11-16 RX ADMIN — HYDROMORPHONE HYDROCHLORIDE 0.5 MG: 1 INJECTION, SOLUTION INTRAMUSCULAR; INTRAVENOUS; SUBCUTANEOUS at 16:06

## 2023-11-16 RX ADMIN — ONDANSETRON 4 MG: 2 INJECTION INTRAMUSCULAR; INTRAVENOUS at 17:22

## 2023-11-16 RX ADMIN — ACETAMINOPHEN 975 MG: 325 TABLET, FILM COATED ORAL at 20:49

## 2023-11-16 ASSESSMENT — ACTIVITIES OF DAILY LIVING (ADL)
ADLS_ACUITY_SCORE: 35
ADLS_ACUITY_SCORE: 35
ADLS_ACUITY_SCORE: 43
ADLS_ACUITY_SCORE: 43

## 2023-11-16 NOTE — ED PROVIDER NOTES
"ED ATTENDING PHYSICIAN NOTE:   I evaluated this patient in conjunction with Yaima Nixon PA-C  I have participated in the care of the patient and personally performed key elements of the history, exam, and medical decision making.      HPI:   John Riley is a 78 year old male who is on Plavix for history of CVA presenting today for evaluation of a mechanical fall.  He was playing table tennis and his partner stepped on his foot, he fell landing on the left side of his body with his shoulder and hip taking most of the impact.  He did not hit his head nor lose consciousness.  He denies neck pain or back pain.  He is reporting pain and difficulty with range of motion of the left shoulder as well as pain and difficulty with range of motion in the left hip and thigh.  There is a skin tear to the left elbow.  He denies history of injuries or joint replacements to these areas.  He does not have any vision changes, headache, neck pain, chest pain, back pain.        Independent Historian:   None - Patient Only    Review of External Notes: Outpatient clinic notes reviewed; no new information pertinent to today's evaluation     Medications:    Ascorbic Acid (VITAMIN C PO)  atorvastatin (LIPITOR) 20 MG tablet  clopidogrel (PLAVIX) 75 MG tablet  Cyanocobalamin (VITAMIN B-12 PO)  lisinopril (ZESTRIL) 5 MG tablet  multivitamin, therapeutic with minerals (THERA-VIT-M) TABS tablet  Vitamin D3 (CHOLECALCIFEROL) 25 mcg (1000 units) tablet        Past Medical History:         Past Medical History:   Diagnosis Date    Basal cell carcinoma     CVA 2016    EXAM:   /77 (BP Location: Right arm)   Pulse 77   Temp 97.1  F (36.2  C) (Oral)   Resp 20   Ht 1.803 m (5' 11\")   Wt 91.6 kg (202 lb)   SpO2 96%   BMI 28.17 kg/m    General: Elderly male sitting upright  Eyes: PERRL, Conjunctive within normal limits  ENT: Moist mucous membranes, oropharynx clear.   Neck: Nontender.  Normal active range of motion.  CV: Normal S1S2, no " murmur, rub or gallop. Regular rate and rhythm  Resp: Clear to auscultation bilaterally, no wheezes, rales or rhonchi. Normal respiratory effort.  GI: Abdomen is soft, nontender and nondistended. No palpable masses. No rebound or guarding.  MSK: Tender over the left lateral hip.  Unable to range at the left hip.  Able to range fully at the right lower extremity.  Tender over the anterior left shoulder without palpable crepitus or bony deformity.  Unable to actively range the left shoulder due to pain.  Remainder of the extremity is nontender to palpation.  No edema.  Skin: Warm and dry.   Neuro: Alert and oriented. Responds appropriately to all questions and commands. No focal findings appreciated. Normal muscle tone.  Sensation intact to light touch over all dermatomes of the left lower extremity and left upper extremity.  Psych: Propria mood and affect.    Independent Interpretation (X-rays, CTs, rhythm strip):  I reviewed the patient's x-rays.  Femoral neck fracture appreciated.  No fracture appreciated left shoulder.    Consultations/Discussion of Management or Tests:  I discussed the patient with the PAYaima, who also evaluated the patient and is admitting the patient to the hospitalist.  She also spoke with orthopedics, please see her note for details.    Social Determinants of Health affecting care:   None     MEDICAL DECISION MAKING/ASSESSMENT AND PLAN:   John Riley is a 78-year-old male on Plavix who presents emergency department with mechanical fall resulting in left hip and left shoulder pain.  There is no head injury.  He is neurologically intact.  No vascular compromise appreciated.  Imaging consistent with left femoral neck fracture.  X-ray did not show evidence of fracture of the shoulder.  May benefit from further imaging if this continues to be bother to him.  He will be admitted for surgical repair of the femoral neck fracture and ongoing pain control.  All questions were answered prior to  admission.     DIAGNOSIS:     ICD-10-CM    1. Closed fracture of neck of left femur, initial encounter (H)  S72.002A Case Request: ARTHROPLASTY, HIP, TOTAL, DIRECT ANTERIOR APPROACH     Case Request: ARTHROPLASTY, HIP, TOTAL, DIRECT ANTERIOR APPROACH      2. Left shoulder pain  M25.512       3. Fall, initial encounter  W19.XXXA                DISPOSITION: Admitted to the hospitalist service in stable condition with orthopedic consult.     11/16/2023  [facility] EMERGENCY DEPT       Elaina Ayoub MD  11/17/23 0004

## 2023-11-16 NOTE — ED PROVIDER NOTES
History     Chief Complaint:  Fall       HPI   John Riley is a 78 year old male who is on Plavix for history of CVA presenting today for evaluation of a mechanical fall.  He was playing table tennis and his partner stepped on his foot, he fell landing on the left side of his body with his shoulder and hip taking most of the impact.  He did not hit his head nor lose consciousness.  He was unable to get up off the ground, thus EMS was called and he presents here.  He is reporting pain and difficulty with range of motion of the left shoulder as well as pain and difficulty with range of motion in the left hip and thigh.  There is a skin tear to the left elbow.  He denies history of injuries or joint replacements to these areas.  He does not have any vision changes, headache, neck pain, chest pain, back pain.     Independent Historian:   None - Patient Only    Review of External Notes:   Reviewed clinical summary through [facility]where -- history of CVA in 2016 on Plavix.     Medications:    Ascorbic Acid (VITAMIN C PO)  atorvastatin (LIPITOR) 20 MG tablet  clopidogrel (PLAVIX) 75 MG tablet  Cyanocobalamin (VITAMIN B-12 PO)  lisinopril (ZESTRIL) 5 MG tablet  multivitamin, therapeutic with minerals (THERA-VIT-M) TABS tablet  Vitamin D3 (CHOLECALCIFEROL) 25 mcg (1000 units) tablet      Past Medical History:    Past Medical History:   Diagnosis Date    Basal cell carcinoma        Past Surgical History:    No past surgical history on file.     Physical Exam   Patient Vitals for the past 24 hrs:   BP Temp Temp src Pulse Resp SpO2 Height Weight   11/16/23 1755 (!) 153/86 -- -- -- -- 91 % -- --   11/16/23 1730 (!) 153/86 -- -- 79 -- 96 % -- --   11/16/23 1722 -- -- -- -- -- 96 % -- --   11/16/23 1707 -- -- -- -- -- 99 % -- --   11/16/23 1652 -- -- -- 72 -- 97 % -- --   11/16/23 1645 (!) 139/118 -- -- -- -- -- -- --   11/16/23 1600 124/85 -- -- 77 -- -- -- --   11/16/23 1530 (!) 145/86 -- -- 64 -- -- -- --  "  11/16/23 1527 (!) 144/112 -- -- 85 -- -- -- --   11/16/23 1526 (!) 144/112 98.7  F (37.1  C) Oral 84 20 99 % 1.803 m (5' 11\") 91.6 kg (202 lb)        Physical Exam  BP (!) 153/86   Pulse 79   Temp 98.7  F (37.1  C) (Oral)   Resp 20   Ht 1.803 m (5' 11\")   Wt 91.6 kg (202 lb)   SpO2 91%   BMI 28.17 kg/m     General: Lying flat on gurney.  Examined in ED 28.  Head: Atraumatic, specifically no raccoon eyes or mora's sign.   EENT: PERRL. EOMI. No hemotympanum. Moist mucus membranes. No tongue abrasions, oral lacerations, or dental injury.   Neck: C collar in place. Trachea midline. No cervical spinous process tenderness.    CV: Regular rate and rhythm. No appreciable murmurs, rubs, or gallops.   Respiratory: Breathing comfortably on room air. Chest expansion is symmetric. No chest wall tenderness. Lungs clear to auscultation bilaterally without wheezes, rhonchi, or rales.   GI: Soft, non-distended. Non-tender abdomen. No rebound, rigidity, or guarding.   Msk:    Upper extremities: Pain with palpation of the anterior aspect of the left shoulder.  No pain with palpation of the humeral shaft or collarbone.  Unable to actively range the shoulder.  Pain with any passive range of motion of the left shoulder.  Skin tear to the left elbow.  No pain with active or passive range of motion of the left elbow.  Wrist is nontender to palpation and able to actively and passively range the wrist without pain.  Thank you   Lower extremities: No tenderness to the knees or ankles bilaterally. Able to fully range lower extremities. Sensation intact to light touch throughout.   Pelvis: Pain with logrolling of the left hip.  Pain with any range of motion of the left hip.  No pain with range of motion of the right hip.   Back: No thoracic or lumbar spinous process tenderness.   Skin: Warm and dry. No rashes.  Neuro: Awake, alert, and conversant. Oriented to person, place, and situation. Speech clear.  Answers questions " appropriately.  Able to recall events of today.  Psych: Appropriate mood and affect.       Emergency Department Course   Imaging:  XR Pelvis w Hip Left 1 View   Final Result   IMPRESSION: Acute left femoral neck fracture, a mid cervical fracture with mild impaction and angulation. The trochanters appear intact. The femoral head articular surface is intact. Left hip joint alignment is normal. Left hip joint spacing is    maintained with minimal degenerative arthritic spurring. Mild-moderate degenerative joint space narrowing and spurring in the right hip. Moderate-advanced degenerative disc and facet changes in the lower lumbar spine. Mild degenerative arthritic changes    in the SI joints.      XR Shoulder Left G/E 3 Views   Final Result   IMPRESSION:    1.  No acute fracture or joint malalignment.   2.  Mild left glenohumeral and acromioclavicular degenerative arthrosis.   3.  Old healed left posterior 8th rib fracture.            Report per radiology    Laboratory:  Labs Ordered and Resulted from Time of ED Arrival to Time of ED Departure   BASIC METABOLIC PANEL - Abnormal       Result Value    Sodium 138      Potassium 4.0      Chloride 105      Carbon Dioxide (CO2) 21 (*)     Anion Gap 12      Urea Nitrogen 18.6      Creatinine 1.01      GFR Estimate 75      Calcium 8.2 (*)     Glucose 122 (*)    CBC WITH PLATELETS AND DIFFERENTIAL - Abnormal    WBC Count 10.5      RBC Count 4.08 (*)     Hemoglobin 14.6      Hematocrit 41.8       (*)     MCH 35.8 (*)     MCHC 34.9      RDW 12.2      Platelet Count 195      % Neutrophils 89      % Lymphocytes 5      % Monocytes 5      % Eosinophils 0      % Basophils 0      % Immature Granulocytes 1      NRBCs per 100 WBC 0      Absolute Neutrophils 9.4 (*)     Absolute Lymphocytes 0.5 (*)     Absolute Monocytes 0.5      Absolute Eosinophils 0.0      Absolute Basophils 0.0      Absolute Immature Granulocytes 0.1      Absolute NRBCs 0.0          Emergency Department Course  & Assessments:  Interventions:  Medications   HYDROmorphone (PF) (DILAUDID) injection 0.5 mg (0.5 mg Intravenous $Given 11/16/23 1606)   hydrALAZINE (APRESOLINE) injection 10 mg (has no administration in time range)   ondansetron (ZOFRAN) injection 4 mg (4 mg Intravenous $Given 11/16/23 1722)        Assessments and Consultations:  Patient was seen in conjunction with attending physician Dr. Ayoub.    1525   I performed my initial evaluation of the patient  ED Course as of 11/16/23 1833   u Nov 16, 2023   1539 C-collar was removed.  C-spine is clinically clear.  He did not hit his head, lose consciousness.  He does not have any midline C-spine tenderness.  He is sober and has not lost consciousness.  He has full range of motion of the neck that is painless.   1700 Patient updated on results and plan.   1735 Spoke with Dr. Ward from Orthopedics.  Plan on having a total hip arthroplasty tomorrow.   1759 Spoke with Leo Gastelum NP regarding this patient.  Accepted patient for admission.       Independent Interpretation (X-rays, CTs, rhythm strip):  X-ray of the left pelvis shows a left femoral neck fracture.  I reviewed the x-ray of the left shoulder which does not show any acute fracture or dislocation.      Social Determinants of Health affecting care:   None    Disposition:  The patient was admitted to the hospital under the care of Dr. Romero and Leo Gastelum NP.     Impression & Plan    Medical Decision Making:  This is a 78-year-old male with history of CVA on Plavix presenting today for evaluation of mechanical fall.  He landed on his left side with his shoulder and hip taking the brunt of the injury.  He was unable to get up from falling.  He did not hit his head nor lose consciousness.  No neck pain he is not intoxicated, so C-spine was clinically cleared.  No concern for intracranial injury.  He had tenderness with any movement of the left lower extremity, but was neurovascularly intact and there is no  evidence of an open fracture on exam.  X-ray of the left shoulder without any acute fracture or dislocation.  X-ray of the pelvis showed a left femoral neck fracture.  I consulted with Dr. Ward from orthopedics and patient will have surgery tomorrow.  In the meantime, pain was controlled here with Dilaudid.  He will be admitted to the hospitalist team with plan for surgery tomorrow.      Diagnosis:    ICD-10-CM    1. Closed fracture of neck of left femur, initial encounter (H)  S72.002A Case Request: ARTHROPLASTY, HIP, TOTAL, DIRECT ANTERIOR APPROACH     Case Request: ARTHROPLASTY, HIP, TOTAL, DIRECT ANTERIOR APPROACH      2. Left shoulder pain  M25.512       3. Fall, initial encounter  W19.XXXA          Yaima Nixon PA-C  November 16, 2023   [facility]           Yaima Nixon PA-C  11/16/23 1833

## 2023-11-16 NOTE — PHARMACY-ADMISSION MEDICATION HISTORY
Pharmacist Admission Medication History    Admission medication history is complete. The information provided in this note is only as accurate as the sources available at the time of the update.    Information Source(s): Patient and CareEverywhere/SureScripts via in-person    Pertinent Information: None    Changes made to PTA medication list:  Added: None  Deleted: calcium, turmeric, Stress tab  Changed: lisinopril 10mg daily --> 5mg daily      Allergies reviewed with patient and updates made in EHR: no    Medication History Completed By: SUSAN KNAPP [facility] 11/16/2023 5:29 PM    PTA Med List   Medication Sig Last Dose    Ascorbic Acid (VITAMIN C PO) Take 1,000 mg by mouth daily 11/15/2023 at unknown time    atorvastatin (LIPITOR) 20 MG tablet Take 1 tablet (20 mg) by mouth daily 11/16/2023 at AM    clopidogrel (PLAVIX) 75 MG tablet Take 1 tablet (75 mg) by mouth daily 11/16/2023 at AM    Cyanocobalamin (VITAMIN B-12 PO) Take 1 tablet by mouth daily 11/15/2023 at unknown time    lisinopril (ZESTRIL) 5 MG tablet Take 5 mg by mouth daily 11/16/2023 at AM    multivitamin, therapeutic with minerals (THERA-VIT-M) TABS tablet Take 1 tablet by mouth daily 11/15/2023 at unknown time    Vitamin D3 (CHOLECALCIFEROL) 25 mcg (1000 units) tablet Take 1,000 Units by mouth daily 11/15/2023 at unknown time

## 2023-11-16 NOTE — ED TRIAGE NOTES
Pt arrives by EMS. Was playing table tennis at home, another friend stepped on his foot, and he fell wrong landing onto his left hip/shoulder. Was unable to get off the floor and had to have help. Now have left shoulder pain and hip/leg pain. Unable to raise leg/shoulder. Abrasion to left elbow  Hx of stroke 6 years ago with no issues per pt.       Hypertensive for EMS       Triage Assessment (Adult)       Row Name 11/16/23 1516          Triage Assessment    Airway WDL WDL        Respiratory WDL    Respiratory WDL WDL        Skin Circulation/Temperature WDL    Skin Circulation/Temperature WDL WDL        Cardiac WDL    Cardiac WDL WDL        Peripheral/Neurovascular WDL    Peripheral Neurovascular WDL WDL        Cognitive/Neuro/Behavioral WDL    Cognitive/Neuro/Behavioral WDL WDL                      No

## 2023-11-16 NOTE — ED NOTES
"[facility]  ED Nurse Handoff Report    ED Chief complaint: Fall  . ED Diagnosis:   Final diagnoses:   None       Allergies: No Known Allergies    Code Status: Full Code    Activity level - Baseline/Home:  independent.  Activity Level - Current:   in bed.   Lift room needed: No.   Bariatric: No   Needed: No   Isolation: No.   Infection: Not Applicable  .     Respiratory status: Room air    Vital Signs (within 30 minutes):   Vitals:    11/16/23 1526 11/16/23 1527 11/16/23 1530 11/16/23 1600   BP: (!) 144/112 (!) 144/112 (!) 145/86 124/85   Pulse: 84 85 64 77   Resp: 20      Temp: 98.7  F (37.1  C)      TempSrc: Oral      SpO2: 99%      Weight: 91.6 kg (202 lb)      Height: 1.803 m (5' 11\")          Cardiac Rhythm:  ,      Pain level:    Patient confused: No.   Patient Falls Risk: bed/chair alarm on, activity supervised, and mobility aid in reach.   Elimination Status:         Patient Report - Initial Complaint: Pt comes from home where he was playing table tennis. Pt stepped wrong, and another person stepped onto his foot. He was unable to catch his footing and he fell onto his left side with left shoulder and hip pain.  Focused Assessment: See note.      Abnormal Results:   Labs Ordered and Resulted from Time of ED Arrival to Time of ED Departure - No data to display     XR Pelvis w Hip Left 1 View   Final Result   IMPRESSION: Acute left femoral neck fracture, a mid cervical fracture with mild impaction and angulation. The trochanters appear intact. The femoral head articular surface is intact. Left hip joint alignment is normal. Left hip joint spacing is    maintained with minimal degenerative arthritic spurring. Mild-moderate degenerative joint space narrowing and spurring in the right hip. Moderate-advanced degenerative disc and facet changes in the lower lumbar spine. Mild degenerative arthritic changes    in the SI joints.      XR Shoulder Left G/E 3 Views   Final Result "   IMPRESSION:    1.  No acute fracture or joint malalignment.   2.  Mild left glenohumeral and acromioclavicular degenerative arthrosis.   3.  Old healed left posterior 8th rib fracture.             Treatments provided: IV, labs, pain meds, xr  Family Comments: Pt called   OBS brochure/video discussed/provided to patient:  N/A  ED Medications:   Medications   HYDROmorphone (PF) (DILAUDID) injection 0.5 mg (0.5 mg Intravenous $Given 11/16/23 1606)       Drips infusing:  No  For the majority of the shift this patient was Green.   Interventions performed were Pain meds. XR.    Sepsis treatment initiated: No    Cares/treatment/interventions/medications to be completed following ED care: Per inpatient    ED Nurse Name: Phuong Gordon RN  4:58 PM  RECEIVING UNIT ED HANDOFF REVIEW    Above ED Nurse Handoff Report was reviewed: Yes  Reviewed by: Bria Hewitt RN on November 16, 2023 at 6:45 PM

## 2023-11-16 NOTE — PROGRESS NOTES
Ortho aware of consult.  Left FNF due to ground level fall playing ping pong.  Plays ping pong in a league several times weekly.    Plan:  - Admit to medicine  - NPO p MN  - To OR tomorrow for L HELGA with Dr. Estrada

## 2023-11-17 ENCOUNTER — ANESTHESIA (OUTPATIENT)
Dept: SURGERY | Facility: CLINIC | Age: 78
DRG: 522 | End: 2023-11-17
Payer: COMMERCIAL

## 2023-11-17 ENCOUNTER — APPOINTMENT (OUTPATIENT)
Dept: GENERAL RADIOLOGY | Facility: CLINIC | Age: 78
DRG: 522 | End: 2023-11-17
Attending: PHYSICIAN ASSISTANT
Payer: COMMERCIAL

## 2023-11-17 ENCOUNTER — APPOINTMENT (OUTPATIENT)
Dept: GENERAL RADIOLOGY | Facility: CLINIC | Age: 78
DRG: 522 | End: 2023-11-17
Attending: INTERNAL MEDICINE
Payer: COMMERCIAL

## 2023-11-17 ENCOUNTER — ANESTHESIA EVENT (OUTPATIENT)
Dept: SURGERY | Facility: CLINIC | Age: 78
DRG: 522 | End: 2023-11-17
Payer: COMMERCIAL

## 2023-11-17 LAB
ABO/RH(D): NORMAL
ANION GAP SERPL CALCULATED.3IONS-SCNC: 11 MMOL/L (ref 7–15)
ANTIBODY SCREEN: NEGATIVE
BUN SERPL-MCNC: 21 MG/DL (ref 8–23)
CALCIUM SERPL-MCNC: 8.6 MG/DL (ref 8.8–10.2)
CHLORIDE SERPL-SCNC: 106 MMOL/L (ref 98–107)
CREAT SERPL-MCNC: 1.03 MG/DL (ref 0.67–1.17)
DEPRECATED HCO3 PLAS-SCNC: 24 MMOL/L (ref 22–29)
EGFRCR SERPLBLD CKD-EPI 2021: 73 ML/MIN/1.73M2
ERYTHROCYTE [DISTWIDTH] IN BLOOD BY AUTOMATED COUNT: 12.3 % (ref 10–15)
GLUCOSE SERPL-MCNC: 127 MG/DL (ref 70–99)
HCT VFR BLD AUTO: 42.8 % (ref 40–53)
HGB BLD-MCNC: 14.7 G/DL (ref 13.3–17.7)
INR PPP: 0.91 (ref 0.85–1.15)
MAGNESIUM SERPL-MCNC: 1.9 MG/DL (ref 1.7–2.3)
MCH RBC QN AUTO: 35.5 PG (ref 26.5–33)
MCHC RBC AUTO-ENTMCNC: 34.3 G/DL (ref 31.5–36.5)
MCV RBC AUTO: 103 FL (ref 78–100)
PLATELET # BLD AUTO: 196 10E3/UL (ref 150–450)
POTASSIUM SERPL-SCNC: 4.9 MMOL/L (ref 3.4–5.3)
RBC # BLD AUTO: 4.14 10E6/UL (ref 4.4–5.9)
SODIUM SERPL-SCNC: 141 MMOL/L (ref 135–145)
SPECIMEN EXPIRATION DATE: NORMAL
WBC # BLD AUTO: 10.5 10E3/UL (ref 4–11)

## 2023-11-17 PROCEDURE — 250N000011 HC RX IP 250 OP 636: Mod: JZ | Performed by: ANESTHESIOLOGY

## 2023-11-17 PROCEDURE — 258N000001 HC RX 258: Performed by: ORTHOPAEDIC SURGERY

## 2023-11-17 PROCEDURE — 272N000001 HC OR GENERAL SUPPLY STERILE: Performed by: ORTHOPAEDIC SURGERY

## 2023-11-17 PROCEDURE — 250N000009 HC RX 250: Performed by: ORTHOPAEDIC SURGERY

## 2023-11-17 PROCEDURE — 250N000011 HC RX IP 250 OP 636: Mod: JZ | Performed by: NURSE PRACTITIONER

## 2023-11-17 PROCEDURE — 250N000011 HC RX IP 250 OP 636: Mod: JZ | Performed by: NURSE ANESTHETIST, CERTIFIED REGISTERED

## 2023-11-17 PROCEDURE — 360N000077 HC SURGERY LEVEL 4, PER MIN: Performed by: ORTHOPAEDIC SURGERY

## 2023-11-17 PROCEDURE — 250N000013 HC RX MED GY IP 250 OP 250 PS 637: Performed by: PHYSICIAN ASSISTANT

## 2023-11-17 PROCEDURE — 250N000011 HC RX IP 250 OP 636: Mod: JZ | Performed by: ORTHOPAEDIC SURGERY

## 2023-11-17 PROCEDURE — 250N000011 HC RX IP 250 OP 636: Mod: JZ | Performed by: PHYSICIAN ASSISTANT

## 2023-11-17 PROCEDURE — 99232 SBSQ HOSP IP/OBS MODERATE 35: CPT | Performed by: STUDENT IN AN ORGANIZED HEALTH CARE EDUCATION/TRAINING PROGRAM

## 2023-11-17 PROCEDURE — 88300 SURGICAL PATH GROSS: CPT | Mod: TC | Performed by: ORTHOPAEDIC SURGERY

## 2023-11-17 PROCEDURE — 258N000003 HC RX IP 258 OP 636: Performed by: ANESTHESIOLOGY

## 2023-11-17 PROCEDURE — 250N000025 HC SEVOFLURANE, PER MIN: Performed by: ORTHOPAEDIC SURGERY

## 2023-11-17 PROCEDURE — C1713 ANCHOR/SCREW BN/BN,TIS/BN: HCPCS | Performed by: ORTHOPAEDIC SURGERY

## 2023-11-17 PROCEDURE — 250N000011 HC RX IP 250 OP 636: Performed by: ORTHOPAEDIC SURGERY

## 2023-11-17 PROCEDURE — 258N000003 HC RX IP 258 OP 636: Performed by: ORTHOPAEDIC SURGERY

## 2023-11-17 PROCEDURE — 710N000009 HC RECOVERY PHASE 1, LEVEL 1, PER MIN: Performed by: ORTHOPAEDIC SURGERY

## 2023-11-17 PROCEDURE — 258N000003 HC RX IP 258 OP 636: Performed by: NURSE ANESTHETIST, CERTIFIED REGISTERED

## 2023-11-17 PROCEDURE — 85027 COMPLETE CBC AUTOMATED: CPT | Performed by: NURSE PRACTITIONER

## 2023-11-17 PROCEDURE — 0SRB049 REPLACEMENT OF LEFT HIP JOINT WITH CERAMIC ON POLYETHYLENE SYNTHETIC SUBSTITUTE, CEMENTED, OPEN APPROACH: ICD-10-PCS | Performed by: ORTHOPAEDIC SURGERY

## 2023-11-17 PROCEDURE — 250N000013 HC RX MED GY IP 250 OP 250 PS 637: Performed by: ANESTHESIOLOGY

## 2023-11-17 PROCEDURE — 85610 PROTHROMBIN TIME: CPT | Performed by: NURSE PRACTITIONER

## 2023-11-17 PROCEDURE — 83735 ASSAY OF MAGNESIUM: CPT | Performed by: STUDENT IN AN ORGANIZED HEALTH CARE EDUCATION/TRAINING PROGRAM

## 2023-11-17 PROCEDURE — 250N000013 HC RX MED GY IP 250 OP 250 PS 637: Performed by: NURSE PRACTITIONER

## 2023-11-17 PROCEDURE — 88300 SURGICAL PATH GROSS: CPT | Mod: 26 | Performed by: PATHOLOGY

## 2023-11-17 PROCEDURE — 370N000017 HC ANESTHESIA TECHNICAL FEE, PER MIN: Performed by: ORTHOPAEDIC SURGERY

## 2023-11-17 PROCEDURE — 86901 BLOOD TYPING SEROLOGIC RH(D): CPT | Performed by: NURSE PRACTITIONER

## 2023-11-17 PROCEDURE — 999N000065 XR PELVIS AND HIP LEFT 1 VIEW

## 2023-11-17 PROCEDURE — 999N000141 HC STATISTIC PRE-PROCEDURE NURSING ASSESSMENT: Performed by: ORTHOPAEDIC SURGERY

## 2023-11-17 PROCEDURE — 120N000001 HC R&B MED SURG/OB

## 2023-11-17 PROCEDURE — 999N000063 XR HIP PORT LEFT 1 VIEW: Mod: TC

## 2023-11-17 PROCEDURE — 250N000009 HC RX 250: Performed by: ANESTHESIOLOGY

## 2023-11-17 PROCEDURE — 36415 COLL VENOUS BLD VENIPUNCTURE: CPT | Performed by: NURSE PRACTITIONER

## 2023-11-17 PROCEDURE — 80048 BASIC METABOLIC PNL TOTAL CA: CPT | Performed by: NURSE PRACTITIONER

## 2023-11-17 PROCEDURE — 86850 RBC ANTIBODY SCREEN: CPT | Performed by: NURSE PRACTITIONER

## 2023-11-17 PROCEDURE — C1776 JOINT DEVICE (IMPLANTABLE): HCPCS | Performed by: ORTHOPAEDIC SURGERY

## 2023-11-17 PROCEDURE — 250N000009 HC RX 250: Performed by: NURSE ANESTHETIST, CERTIFIED REGISTERED

## 2023-11-17 DEVICE — PINNACLE HIP SOLUTIONS ALTRX POLYETHYLENE ACETABULAR LINER NEUTRAL 36MM ID 56MM OD
Type: IMPLANTABLE DEVICE | Site: HIP | Status: FUNCTIONAL
Brand: PINNACLE ALTRX

## 2023-11-17 DEVICE — PINNACLE CANCELLOUS BONE SCREW 6.5MM X 35MM
Type: IMPLANTABLE DEVICE | Site: HIP | Status: FUNCTIONAL
Brand: PINNACLE

## 2023-11-17 DEVICE — PINNACLE CANCELLOUS BONE SCREW 6.5MM X 25MM
Type: IMPLANTABLE DEVICE | Site: HIP | Status: FUNCTIONAL
Brand: PINNACLE

## 2023-11-17 DEVICE — APEX HOLE ELIMINATOR - PS
Type: IMPLANTABLE DEVICE | Site: HIP | Status: FUNCTIONAL
Brand: APEX

## 2023-11-17 DEVICE — BUCK FEMORAL CEMENT RESTRICTOR 18.5MM
Type: IMPLANTABLE DEVICE | Site: HIP | Status: FUNCTIONAL
Brand: BUCK

## 2023-11-17 DEVICE — FULL DOSE BONE CEMENT, 10 PACK CATALOG NUMBER IS 6191-1-010
Type: IMPLANTABLE DEVICE | Site: HIP | Status: FUNCTIONAL
Brand: SIMPLEX

## 2023-11-17 DEVICE — IMP HEAD FEMORAL DEPUY CERAMIC 36MM +5MM 136536320: Type: IMPLANTABLE DEVICE | Site: HIP | Status: FUNCTIONAL

## 2023-11-17 DEVICE — SUMMIT FEMORAL STEM 12/14 TAPER CEMENTED SIZE 6 HI 127MM
Type: IMPLANTABLE DEVICE | Site: HIP | Status: FUNCTIONAL
Brand: SUMMIT

## 2023-11-17 DEVICE — PINNACLE GRIPTION ACETABULAR SHELL SECTOR 56MM OD
Type: IMPLANTABLE DEVICE | Site: HIP | Status: FUNCTIONAL
Brand: PINNACLE GRIPTION

## 2023-11-17 DEVICE — CEMENTRALIZER STEM CENTRALIZER 11.0MM CEMENTED
Type: IMPLANTABLE DEVICE | Site: HIP | Status: FUNCTIONAL
Brand: CEMENTRALIZER

## 2023-11-17 RX ORDER — ALBUTEROL SULFATE 0.83 MG/ML
2.5 SOLUTION RESPIRATORY (INHALATION) EVERY 4 HOURS PRN
Status: DISCONTINUED | OUTPATIENT
Start: 2023-11-17 | End: 2023-11-17 | Stop reason: HOSPADM

## 2023-11-17 RX ORDER — BISACODYL 10 MG
10 SUPPOSITORY, RECTAL RECTAL DAILY PRN
Status: DISCONTINUED | OUTPATIENT
Start: 2023-11-17 | End: 2023-11-21 | Stop reason: HOSPADM

## 2023-11-17 RX ORDER — TRANEXAMIC ACID 10 MG/ML
1 INJECTION, SOLUTION INTRAVENOUS ONCE
Status: COMPLETED | OUTPATIENT
Start: 2023-11-17 | End: 2023-11-17

## 2023-11-17 RX ORDER — HYDROMORPHONE HCL IN WATER/PF 6 MG/30 ML
0.4 PATIENT CONTROLLED ANALGESIA SYRINGE INTRAVENOUS
Status: DISCONTINUED | OUTPATIENT
Start: 2023-11-17 | End: 2023-11-21 | Stop reason: HOSPADM

## 2023-11-17 RX ORDER — SODIUM CHLORIDE, SODIUM LACTATE, POTASSIUM CHLORIDE, CALCIUM CHLORIDE 600; 310; 30; 20 MG/100ML; MG/100ML; MG/100ML; MG/100ML
INJECTION, SOLUTION INTRAVENOUS CONTINUOUS
Status: DISCONTINUED | OUTPATIENT
Start: 2023-11-17 | End: 2023-11-17 | Stop reason: HOSPADM

## 2023-11-17 RX ORDER — CEFAZOLIN SODIUM/WATER 2 G/20 ML
2 SYRINGE (ML) INTRAVENOUS
Status: COMPLETED | OUTPATIENT
Start: 2023-11-17 | End: 2023-11-17

## 2023-11-17 RX ORDER — DEXAMETHASONE SODIUM PHOSPHATE 4 MG/ML
INJECTION, SOLUTION INTRA-ARTICULAR; INTRALESIONAL; INTRAMUSCULAR; INTRAVENOUS; SOFT TISSUE PRN
Status: DISCONTINUED | OUTPATIENT
Start: 2023-11-17 | End: 2023-11-17

## 2023-11-17 RX ORDER — LIDOCAINE HYDROCHLORIDE 20 MG/ML
INJECTION, SOLUTION INFILTRATION; PERINEURAL PRN
Status: DISCONTINUED | OUTPATIENT
Start: 2023-11-17 | End: 2023-11-17

## 2023-11-17 RX ORDER — PROPOFOL 10 MG/ML
INJECTION, EMULSION INTRAVENOUS PRN
Status: DISCONTINUED | OUTPATIENT
Start: 2023-11-17 | End: 2023-11-17

## 2023-11-17 RX ORDER — AMOXICILLIN 250 MG
1 CAPSULE ORAL 2 TIMES DAILY
Status: DISCONTINUED | OUTPATIENT
Start: 2023-11-17 | End: 2023-11-21 | Stop reason: HOSPADM

## 2023-11-17 RX ORDER — CEFAZOLIN SODIUM 2 G/100ML
2 INJECTION, SOLUTION INTRAVENOUS EVERY 8 HOURS
Status: COMPLETED | OUTPATIENT
Start: 2023-11-17 | End: 2023-11-17

## 2023-11-17 RX ORDER — PROCHLORPERAZINE MALEATE 5 MG
5 TABLET ORAL EVERY 6 HOURS PRN
Status: DISCONTINUED | OUTPATIENT
Start: 2023-11-17 | End: 2023-11-21 | Stop reason: HOSPADM

## 2023-11-17 RX ORDER — METHADONE HYDROCHLORIDE 10 MG/ML
INJECTION, SOLUTION INTRAMUSCULAR; INTRAVENOUS; SUBCUTANEOUS PRN
Status: DISCONTINUED | OUTPATIENT
Start: 2023-11-17 | End: 2023-11-17

## 2023-11-17 RX ORDER — OXYCODONE HYDROCHLORIDE 10 MG/1
10 TABLET ORAL EVERY 4 HOURS PRN
Status: DISCONTINUED | OUTPATIENT
Start: 2023-11-17 | End: 2023-11-18

## 2023-11-17 RX ORDER — METHOCARBAMOL 500 MG/1
500 TABLET, FILM COATED ORAL EVERY 6 HOURS PRN
Status: DISCONTINUED | OUTPATIENT
Start: 2023-11-17 | End: 2023-11-21 | Stop reason: HOSPADM

## 2023-11-17 RX ORDER — GLYCOPYRROLATE 0.2 MG/ML
INJECTION, SOLUTION INTRAMUSCULAR; INTRAVENOUS PRN
Status: DISCONTINUED | OUTPATIENT
Start: 2023-11-17 | End: 2023-11-17

## 2023-11-17 RX ORDER — LABETALOL HYDROCHLORIDE 5 MG/ML
10 INJECTION, SOLUTION INTRAVENOUS
Status: DISCONTINUED | OUTPATIENT
Start: 2023-11-17 | End: 2023-11-17 | Stop reason: HOSPADM

## 2023-11-17 RX ORDER — DIPHENHYDRAMINE HCL 12.5MG/5ML
12.5 LIQUID (ML) ORAL EVERY 6 HOURS PRN
Status: DISCONTINUED | OUTPATIENT
Start: 2023-11-17 | End: 2023-11-17 | Stop reason: HOSPADM

## 2023-11-17 RX ORDER — HYDROXYZINE HYDROCHLORIDE 10 MG/1
10 TABLET, FILM COATED ORAL EVERY 6 HOURS PRN
Status: DISCONTINUED | OUTPATIENT
Start: 2023-11-17 | End: 2023-11-21 | Stop reason: HOSPADM

## 2023-11-17 RX ORDER — MAGNESIUM SULFATE HEPTAHYDRATE 40 MG/ML
2 INJECTION, SOLUTION INTRAVENOUS
Status: COMPLETED | OUTPATIENT
Start: 2023-11-17 | End: 2023-11-17

## 2023-11-17 RX ORDER — ONDANSETRON 2 MG/ML
4 INJECTION INTRAMUSCULAR; INTRAVENOUS EVERY 30 MIN PRN
Status: DISCONTINUED | OUTPATIENT
Start: 2023-11-17 | End: 2023-11-17 | Stop reason: HOSPADM

## 2023-11-17 RX ORDER — ACETAMINOPHEN 325 MG/1
650 TABLET ORAL EVERY 4 HOURS PRN
Status: DISCONTINUED | OUTPATIENT
Start: 2023-11-20 | End: 2023-11-21 | Stop reason: HOSPADM

## 2023-11-17 RX ORDER — FENTANYL CITRATE 50 UG/ML
INJECTION, SOLUTION INTRAMUSCULAR; INTRAVENOUS PRN
Status: DISCONTINUED | OUTPATIENT
Start: 2023-11-17 | End: 2023-11-17

## 2023-11-17 RX ORDER — OXYCODONE HYDROCHLORIDE 5 MG/1
5 TABLET ORAL EVERY 4 HOURS PRN
Status: DISCONTINUED | OUTPATIENT
Start: 2023-11-17 | End: 2023-11-18

## 2023-11-17 RX ORDER — LIDOCAINE 40 MG/G
CREAM TOPICAL
Status: DISCONTINUED | OUTPATIENT
Start: 2023-11-17 | End: 2023-11-17 | Stop reason: HOSPADM

## 2023-11-17 RX ORDER — ACETAMINOPHEN 325 MG/1
975 TABLET ORAL EVERY 8 HOURS
Qty: 27 TABLET | Refills: 0 | Status: COMPLETED | OUTPATIENT
Start: 2023-11-17 | End: 2023-11-20

## 2023-11-17 RX ORDER — POLYETHYLENE GLYCOL 3350 17 G/17G
17 POWDER, FOR SOLUTION ORAL DAILY
Status: DISCONTINUED | OUTPATIENT
Start: 2023-11-18 | End: 2023-11-21 | Stop reason: HOSPADM

## 2023-11-17 RX ORDER — KETOROLAC TROMETHAMINE 15 MG/ML
15 INJECTION, SOLUTION INTRAMUSCULAR; INTRAVENOUS
Status: DISCONTINUED | OUTPATIENT
Start: 2023-11-17 | End: 2023-11-17 | Stop reason: HOSPADM

## 2023-11-17 RX ORDER — ACETAMINOPHEN 325 MG/1
975 TABLET ORAL ONCE
Status: COMPLETED | OUTPATIENT
Start: 2023-11-17 | End: 2023-11-17

## 2023-11-17 RX ORDER — EPHEDRINE SULFATE 50 MG/ML
INJECTION, SOLUTION INTRAMUSCULAR; INTRAVENOUS; SUBCUTANEOUS PRN
Status: DISCONTINUED | OUTPATIENT
Start: 2023-11-17 | End: 2023-11-17

## 2023-11-17 RX ORDER — CEFAZOLIN SODIUM/WATER 2 G/20 ML
2 SYRINGE (ML) INTRAVENOUS SEE ADMIN INSTRUCTIONS
Status: DISCONTINUED | OUTPATIENT
Start: 2023-11-17 | End: 2023-11-17 | Stop reason: HOSPADM

## 2023-11-17 RX ORDER — LIDOCAINE 40 MG/G
CREAM TOPICAL
Status: DISCONTINUED | OUTPATIENT
Start: 2023-11-17 | End: 2023-11-21 | Stop reason: HOSPADM

## 2023-11-17 RX ORDER — LIDOCAINE HYDROCHLORIDE 40 MG/ML
SOLUTION TOPICAL PRN
Status: DISCONTINUED | OUTPATIENT
Start: 2023-11-17 | End: 2023-11-17

## 2023-11-17 RX ORDER — HYDRALAZINE HYDROCHLORIDE 20 MG/ML
10 INJECTION INTRAMUSCULAR; INTRAVENOUS EVERY 10 MIN PRN
Status: DISCONTINUED | OUTPATIENT
Start: 2023-11-17 | End: 2023-11-17 | Stop reason: HOSPADM

## 2023-11-17 RX ORDER — ONDANSETRON 4 MG/1
4 TABLET, ORALLY DISINTEGRATING ORAL EVERY 30 MIN PRN
Status: DISCONTINUED | OUTPATIENT
Start: 2023-11-17 | End: 2023-11-17 | Stop reason: HOSPADM

## 2023-11-17 RX ORDER — SCOLOPAMINE TRANSDERMAL SYSTEM 1 MG/1
1 PATCH, EXTENDED RELEASE TRANSDERMAL ONCE
Status: COMPLETED | OUTPATIENT
Start: 2023-11-17 | End: 2023-11-18

## 2023-11-17 RX ORDER — ONDANSETRON 2 MG/ML
4 INJECTION INTRAMUSCULAR; INTRAVENOUS EVERY 6 HOURS PRN
Status: DISCONTINUED | OUTPATIENT
Start: 2023-11-17 | End: 2023-11-21 | Stop reason: HOSPADM

## 2023-11-17 RX ORDER — METHADONE HYDROCHLORIDE 10 MG/ML
2 INJECTION, SOLUTION INTRAMUSCULAR; INTRAVENOUS; SUBCUTANEOUS 3 TIMES DAILY PRN
Status: DISCONTINUED | OUTPATIENT
Start: 2023-11-17 | End: 2023-11-17 | Stop reason: HOSPADM

## 2023-11-17 RX ORDER — ONDANSETRON 4 MG/1
4 TABLET, ORALLY DISINTEGRATING ORAL EVERY 6 HOURS PRN
Status: DISCONTINUED | OUTPATIENT
Start: 2023-11-17 | End: 2023-11-21 | Stop reason: HOSPADM

## 2023-11-17 RX ORDER — DIPHENHYDRAMINE HYDROCHLORIDE 50 MG/ML
12.5 INJECTION INTRAMUSCULAR; INTRAVENOUS EVERY 6 HOURS PRN
Status: DISCONTINUED | OUTPATIENT
Start: 2023-11-17 | End: 2023-11-17 | Stop reason: HOSPADM

## 2023-11-17 RX ORDER — ONDANSETRON 2 MG/ML
INJECTION INTRAMUSCULAR; INTRAVENOUS PRN
Status: DISCONTINUED | OUTPATIENT
Start: 2023-11-17 | End: 2023-11-17

## 2023-11-17 RX ORDER — FAMOTIDINE 20 MG/1
20 TABLET, FILM COATED ORAL 2 TIMES DAILY
Status: DISCONTINUED | OUTPATIENT
Start: 2023-11-17 | End: 2023-11-21 | Stop reason: HOSPADM

## 2023-11-17 RX ORDER — HYDROMORPHONE HCL IN WATER/PF 6 MG/30 ML
0.2 PATIENT CONTROLLED ANALGESIA SYRINGE INTRAVENOUS
Status: DISCONTINUED | OUTPATIENT
Start: 2023-11-17 | End: 2023-11-21 | Stop reason: HOSPADM

## 2023-11-17 RX ORDER — KETOROLAC TROMETHAMINE 30 MG/ML
INJECTION, SOLUTION INTRAMUSCULAR; INTRAVENOUS PRN
Status: DISCONTINUED | OUTPATIENT
Start: 2023-11-17 | End: 2023-11-17

## 2023-11-17 RX ADMIN — METHOCARBAMOL 500 MG: 500 TABLET ORAL at 01:30

## 2023-11-17 RX ADMIN — PHENYLEPHRINE HYDROCHLORIDE 100 MCG: 10 INJECTION INTRAVENOUS at 09:38

## 2023-11-17 RX ADMIN — PHENYLEPHRINE HYDROCHLORIDE 100 MCG: 10 INJECTION INTRAVENOUS at 09:22

## 2023-11-17 RX ADMIN — PHENYLEPHRINE HYDROCHLORIDE 100 MCG: 10 INJECTION INTRAVENOUS at 10:13

## 2023-11-17 RX ADMIN — PHENYLEPHRINE HYDROCHLORIDE 100 MCG: 10 INJECTION INTRAVENOUS at 09:45

## 2023-11-17 RX ADMIN — FENTANYL CITRATE 25 MCG: 50 INJECTION INTRAMUSCULAR; INTRAVENOUS at 09:10

## 2023-11-17 RX ADMIN — Medication 5 MG: at 09:56

## 2023-11-17 RX ADMIN — CEFAZOLIN SODIUM 2 G: 2 INJECTION, SOLUTION INTRAVENOUS at 14:37

## 2023-11-17 RX ADMIN — ONDANSETRON 4 MG: 2 INJECTION INTRAMUSCULAR; INTRAVENOUS at 11:20

## 2023-11-17 RX ADMIN — PHENYLEPHRINE HYDROCHLORIDE 100 MCG: 10 INJECTION INTRAVENOUS at 10:01

## 2023-11-17 RX ADMIN — LIDOCAINE HYDROCHLORIDE 4 ML: 40 SOLUTION TOPICAL at 07:42

## 2023-11-17 RX ADMIN — PHENYLEPHRINE HYDROCHLORIDE 100 MCG: 10 INJECTION INTRAVENOUS at 09:32

## 2023-11-17 RX ADMIN — PHENYLEPHRINE HYDROCHLORIDE 100 MCG: 10 INJECTION INTRAVENOUS at 10:07

## 2023-11-17 RX ADMIN — FAMOTIDINE 20 MG: 20 TABLET ORAL at 22:07

## 2023-11-17 RX ADMIN — GLYCOPYRROLATE 0.1 MG: 0.2 INJECTION, SOLUTION INTRAMUSCULAR; INTRAVENOUS at 09:22

## 2023-11-17 RX ADMIN — KETOROLAC TROMETHAMINE 15 MG: 30 INJECTION, SOLUTION INTRAMUSCULAR at 07:39

## 2023-11-17 RX ADMIN — PHENYLEPHRINE HYDROCHLORIDE 100 MCG: 10 INJECTION INTRAVENOUS at 09:41

## 2023-11-17 RX ADMIN — SCOPALAMINE 1 PATCH: 1 PATCH, EXTENDED RELEASE TRANSDERMAL at 06:55

## 2023-11-17 RX ADMIN — TRANEXAMIC ACID 1 G: 10 INJECTION, SOLUTION INTRAVENOUS at 09:53

## 2023-11-17 RX ADMIN — SODIUM CHLORIDE, POTASSIUM CHLORIDE, SODIUM LACTATE AND CALCIUM CHLORIDE: 600; 310; 30; 20 INJECTION, SOLUTION INTRAVENOUS at 09:05

## 2023-11-17 RX ADMIN — TRANEXAMIC ACID 1 G: 10 INJECTION, SOLUTION INTRAVENOUS at 07:44

## 2023-11-17 RX ADMIN — ONDANSETRON 4 MG: 2 INJECTION INTRAMUSCULAR; INTRAVENOUS at 07:39

## 2023-11-17 RX ADMIN — SODIUM CHLORIDE, POTASSIUM CHLORIDE, SODIUM LACTATE AND CALCIUM CHLORIDE: 600; 310; 30; 20 INJECTION, SOLUTION INTRAVENOUS at 07:31

## 2023-11-17 RX ADMIN — HYDROMORPHONE HYDROCHLORIDE 0.2 MG: 0.2 INJECTION, SOLUTION INTRAMUSCULAR; INTRAVENOUS; SUBCUTANEOUS at 01:31

## 2023-11-17 RX ADMIN — DEXAMETHASONE SODIUM PHOSPHATE 8 MG: 4 INJECTION, SOLUTION INTRA-ARTICULAR; INTRALESIONAL; INTRAMUSCULAR; INTRAVENOUS; SOFT TISSUE at 07:39

## 2023-11-17 RX ADMIN — PHENYLEPHRINE HYDROCHLORIDE 100 MCG: 10 INJECTION INTRAVENOUS at 09:50

## 2023-11-17 RX ADMIN — DEXMEDETOMIDINE HYDROCHLORIDE 0.5 MCG/KG/HR: 100 INJECTION, SOLUTION INTRAVENOUS at 07:53

## 2023-11-17 RX ADMIN — LIDOCAINE HYDROCHLORIDE 50 MG: 20 INJECTION, SOLUTION INFILTRATION; PERINEURAL at 07:39

## 2023-11-17 RX ADMIN — ATORVASTATIN CALCIUM 20 MG: 20 TABLET, FILM COATED ORAL at 13:12

## 2023-11-17 RX ADMIN — FENTANYL CITRATE 25 MCG: 50 INJECTION INTRAMUSCULAR; INTRAVENOUS at 08:28

## 2023-11-17 RX ADMIN — PROPOFOL 140 MG: 10 INJECTION, EMULSION INTRAVENOUS at 07:39

## 2023-11-17 RX ADMIN — ACETAMINOPHEN 975 MG: 325 TABLET, FILM COATED ORAL at 22:07

## 2023-11-17 RX ADMIN — FENTANYL CITRATE 25 MCG: 50 INJECTION INTRAMUSCULAR; INTRAVENOUS at 08:23

## 2023-11-17 RX ADMIN — PHENYLEPHRINE HYDROCHLORIDE 100 MCG: 10 INJECTION INTRAVENOUS at 08:57

## 2023-11-17 RX ADMIN — CEFAZOLIN SODIUM 2 G: 2 INJECTION, SOLUTION INTRAVENOUS at 22:08

## 2023-11-17 RX ADMIN — PHENYLEPHRINE HYDROCHLORIDE 100 MCG: 10 INJECTION INTRAVENOUS at 09:54

## 2023-11-17 RX ADMIN — SUGAMMADEX 200 MG: 100 INJECTION, SOLUTION INTRAVENOUS at 10:21

## 2023-11-17 RX ADMIN — ACETAMINOPHEN 975 MG: 325 TABLET, FILM COATED ORAL at 13:12

## 2023-11-17 RX ADMIN — SENNOSIDES AND DOCUSATE SODIUM 1 TABLET: 8.6; 5 TABLET ORAL at 22:08

## 2023-11-17 RX ADMIN — ACETAMINOPHEN 975 MG: 325 TABLET, FILM COATED ORAL at 06:51

## 2023-11-17 RX ADMIN — MAGNESIUM SULFATE HEPTAHYDRATE 2 G: 2 INJECTION, SOLUTION INTRAVENOUS at 10:58

## 2023-11-17 RX ADMIN — ROCURONIUM BROMIDE 50 MG: 50 INJECTION, SOLUTION INTRAVENOUS at 07:39

## 2023-11-17 RX ADMIN — Medication 10 MG: at 07:39

## 2023-11-17 RX ADMIN — HYDROMORPHONE HYDROCHLORIDE 0.2 MG: 0.2 INJECTION, SOLUTION INTRAMUSCULAR; INTRAVENOUS; SUBCUTANEOUS at 01:56

## 2023-11-17 RX ADMIN — Medication 2 G: at 07:31

## 2023-11-17 ASSESSMENT — ACTIVITIES OF DAILY LIVING (ADL)
ADLS_ACUITY_SCORE: 28
ADLS_ACUITY_SCORE: 43
ADLS_ACUITY_SCORE: 43
ADLS_ACUITY_SCORE: 32
ADLS_ACUITY_SCORE: 43
ADLS_ACUITY_SCORE: 30
ADLS_ACUITY_SCORE: 32
ADLS_ACUITY_SCORE: 43

## 2023-11-17 NOTE — ANESTHESIA CARE TRANSFER NOTE
Patient: John Riley    Procedure: Procedure(s):  LEFT TOTAL HIP ARTHROPLASTY,       Diagnosis: Closed fracture of neck of left femur, initial encounter (H) [S72.002A]  Diagnosis Additional Information: No value filed.    Anesthesia Type:   General     Note:    Oropharynx: oropharynx clear of all foreign objects and spontaneously breathing  Level of Consciousness: drowsy  Oxygen Supplementation: face mask    Independent Airway: airway patency satisfactory and stable  Dentition: dentition unchanged  Vital Signs Stable: post-procedure vital signs reviewed and stable  Report to RN Given: handoff report given  Patient transferred to: PACU    Handoff Report: Identifed the Patient, Identified the Reponsible Provider, Reviewed the pertinent medical history, Discussed the surgical course, Reviewed Intra-OP anesthesia mangement and issues during anesthesia, Set expectations for post-procedure period and Allowed opportunity for questions and acknowledgement of understanding      Vitals:  Vitals Value Taken Time   BP     Temp     Pulse     Resp     SpO2         Electronically Signed By: JORGE Howe CRNA  November 17, 2023  10:36 AM

## 2023-11-17 NOTE — ANESTHESIA PREPROCEDURE EVALUATION
Anesthesia Pre-Procedure Evaluation    Patient: John Riley   MRN: [id number] : 1945        Procedure : Procedure(s):  LEFT TOTAL HIP ARTHROPLASTY,          Past Medical History:   Diagnosis Date    Basal cell carcinoma     early       History reviewed. No pertinent surgical history.   No Known Allergies   Social History     Tobacco Use    Smoking status: Never    Smokeless tobacco: Never   Substance Use Topics    Alcohol use: Yes      Wt Readings from Last 1 Encounters:   23 89.5 kg (197 lb 5 oz)        Anesthesia Evaluation   Pt has had prior anesthetic.     No history of anesthetic complications       ROS/MED HX  ENT/Pulmonary:  - neg pulmonary ROS     Neurologic:     (+)          CVA,                      Cardiovascular:     (+)  hypertension- -   -  - -   Taking blood thinners Pt has not received instructions:                             Previous cardiac testing   Echo: Date:  Results:  Left ventricular systolic function is normal.  The visual ejection fraction is estimated at 60-65%.  There is no comparison study available. The study was technically adequate.    Stress Test:  Date: Results:    ECG Reviewed:  Date: Results:    Cath:  Date: Results:      METS/Exercise Tolerance:     Hematologic:       Musculoskeletal:   (+)  arthritis,   fracture, Fracture location: LLE,         GI/Hepatic:  - neg GI/hepatic ROS     Renal/Genitourinary:  - neg Renal ROS     Endo:  - neg endo ROS     Psychiatric/Substance Use:  - neg psychiatric ROS     Infectious Disease:  - neg infectious disease ROS     Malignancy:   (+) Malignancy, History of Skin.Skin CA Remission status post.      Other:            Physical Exam    Airway        Mallampati: II   TM distance: > 3 FB   Neck ROM: full   Mouth opening: > 3 cm    Respiratory Devices and Support         Dental       (+) Modest Abnormalities - crowns, retainers, 1 or 2 missing teeth      Cardiovascular          Rhythm and rate: regular and normal      Pulmonary           breath sounds clear to auscultation           OUTSIDE LABS:  CBC:   Lab Results   Component Value Date    WBC 10.5 11/17/2023    WBC 10.5 11/16/2023    HGB 14.7 11/17/2023    HGB 14.6 11/16/2023    HCT 42.8 11/17/2023    HCT 41.8 11/16/2023     11/17/2023     11/16/2023     BMP:   Lab Results   Component Value Date     11/17/2023     11/16/2023    POTASSIUM 4.9 11/17/2023    POTASSIUM 4.0 11/16/2023    CHLORIDE 106 11/17/2023    CHLORIDE 105 11/16/2023    CO2 24 11/17/2023    CO2 21 (L) 11/16/2023    BUN 21.0 11/17/2023    BUN 18.6 11/16/2023    CR 1.03 11/17/2023    CR 1.01 11/16/2023     (H) 11/17/2023     (H) 11/16/2023     COAGS:   Lab Results   Component Value Date    PTT 29 12/10/2016    INR 0.91 11/17/2023     POC:   Lab Results   Component Value Date     (H) 12/10/2016     HEPATIC:   Lab Results   Component Value Date    ALBUMIN 3.7 12/10/2016    PROTTOTAL 6.7 (L) 12/10/2016    ALT 33 08/12/2019    AST 17 12/10/2016    ALKPHOS 93 12/10/2016    BILITOTAL 0.4 12/10/2016     OTHER:   Lab Results   Component Value Date    A1C 5.0 12/19/2016    PARVIN 8.6 (L) 11/17/2023    MAG 1.9 11/16/2023       Anesthesia Plan    ASA Status:  3    NPO Status:  NPO Appropriate    Anesthesia Type: General.     - Airway: ETT   Induction: Intravenous.   Maintenance: Balanced.   Techniques and Equipment:       - Drips/Meds: Dexmed. infusion     Consents    Anesthesia Plan(s) and associated risks, benefits, and realistic alternatives discussed. Questions answered and patient/representative(s) expressed understanding.     - Discussed:     - Discussed with:  Patient      - Extended Intubation/Ventilatory Support Discussed: No.      - Patient is DNR/DNI Status: No     Use of blood products discussed: No .     Postoperative Care    Pain management: IV analgesics, Oral pain medications, Multi-modal analgesia.     - Plan for long acting post-op opioid use   PONV  prophylaxis: Ondansetron (or other 5HT-3), Dexamethasone or Solumedrol     Comments:    Other Comments:     Methadone 10 mg  Decadron 8 mg  Zofran 4 mg  Toradol 15 mg  Precedex  Sugammadex if paralytic to be used             Parvez Millard MD

## 2023-11-17 NOTE — ANESTHESIA POSTPROCEDURE EVALUATION
Patient: John Riley    Procedure: Procedure(s):  LEFT TOTAL HIP ARTHROPLASTY,       Anesthesia Type:  General    Note:  Disposition: Inpatient   Postop Pain Control: Uneventful            Sign Out: Well controlled pain   PONV: No   Neuro/Psych: Uneventful            Sign Out: Acceptable/Baseline neuro status   Airway/Respiratory: Uneventful            Sign Out: Acceptable/Baseline resp. status   CV/Hemodynamics: Uneventful            Sign Out: Acceptable CV status   Other NRE: NONE   DID A NON-ROUTINE EVENT OCCUR? No           Last vitals:  Vitals Value Taken Time   /65 11/17/23 1215   Temp 96.9  F (36.1  C) 11/17/23 1154   Pulse 70 11/17/23 1215   Resp 13 11/17/23 1215   SpO2 96 % 11/17/23 1216   Vitals shown include unfiled device data.    Electronically Signed By: Parvez Millard MD  November 17, 2023  2:55 PM

## 2023-11-17 NOTE — ANESTHESIA PROCEDURE NOTES
Airway       Patient location during procedure: OR       Procedure Start/Stop Times: 11/17/2023 7:42 AM  Staff -        CRNA: Lizzie Jameson APRN CRNA       Performed By: CRNA  Consent for Airway        Urgency: emergent  Indications and Patient Condition       Indications for airway management: apolinar-procedural       Induction type:intravenous       Mask difficulty assessment: 1 - vent by mask    Final Airway Details       Final airway type: endotracheal airway       Successful airway: ETT - single  Endotracheal Airway Details        ETT size (mm): 8.0       Cuffed: yes       Successful intubation technique: direct laryngoscopy       DL Blade Type: MAC 4       Grade View of Cords: 1       Adjucts: stylet       Bite block used: Soft    Post intubation assessment        Placement verified by: capnometry        Number of attempts at approach: 1       Secured with: tape       Ease of procedure: easy       Dentition: Intact and Unchanged    Medication(s) Administered   Medication Administration Time: 11/17/2023 7:42 AM

## 2023-11-17 NOTE — OP NOTE
OPERATIVE NOTE    Name: John Riley  MRN: [id number]  Age: 78 year old    YOB: 1945    Date of Procedure: 11/17/2023      Pre-operative Diagnosis:    Left displaced femoral neck fracture    Post-operative Diagnosis:    Left displaced femoral neck fracture    Procedure:    Left total hip arthroplasty     Assistant:  PRASHANT Banks    A skilled first assistant was necessary for this procedure for assistance with patient positioning, prepping, draping, surgical visualization, wound closure, and application of the dressing.     Anesthesia:  1. General    Complications:  None    Estimated blood loss:   500cc     Transfusions:  None    Fluids:  Per anesthesia    Specimens:  None    Drain:  None      Indications:   The patient is a very pleasant 78 year old male who unfortunately sustained a mechanical ground-level fall on November 16, 2023.  He noticed immediate onset of pain in the left hip and was unable to bear weight after the fall.  He presented to [facility] emergency department where radiographs demonstrated left displaced femoral neck fracture.  After discussing the risk and benefits of both operative and nonoperative management, the patient would like to proceed with surgery in order to treat his pain and restore his activity.  Prior to the fall, he was a very active 78-year-old.  He did not use any assistive devices.  He was a community ambulator.  He is otherwise healthy.      Informed Consent:  Preoperatively, the risks, benefits, and possible complications of the procedure were discussed. The risks discussed included but were not limited to wear, loosening, infection, neurovascular damage, thromboembolic disease, foot drop, limb length inequality, persistent pain, lateral thigh numbness, wound healing complications, bleeding, transfusion, stiffness and dislocation. All of the questions were satisfactorily answered and the patient wished to proceed with joint replacement surgery to improve  their lifestyle and reduce their pain.       Components:  Depuy [place] 6-Hi offset   Depuy [place] 56mm cup  Biolox delta ceramic 36mm +5 head      Procedural Pause:   The patient's correct identity, side, site, and procedure to be performed was verified.  Intravenous antibiotics were administered prior to skin incision.    Description of Procedure:   John Riley was brought to the operating room.  The patient underwent general anesthesia.  He was subsequently transferred to the operating room table and placed in the lateral decubitus position.  All bony prominences were well-padded.  An axillary roll was placed.  The patient was prepped and draped in routine sterile fashion.  A procedural pause was performed as described above.  An incision was made over the lateral aspect of the femur in anticipation of an anterior lateral approach.  Sharp dissection was carried down to the level of the iliotibial fascia.  The fascia was split in line with the skin incision.  The abductors were divided and reflected in a sleeve with the vastus lateralis.  The gluteus minimus and hip capsule was then incised in line with the femoral neck.  The proximal femur was then further exposed and a neck cut was made using a broach as a guide.  We then removed the femoral head and exposed the acetabulum.  We started reaming with a 51 reamer and reamed to a 55 mm reamer and found that we had excellent bleeding bone, we are down to the medial wall, and we had great fit.  We then impacted a 56 mm cup into appropriate abduction and anteversion.  We checked to make sure the cup was down.  We then placed 3 acetabular screws.  We placed a cover for the central hole.  We then placed the real 36 mm neutral liner.  A Boutte elevator and a Kocher were utilized to make sure that the liner was fully seated.  We then receded to the femur.  A box osteotome was utilized to open the femoral canal. We then reamed and broached for our cemented stem.  We  broached up to a size 6 stem.  We then trialed with a neutral ball and found adequate stability and hip extension and external rotation, position of sleep, flexion to 90 degrees with internal rotation to approximately 70 degrees, and adequate restoration of leg length.  We then obtained an intraoperative radiograph to check component positioning.  Satisfied with the stability and position of the implants, we prepared the femur for cement.  A cement plug was inserted into the femoral canal.  The canal was then irrigated and dried.  The cement was introduced in retrograde fashion and the femoral stem was then inserted and held in place until the cement cured. We then cleaned excess cement and trialed again with a +5 ball.  Satisfied with the hip stability and leg length, we impacted the real 36 mm +5 femoral head and proceeded to closure.  The wound was irrigated with 3 L of normal saline and Betadine solution.  We then injected arthroplasty block.  The hip capsule was closed with 0 Vicryl suture.  The abductors were closed with #5 Ethibond and 0 Vicryl.  The fascia was closed with interrupted 0 Vicryl suture and #1 strata fix.  The subcutaneous layer was then closed with 2-0 Vicryl and the skin was closed with staples.  A sterile dressing was placed.  The patient woke up from anesthesia without complication.  All counts were correct.  The patient will transfer to the recovery room and back to his inpatient bed.      Post-operative Plan:  Activity: Weightbearing as tolerated  Antibiotics: Weight-based Ancef x2 doses   DVT: Plavix  Wound: Aquacel for 7 days   Disposition: Inpatient      Saad Estrada MD  [facility]  Phone: [phone]  Fax: [phone]

## 2023-11-17 NOTE — CONSULTS
CONSULT NOTE  Location: [facility]    SUBJECTIVE  John Riley is a 78 year old who presented to the Emergency Department on 11/16/23 after a ground level fall while playing table tennis. He had immediate onset of pain in the left hip and was unable to bear weight after the fall. He was brought to the ED where radiographs demonstrated a left displaced femoral neck fracture. Orthopedics was consulted for further evaluation and management.   He reports pain in the left groin.  He denies pain anywhere else.  He did not hit his head and he did not lose consciousness.  He is very active.  He does not use any assistive devices.  He reports only minimal left hip pain prior to the fall yesterday.      PAST MEDICAL HISTORY  He is otherwise healthy.  He does take Plavix after a CVA.  No history of diabetes.  No history of DVT/PE.  No history of coronary artery disease.    SOCIAL HISTORY  He lives independently.    PHYSICAL EXAM  General: No acute distress.  Alert and oriented.  Cardiac: Regular rate and rhythm.  Palpable PT pulse.  Pulmonary: Breathing comfortably on room air.  Skin: I do not appreciate any open wounds or previous incisions over the left hip.  Musculoskeletal: Pain with left lower extremity logroll.  No tenderness palpation about the left knee, lower leg, or ankle.  No pain with range of motion of the left knee or left ankle.  Pain with range of motion of the left shoulder.  No pain with logroll of the right lower extremity.  No pain with range of motion of the right knee or ankle.  Neuro: He is able to fire TA, EHL, and gastroc.  Sensation intact to light touch in the left lower extremity.    IMAGING  Radiographs of the left hip and pelvis demonstrate a left displaced femoral neck fracture.  Radiographs of the left shoulder do not demonstrate any evidence of fracture or dislocation.    LABS  Hemoglobin: 14.7    ASSESSMENT/PLAN  #1  Left displaced femoral neck fracture    Mr. Riley is a  very pleasant 78-year-old gentleman who unfortunately sustained a ground-level fall on November 16, 2023 while playing table tennis.  He was subsequently brought to the emergency department radiographs demonstrated a left displaced femoral neck fracture.  I had a long discussion with the patient regarding the risks and benefits of both operative and nonoperative management.  Given his overall health and activity level, I recommend proceeding with left total hip arthroplasty.  I discussed the risks and benefits of surgery including infection, dislocation, intraoperative or postoperative fracture, component failure, heart attack, stroke, death, DVT/PE, leg length discrepancy, and damage to nerves or vessels.  He understands the risks and would like to proceed with surgery today.

## 2023-11-17 NOTE — PLAN OF CARE
A&Ox4. Bedrest. Surgery scheduled for 0730.  VSS. 02 - 96% on RA. LS - clear.  Bowel sounds hypoactive 4Q. Denies nausea. Denies hunger. LR infusing 100ml/hr.   Cool extremities. Voiding via urinal.

## 2023-11-17 NOTE — OR NURSING
Pt's pre procedure care completed,wife updated and she will come to see pt after surgery,she said.

## 2023-11-17 NOTE — H&P
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  He fell onto his left side with his shoulder and hip taking the majority of the impact.  He did not hit his head or loss consciousness.  He was unable to get up or bear weight and EMS was called.  He endorses pain to his left should and hip.  No history of prior injury or total joint arthroplasty to either the shoulder or hip.  No chest pain, syncopal symptoms, infectious symptoms.      Acute medical issues:  #L femoral neck fracture  #L shoulder sprain/pain after fall.  No evidence of acute fracture.  Mild L glenohumeral and AC degenerative arthrosis.   #Mechanical fall while playing [employer] pong  #HTN urgency in the setting of the above.  #Preoperative evaluation    Plan:  -- Admit to Medicine  -- Orthopedic consult appreciated. Planning for L HELGA sometime tomorrow.  NPO after MN.  -- Baseline EKG pending.  -- Regular diet tonight. NPO at MN.  -- Pain management and antiemetics.  PRN hydralazine.  -- PT consult postoperatively.    -- CBC, BMP, INR, and T and S in the AM.     EKG is pending but otherwise is medically optimized for OR.     Chronic medical issues:  #HTN:  Treat pain.  PRN hydralazine.  Hold lisiniopril in the AM.  #Hx crytogenic CVA '16:  Hold clopidogrel in the AM.  No ongoing deficits. Monitor.   # Hx of posterior rib fracture (rib #8 on the left): No acute issues.  Pulmonary toilet.         Diet: NPO per Anesthesia Guidelines for Procedure/Surgery Except for: Meds, Ice Chips  Combination Diet Regular Diet Adult  DVT Prophylaxis: Low Risk/Ambulatory with no VTE prophylaxis  "indicated  Sarah Catheter: Not present  Lines: None     Cardiac Monitoring: None  Code Status: Full Code    Clinically Significant Risk Factors Present on Admission                # Drug Induced Platelet Defect: home medication list includes an antiplatelet medication   # Hypertension: Noted on problem list      # Overweight: Estimated body mass index is 28.17 kg/m  as calculated from the following:    Height as of this encounter: 1.803 m (5' 11\").    Weight as of this encounter: 91.6 kg (202 lb).              Disposition Plan  per clinical course.     Expected Discharge Date: 11/18/2023                The patient's care was discussed with the Bedside Nurse, Patient, Patient's Family, Ortho Consultant(s), and EM Team.    JORGE Estrada 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  He fell onto his left side with his shoulder and hip taking the majority of the impact.  He did not hit his head or loss consciousness.  He was unable to get up or bear weight and EMS was called.  He endorses pain to his left should and hip.  No history of prior injury or total joint arthroplasty to either the shoulder or hip.  No chest pain, syncopal symptoms, infectious symptoms.      ED course: IV, labs, imaging, and treatment.  Pertinent findings:  Labs: 10.5>14.6<195K,  BMP WNL  Imaging: L femoral neck fracture. L " shoulder without acute fracture or dislocation.  Pertinent treatment: Dilaudid 0.5 mg and Zofran 4 mg.     Being admitted on behalf of Orthopedics for L HELGA (likely via direct anterior approach) on 11/17/2023.      Past Medical History    Past Medical History:   Diagnosis Date    Basal cell carcinoma     early 90's   HTN  Crytogenic CVA '16    Past Surgical History   Jaw surgery    Prior to Admission Medications   Prior to Admission Medications   Prescriptions Last Dose Informant Patient Reported? Taking?   Ascorbic Acid (VITAMIN C PO) 11/15/2023 at unknown time Self Yes Yes   Sig: Take 1,000 mg by mouth daily   Cyanocobalamin (VITAMIN B-12 PO) 11/15/2023 at unknown time Self Yes Yes   Sig: Take 1 tablet by mouth daily   Vitamin D3 (CHOLECALCIFEROL) 25 mcg (1000 units) tablet 11/15/2023 at unknown time Self Yes Yes   Sig: Take 1,000 Units by mouth daily   atorvastatin (LIPITOR) 20 MG tablet 11/16/2023 at AM  No Yes   Sig: Take 1 tablet (20 mg) by mouth daily   clopidogrel (PLAVIX) 75 MG tablet 11/16/2023 at AM  No Yes   Sig: Take 1 tablet (75 mg) by mouth daily   lisinopril (ZESTRIL) 5 MG tablet 11/16/2023 at AM  Yes Yes   Sig: Take 5 mg by mouth daily   multivitamin, therapeutic with minerals (THERA-VIT-M) TABS tablet 11/15/2023 at unknown time Self Yes Yes   Sig: Take 1 tablet by mouth daily      Facility-Administered Medications: None        Review of Systems    The 10 point Review of Systems is negative other than noted in the HPI or here.   Anesthesia ROS;  RCRI Class 1 Risk 3.9% 30 day M/M risk.  METS >/=4.  No CP or SOB.  Hx of PONV. EKG preoperative pending.   ASA II    Social History   I have reviewed this patient's social history and updated it with pertinent information if needed.  Social History     Tobacco Use    Smoking status: Never    Smokeless tobacco: Never   Vaping Use    Vaping Use: Never used   Substance Use Topics    Alcohol use: Yes    Drug use: No         Family History   I have reviewed this  patient's family history and updated it with pertinent information if needed.  Family History   Problem Relation Age of Onset    Heart Disease Mother          Allergies   No Known Allergies     Physical Exam   Vital Signs: Temp: 97.1  F (36.2  C) Temp src: Oral BP: 133/77 Pulse: 77   Resp: 20 SpO2: 96 %      Weight: 202 lbs 0 oz    GEN:   Alert, oriented x 3, appears comfortable, NAD.  NECK:   Supple ,no mass or thyromegaly   HEENT:  Normocephalic/atraumatic, no scleral icterus, no nasal discharge, mouth moist.  CV:   Regular rate and rhythm, no murmur or JVD.  S1 + S2 noted, no S3 or S4.  LUNGS:   Clear to auscultation bilaterally without rales/rhonchi/wheezing/retractions.  Symmetric chest rise on inhalation noted.  ABD:   Active bowel sounds, soft, non-tender/non-distended.  No rebound/guarding/rigidity.  EXT:  DE [place]. L shoulder tender to palpation with mild pain to passive ROM. Guarding.  Skin tear to L elbow.  L hip tenderness to palpation.  Mild internal rotation. No edema.  No cyanosis.  No joint synovitis noted.  SKIN:   Dry to touch, no exanthems noted in the visualized areas.  Neurologic: Grossly intact,non focal. Spine: No CTLS tenderness.  Neuropsychiatric:  General: normal, calm and normal eye contact  Level of consciousness: alert / normal  Affect: normal  Orientation: oriented to self, place, time and situation     Medical Decision Making       90 MINUTES SPENT BY ME on the date of service doing chart review, history, exam, documentation & further activities per the note.      Data   ------------------------- PAST 24 HR DATA REVIEWED -----------------------------------------------    I have personally reviewed the following data over the past 24 hrs:    10.5  \   14.6   / 195     138 105 18.6 /  122 (H)   4.0 21 (L) 1.01 \       Imaging results reviewed over the past 24 hrs:   Recent Results (from the past 24 hour(s))   XR Shoulder Left G/E 3 Views    Narrative    EXAM: XR SHOULDER LEFT G/E 3  VIEWS  LOCATION: [facility]  DATE: 11/16/2023    INDICATION: Left shoulder pain after a fall.    COMPARISON: None.      Impression    IMPRESSION:   1.  No acute fracture or joint malalignment.  2.  Mild left glenohumeral and acromioclavicular degenerative arthrosis.  3.  Old healed left posterior 8th rib fracture.     XR Pelvis w Hip Left 1 View    Narrative    EXAM: XR PELVIS AND HIP LEFT 1 VIEW  LOCATION: [facility]  DATE: 11/16/2023    INDICATION: Left hip pain with fall  COMPARISON: None.      Impression    IMPRESSION: Acute left femoral neck fracture, a mid cervical fracture with mild impaction and angulation. The trochanters appear intact. The femoral head articular surface is intact. Left hip joint alignment is normal. Left hip joint spacing is   maintained with minimal degenerative arthritic spurring. Mild-moderate degenerative joint space narrowing and spurring in the right hip. Moderate-advanced degenerative disc and facet changes in the lower lumbar spine. Mild degenerative arthritic changes   in the SI joints.     EKG pending.

## 2023-11-17 NOTE — PLAN OF CARE
Goal Outcome Evaluation:      Plan of Care Reviewed With: patient    Overall Patient Progress: improvingOverall Patient Progress: improving     From PACU at 1230.  A/O4.  Pain managed with PRN pain meds + cold.  No nausea.  LS clear bilaterally, 2L O2, encouraged CDB hourly.  BS hypo, gas-,  CMS intact.  Drsg CDI.  Oriented to room and call system, denies questions.  Educated on IS use hourly, denies question.      Afebrile.  Pain managed with PRN PO pain med + cold.  No nausea.  LS clear bilat., RA, encouraged hourly CDB/IS x10.  CMS+.  Drsg CDI.    Voiding due to void LBM 11/16/23 Bladder scan at 1430 for 378ml. Straight cath 500 and above Total left hip done this AM .

## 2023-11-18 ENCOUNTER — APPOINTMENT (OUTPATIENT)
Dept: PHYSICAL THERAPY | Facility: CLINIC | Age: 78
DRG: 522 | End: 2023-11-18
Attending: PHYSICIAN ASSISTANT
Payer: COMMERCIAL

## 2023-11-18 ENCOUNTER — APPOINTMENT (OUTPATIENT)
Dept: OCCUPATIONAL THERAPY | Facility: CLINIC | Age: 78
DRG: 522 | End: 2023-11-18
Attending: PHYSICIAN ASSISTANT
Payer: COMMERCIAL

## 2023-11-18 ENCOUNTER — APPOINTMENT (OUTPATIENT)
Dept: PHYSICAL THERAPY | Facility: CLINIC | Age: 78
DRG: 522 | End: 2023-11-18
Payer: COMMERCIAL

## 2023-11-18 LAB
ANION GAP SERPL CALCULATED.3IONS-SCNC: 8 MMOL/L (ref 7–15)
BUN SERPL-MCNC: 27.6 MG/DL (ref 8–23)
CALCIUM SERPL-MCNC: 8 MG/DL (ref 8.8–10.2)
CHLORIDE SERPL-SCNC: 102 MMOL/L (ref 98–107)
CREAT SERPL-MCNC: 1.17 MG/DL (ref 0.67–1.17)
DEPRECATED HCO3 PLAS-SCNC: 25 MMOL/L (ref 22–29)
EGFRCR SERPLBLD CKD-EPI 2021: 63 ML/MIN/1.73M2
GLUCOSE SERPL-MCNC: 104 MG/DL (ref 70–99)
HGB BLD-MCNC: 10.5 G/DL (ref 13.3–17.7)
MAGNESIUM SERPL-MCNC: 2.3 MG/DL (ref 1.7–2.3)
POTASSIUM SERPL-SCNC: 4.5 MMOL/L (ref 3.4–5.3)
SODIUM SERPL-SCNC: 135 MMOL/L (ref 135–145)

## 2023-11-18 PROCEDURE — 250N000013 HC RX MED GY IP 250 OP 250 PS 637: Performed by: STUDENT IN AN ORGANIZED HEALTH CARE EDUCATION/TRAINING PROGRAM

## 2023-11-18 PROCEDURE — 97535 SELF CARE MNGMENT TRAINING: CPT | Mod: GO

## 2023-11-18 PROCEDURE — 250N000013 HC RX MED GY IP 250 OP 250 PS 637: Performed by: PHYSICIAN ASSISTANT

## 2023-11-18 PROCEDURE — 97161 PT EVAL LOW COMPLEX 20 MIN: CPT | Mod: GP

## 2023-11-18 PROCEDURE — 97165 OT EVAL LOW COMPLEX 30 MIN: CPT | Mod: GO

## 2023-11-18 PROCEDURE — 85018 HEMOGLOBIN: CPT | Performed by: PHYSICIAN ASSISTANT

## 2023-11-18 PROCEDURE — 250N000011 HC RX IP 250 OP 636: Mod: JZ | Performed by: INTERNAL MEDICINE

## 2023-11-18 PROCEDURE — 36415 COLL VENOUS BLD VENIPUNCTURE: CPT | Performed by: STUDENT IN AN ORGANIZED HEALTH CARE EDUCATION/TRAINING PROGRAM

## 2023-11-18 PROCEDURE — 83735 ASSAY OF MAGNESIUM: CPT | Performed by: STUDENT IN AN ORGANIZED HEALTH CARE EDUCATION/TRAINING PROGRAM

## 2023-11-18 PROCEDURE — 250N000013 HC RX MED GY IP 250 OP 250 PS 637: Performed by: NURSE PRACTITIONER

## 2023-11-18 PROCEDURE — 97110 THERAPEUTIC EXERCISES: CPT | Mod: GP

## 2023-11-18 PROCEDURE — 99232 SBSQ HOSP IP/OBS MODERATE 35: CPT | Performed by: STUDENT IN AN ORGANIZED HEALTH CARE EDUCATION/TRAINING PROGRAM

## 2023-11-18 PROCEDURE — 120N000001 HC R&B MED SURG/OB

## 2023-11-18 PROCEDURE — 80048 BASIC METABOLIC PNL TOTAL CA: CPT | Performed by: STUDENT IN AN ORGANIZED HEALTH CARE EDUCATION/TRAINING PROGRAM

## 2023-11-18 PROCEDURE — 97116 GAIT TRAINING THERAPY: CPT | Mod: GP

## 2023-11-18 RX ORDER — TRAMADOL HYDROCHLORIDE 50 MG/1
50 TABLET ORAL EVERY 6 HOURS PRN
Status: DISCONTINUED | OUTPATIENT
Start: 2023-11-18 | End: 2023-11-18

## 2023-11-18 RX ORDER — MULTIPLE VITAMINS W/ MINERALS TAB 9MG-400MCG
1 TAB ORAL DAILY
Status: DISCONTINUED | OUTPATIENT
Start: 2023-11-18 | End: 2023-11-21 | Stop reason: HOSPADM

## 2023-11-18 RX ORDER — LISINOPRIL 5 MG/1
5 TABLET ORAL DAILY
Status: DISCONTINUED | OUTPATIENT
Start: 2023-11-18 | End: 2023-11-21 | Stop reason: HOSPADM

## 2023-11-18 RX ORDER — KETOROLAC TROMETHAMINE 15 MG/ML
15 INJECTION, SOLUTION INTRAMUSCULAR; INTRAVENOUS EVERY 6 HOURS PRN
Status: DISCONTINUED | OUTPATIENT
Start: 2023-11-18 | End: 2023-11-21 | Stop reason: HOSPADM

## 2023-11-18 RX ORDER — HALOPERIDOL 5 MG/ML
2 INJECTION INTRAMUSCULAR ONCE
Status: COMPLETED | OUTPATIENT
Start: 2023-11-18 | End: 2023-11-18

## 2023-11-18 RX ORDER — HALOPERIDOL 1 MG/1
2 TABLET ORAL ONCE
Status: COMPLETED | OUTPATIENT
Start: 2023-11-18 | End: 2023-11-18

## 2023-11-18 RX ADMIN — MULTIPLE VITAMINS W/ MINERALS TAB 1 TABLET: TAB at 12:26

## 2023-11-18 RX ADMIN — ACETAMINOPHEN 975 MG: 325 TABLET, FILM COATED ORAL at 16:10

## 2023-11-18 RX ADMIN — FAMOTIDINE 20 MG: 20 TABLET ORAL at 20:37

## 2023-11-18 RX ADMIN — OXYCODONE HYDROCHLORIDE 5 MG: 5 TABLET ORAL at 12:26

## 2023-11-18 RX ADMIN — HALOPERIDOL 2 MG: 1 TABLET ORAL at 16:11

## 2023-11-18 RX ADMIN — SENNOSIDES AND DOCUSATE SODIUM 1 TABLET: 8.6; 5 TABLET ORAL at 20:37

## 2023-11-18 RX ADMIN — METHOCARBAMOL 500 MG: 500 TABLET ORAL at 01:34

## 2023-11-18 RX ADMIN — FAMOTIDINE 20 MG: 20 TABLET ORAL at 08:42

## 2023-11-18 RX ADMIN — METHOCARBAMOL 500 MG: 500 TABLET ORAL at 12:26

## 2023-11-18 RX ADMIN — ATORVASTATIN CALCIUM 20 MG: 20 TABLET, FILM COATED ORAL at 08:42

## 2023-11-18 RX ADMIN — OXYCODONE HYDROCHLORIDE 5 MG: 5 TABLET ORAL at 00:28

## 2023-11-18 RX ADMIN — HALOPERIDOL LACTATE 2 MG: 5 INJECTION, SOLUTION INTRAMUSCULAR at 17:59

## 2023-11-18 RX ADMIN — HYDROXYZINE HYDROCHLORIDE 10 MG: 10 TABLET ORAL at 21:33

## 2023-11-18 RX ADMIN — TRAMADOL HYDROCHLORIDE 50 MG: 50 TABLET, COATED ORAL at 17:31

## 2023-11-18 RX ADMIN — ACETAMINOPHEN 975 MG: 325 TABLET, FILM COATED ORAL at 06:42

## 2023-11-18 ASSESSMENT — ACTIVITIES OF DAILY LIVING (ADL)
ADLS_ACUITY_SCORE: 30
ADLS_ACUITY_SCORE: 30
ADLS_ACUITY_SCORE: 32
ADLS_ACUITY_SCORE: 30
ADLS_ACUITY_SCORE: 32
ADLS_ACUITY_SCORE: 32
DEPENDENT_IADLS:: INDEPENDENT
ADLS_ACUITY_SCORE: 30
ADLS_ACUITY_SCORE: 32
ADLS_ACUITY_SCORE: 30

## 2023-11-18 NOTE — PLAN OF CARE
Goal Outcome Evaluation:      Plan of Care Reviewed With: patient    Overall Patient Progress: improvingOverall Patient Progress: improving     Patient vital signs are at baseline: Yes  Patient able to ambulate as they were prior to admission or with assist devices provided by therapies during their stay:  Yes  Patient MUST void prior to discharge:  Yes  Patient able to tolerate oral intake:  Yes  Pain has adequate pain control using Oral analgesics:  Yes  Does patient have an identified :  Yes  Has goal D/C date and time been discussed with patient:  Yes    A&O x2, disoriented to time and situation. Assist x1 with walker and GB. VSS, held lisinopril. Early in the day patient denied pain and refused pain medications. Ambulated in halls to relieve pain. Pain level increased, PRN oxy and robaxin given with some relief. Scheduled tylenol. Pt setting off bed and chair alarm, doesn't want to use walker and tries to remove gait belt. Pt is extremely unsteady. Refused breakfast and lunch, PO intake encouraged, snacks provided. Pt ate applesauce and crackers. Voiding. CMS intact, dressing CDI. Ice applied. Rt calf smaller than left calf at baseline. PT will see patient again this afternoon. Plan is home possibly tomorrow.

## 2023-11-18 NOTE — PLAN OF CARE
Goal Outcome Evaluation:       Patient vital signs are at baseline: Yes  Patient able to ambulate as they were prior to admission or with assist devices provided by therapies during their stay:  No,  Reason: Assist with Gb+walker  Patient MUST void prior to discharge:  Yes  Patient able to tolerate oral intake:  Yes  Pain has adequate pain control using Oral analgesics:  Yes  Does patient have an identified :  Yes  Has goal D/C date and time been discussed with patient:  Yes        Pt alert and oriented x4, VSS in Ra. Pain given PRN medication. Voiding via urinal.       Denies nausea.  Ambulated outside the room. Pt  sitting in the recliner. Plan of car ongoing.

## 2023-11-18 NOTE — PROGRESS NOTES
Orthopedic Surgery  John Riley  11/18/2023     Admit Date:  11/16/2023    POD: 1 Day Post-Op   Procedure(s):  LEFT TOTAL HIP ARTHROPLASTY,     Patient up and walking in the halls. Endorses post operative pain. RN reports he is refusing pain medications.  Tolerating oral intake.    Denies nausea or vomiting  Denies chest pain or shortness of breath    Temp:  [97.1  F (36.2  C)-99.1  F (37.3  C)] 97.1  F (36.2  C)  Pulse:  [69-89] 89  Resp:  [16-19] 19  BP: (101-125)/(65-74) 110/71  SpO2:  [94 %-100 %] 100 %    Alert and oriented  Dressing is clean, dry, and intact.   Minimal erythema of the surrounding skin.   Bilateral calves are soft, non-tender.  Left lower extremity is NVI.  Sensation intact bilateral lower extremities  Patient able to resist dorsi and plantar flexion bilaterally  +Dp pulse    Labs:  Recent Labs   Lab Test 11/18/23  0738 11/17/23  0606 11/16/23  1659 04/19/22  0729   WBC  --  10.5 10.5 5.4   HGB 10.5* 14.7 14.6 15.9   PLT  --  196 195 221     Recent Labs   Lab Test 11/17/23  0606 12/10/16  1905   INR 0.91 0.95     No lab results found.      1. PLAN:   PTA plavix for DVT prophylaxis.     Mobilize with PT/OT    WBAT LLe.     Continue current pain regiment.   Dressings: Keep intact.  Change if >60% saturated or peeling off.    Follow-up: 2 weeks post-op with Dr. Estrada    2. Disposition   Anticipate d/c to home when medically cleared and progressing in PT. Not ready for discharge today, Pt will continue to work with Azam this afternoon. Re-address tomorrow.     Janel Dove PA-C

## 2023-11-18 NOTE — PROGRESS NOTES
Patient does not have HX of BEZHAD, CPAP equipment not set up.    Edmundo Gutierrez, RT on 11/17/2023 at 9:05 PM

## 2023-11-18 NOTE — PROGRESS NOTES
11/18/23 0901   Appointment Info   Signing Clinician's Name / Credentials (PT) Maria L Dawson DPT   Rehab Comments (PT) LLE WBAT, no hip precautions   Quick Adds   Quick Adds Certification   Living Environment   People in Home spouse   Current Living Arrangements house   Home Accessibility stairs to enter home;stairs within home   Number of Stairs, Main Entrance 4   Stair Railings, Main Entrance railings safe and in good condition   Number of Stairs, Within Home, Primary greater than 10 stairs  (pt has chairlift to reach bedroom/bathroom)   Stair Railings, Within Home, Primary railings safe and in good condition   Transportation Anticipated family or friend will provide   Self-Care   Usual Activity Tolerance good   Current Activity Tolerance moderate   Equipment Currently Used at Home none   Fall history within last six months yes   Number of times patient has fallen within last six months 1   General Information   Onset of Illness/Injury or Date of Surgery 11/16/23   Referring Physician Shaye Serrano PA-C   Patient/Family Therapy Goals Statement (PT) return home   Pertinent History of Current Problem (include personal factors and/or comorbidities that impact the POC) John Riley is a 78 year old who presented to the Emergency Department on 11/16/23 after a ground level fall while playing table tennis. He had immediate onset of pain in the left hip and was unable to bear weight after the fall. He was brought to the ED where radiographs demonstrated a left displaced femoral neck fracture. Pt s/p L HELGA   Existing Precautions/Restrictions fall;weight bearing   Weight-Bearing Status - LLE weight-bearing as tolerated   Cognition   Affect/Mental Status (Cognition) WFL   Orientation Status (Cognition) oriented x 4   Follows Commands (Cognition) WFL   Pain Assessment   Patient Currently in Pain Yes, see Vital Sign flowsheet   Integumentary/Edema   Integumentary/Edema Comments incision covered   Posture     Posture Forward head position;Protracted shoulders   Range of Motion (ROM)   Range of Motion ROM deficits secondary to surgical procedure   Strength (Manual Muscle Testing)   Strength (Manual Muscle Testing) Deficits observed during functional mobility   Bed Mobility   Comment, (Bed Mobility) not observed at eval   Transfers   Comment, (Transfers) To sit<>Stand with FWW   Gait/Stairs (Locomotion)   Distance in Feet (Gait) 10' for eval   Comment, (Gait/Stairs) CGA with FWW ambulation   Balance   Balance Comments good sitting balance, fair standing balance with FWW   Sensory Examination   Sensory Perception patient reports no sensory changes   Clinical Impression   Criteria for Skilled Therapeutic Intervention Yes, treatment indicated   PT Diagnosis (PT) impaired mobility   Influenced by the following impairments decreased activity tolerance, weakness, pain, impaired balance   Functional limitations due to impairments impaired bed mobility, transfers, ambulation, stairs   Clinical Presentation (PT Evaluation Complexity) stable   Clinical Presentation Rationale clinical judgement, chart review   Clinical Decision Making (Complexity) low complexity   Planned Therapy Interventions (PT) balance training;bed mobility training;gait training;home exercise program;neuromuscular re-education;patient/family education;stair training;strengthening;transfer training;progressive activity/exercise;risk factor education;home program guidelines   Risk & Benefits of therapy have been explained evaluation/treatment results reviewed;care plan/treatment goals reviewed;risks/benefits reviewed;current/potential barriers reviewed;participants voiced agreement with care plan;participants included;patient   PT Total Evaluation Time   PT Hussein Low Complexity Minutes (62824) 6   Therapy Certification   Start of care date 11/18/23   Certification date from 11/18/23   Certification date to 11/21/23   Medical Diagnosis s/p L HELGA   Physical  Therapy Goals   PT Frequency 2x/day   PT Predicted Duration/Target Date for Goal Attainment 11/19/23   PT Goals Bed Mobility;Transfers;Gait;Stairs   PT: Bed Mobility Independent;Supine to/from sit   PT: Transfers Supervision/stand-by assist;Sit to/from stand;Assistive device   PT: Gait Supervision/stand-by assist;Rolling walker;150 feet   PT: Stairs Supervision/stand-by assist;4 stairs;Rail on right   Interventions   Interventions Quick Adds Gait Training;Therapeutic Procedure;Therapeutic Activity   Therapeutic Procedure/Exercise   Ther. Procedure: strength, endurance, ROM, flexibillity Minutes (33547) 8   Symptoms Noted During/After Treatment fatigue;increased pain   Treatment Detail/Skilled Intervention Pt cued to complete supine exercises including APs, quad sets, glute sets, and SLRs x10 each. Cues for slow and controlled movement, needing To for SLRs. Provided handout.   Therapeutic Activity   Therapeutic Activities: dynamic activities to improve functional performance Minutes (83586) 3   Symptoms Noted During/After Treatment None   Treatment Detail/Skilled Intervention Pt sitting in chair upon arrival, agreeable. Edu on WBAT, no precautions. After eval, pt returned to chair, left with all needs in reach and alarm on. Nurse updated.   Gait Training   Gait Training Minutes (69276) 8   Symptoms Noted During/After Treatment (Gait Training) fatigue;increased pain   Treatment Detail/Skilled Intervention After eval, pt ambulated another 80' with CGA and FWW. Demonstrating slow gait speed, short step length, forward flexed posture. Cuing for walker management jacky with turns, increasing step length. Pt demonstrating improvements with cueing. Edu on continued ambulation to improve activity tolerance, strength, and independence with mobility.   Distance in Feet 80'   PT Discharge Planning   PT Plan trial stairs, progress amb distance   PT Discharge Recommendation (DC Rec) home with home care physical therapy   PT  Rationale for DC Rec Pt below baseline level of mobility, IND at basline. Pt currently needing To for transfers with walker and CGA with walker to ambulate 80'. Anticipate with IP PT that pt will progress to return home. Recommend HHPT to progress independence with mobility.   PT Brief overview of current status Ax1 FWW   PT Equipment Needed at Discharge   (pt has walker at home)   Total Session Time   Timed Code Treatment Minutes 19   Total Session Time (sum of timed and untimed services) 25     [facility]  OUTPATIENT PHYSICAL THERAPY EVALUATION  PLAN OF TREATMENT FOR OUTPATIENT REHABILITATION  (COMPLETE FOR INITIAL CLAIMS ONLY)  Patient's Last Name, First Name, M.I.  YOB: 1945  OsvaldoJohn  LAYA                        Provider's Name  [facility] Medical Record No.  [id number]                             Onset Date:  11/16/23   Start of Care Date:  11/18/23   Type:     _X_PT   ___OT   ___SLP Medical Diagnosis:  s/p L HELGA              PT Diagnosis:  impaired mobility Visits from SOC:  1     See note for plan of treatment, functional goals and certification details    I CERTIFY THE NEED FOR THESE SERVICES FURNISHED UNDER        THIS PLAN OF TREATMENT AND WHILE UNDER MY CARE     (Physician co-signature of this document indicates review and certification of the therapy plan).

## 2023-11-18 NOTE — PROVIDER NOTIFICATION
Alyson to Dr. Comer requesting PRN for agitation. Also wondering about switching to tramadol for pain. Agitation seemed to increase after giving oxy.

## 2023-11-18 NOTE — PROGRESS NOTES
11/18/23 0904   Appointment Info   Signing Clinician's Name / Credentials (OT) Renuka Millard, OTR/L   Living Environment   People in Home spouse   Current Living Arrangements house   Living Environment Comments Reports spouse is almost 80 years old and cannot provide physical assist at discharge. Reports has daughter who cannot physically assist   Self-Care   Usual Activity Tolerance good   Current Activity Tolerance moderate   Equipment Currently Used at Home none  (Step in shower. Comfort height toilet. Has access to reacher.)   Fall history within last six months yes   Number of times patient has fallen within last six months 1   Activity/Exercise/Self-Care Comment Pt reports at baseline he is independent in self-cares without a gait aid   Instrumental Activities of Daily Living (IADL)   Previous Responsibilities driving;yardwork;medication management   IADL Comments Reports spouse does cooking, cleaning, laundry   General Information   Onset of Illness/Injury or Date of Surgery 11/16/23   Referring Physician Shaye Serrano PA-C   Patient/Family Therapy Goal Statement (OT) Return home   Additional Occupational Profile Info/Pertinent History of Current Problem John Riley is a 78 year old who presented to the Emergency Department on 11/16/23 after a ground level fall while playing table tennis. He had immediate onset of pain in the left hip and was unable to bear weight after the fall. He was brought to the ED where radiographs demonstrated a left displaced femoral neck fracture. Pt s/p L HELGA   Existing Precautions/Restrictions fall   Cognitive Status Examination   Affect/Mental Status (Cognitive) WFL   Follows Commands follows two-step commands;increased processing time needed;initiation impaired   Memory Deficit moderate deficit   Executive Function Deficit moderate deficit   Visual Perception   Visual Impairment/Limitations corrective lenses full-time   Sensory   Sensory Comments Pt denies  BUE/BLE numbness or tingling   Transfers   Transfers toilet transfer;shower transfer;sit-stand transfer   Sit-Stand Transfer   Sit-Stand [place] (Transfers) moderate assist (50% patient effort)   Shower Transfer   [place] Level (Shower Transfer) moderate assist (50% patient effort)   Toilet Transfer   [place] Level (Toilet Transfer) minimum assist (75% patient effort)   Activities of Daily Living   BADL Assessment/Intervention lower body dressing;toileting;grooming   Lower Body Dressing Assessment/Training   [place] Level (Lower Body Dressing) maximum assist (25% patient effort)   Grooming Assessment/Training   [place] Level (Grooming) supervision   Toileting   [place] Level (Toileting) moderate assist (50% patient effort)   Clinical Impression   Criteria for Skilled Therapeutic Interventions Met (OT) Yes, treatment indicated   OT Diagnosis Decline function   OT Problem List-Impairments impacting ADL activity tolerance impaired;balance;mobility;pain   Assessment of Occupational Performance 5 or more Performance Deficits   Identified Performance Deficits LB dressing, toileting, bathing, functional mobility, strenuous IADLs, grooming   Planned Therapy Interventions (OT) ADL retraining;home program guidelines;progressive activity/exercise;transfer training   Clinical Decision Making Complexity (OT) problem focused assessment/low complexity   Risk & Benefits of therapy have been explained evaluation/treatment results reviewed;care plan/treatment goals reviewed;risks/benefits reviewed;current/potential barriers reviewed;participants voiced agreement with care plan;participants included;patient   OT Total Evaluation Time   OT Eval, Low Complexity Minutes (04517) 8   OT Goals   Therapy Frequency (OT) Daily   OT Predicted Duration/Target Date for Goal Attainment 11/23/23   OT Goals Lower Body Dressing;Hygiene/Grooming;Transfers;Toilet Transfer/Toileting;OT Goal 1;OT Goal 2   OT:  Hygiene/Grooming modified independent;while standing   OT: Lower Body Dressing Supervision/stand-by assist;using adaptive equipment   OT: Transfer Supervision/stand-by assist  (step in shower transfer)   OT: Toilet Transfer/Toileting Modified independent;toilet transfer;cleaning and garment management;using adaptive equipment   OT: Goal 1 Pt will verbalize understanding of recommended vehicle transfer technique   OT: Goal 2 Pt will verbalize understanding of pain management strategies, how to progress activity tolerance, fall prevention   Interventions   Interventions Quick Adds Self-Care/Home Management   Self-Care/Home Management   Self-Care/Home Mgmt/ADL, Compensatory, Meal Prep Minutes (83669) 40   Symptoms Noted During/After Treatment (Meal Preparation/Planning Training) fatigue;increased pain   Treatment Detail/Skilled Intervention Upon arrival pt agreeable to therapy. Pt participated in lengthy education regarding fall prevention, pain management strategies, how to progress activity tolerance, vehicle transfer technique. Pt participated in education regarding benefits of reacher, sock aid, long bath sponge, shower chair, grab bars, commode or RTS with handles. Pt issued adaptive equipment handout and participated in education regarding where to obtain. Sit to stand from recliner with FWW with Sherrie and  max cues for technique. Pt requires increased time and effort for mobility due to pain. Pt participated in education and demonstration regarding step in shower transfer. Pt returned demonstration with Sherrie and cues. Pt doffed/donned socks using sock aid and reacher with max cues and donned undergarments using reacher with max cues. Problem solving impairments noted. During session pt ambulated with FWW with CGA for balance safety. Pt left seated in recliner with alarm on, needed supplies, ice pack.   OT Discharge Planning   OT Plan G/H standing. LB dressing. Toileting. Shower transfer   OT Discharge  Recommendation (DC Rec) home with home care occupational therapy;Leaving home requires significant assistance;Leaving home requires significant taxing effort   OT Rationale for DC Rec Anticipate that pt will progress to discharge home. Currently pt needing Sherrie overall with self-cares. Pt would benefit from another night in hospital to progress independence as his spouse and daughter cannot provide physical assist. Recommend home OT to progress pt's safety and independence. If pt progresses slower than anticipated, recommend TCU.   OT Brief overview of current status Sherrie LB dressing, toileting, shower transfer. Needs cues for walker safety. Reduced insight into deficits   OT Equipment Needed at Discharge shower chair  (Raised toilet seat with handles or over the toilet commode. Sock aid. Grab bars. Long handled bath sponge)   Total Session Time   Timed Code Treatment Minutes 40   Total Session Time (sum of timed and untimed services) 48

## 2023-11-18 NOTE — PROGRESS NOTES
"[facility]    Medicine Progress Note - Hospitalist Service    Date of Admission:  11/16/2023    Assessment & Plan     John is an 78-year-old who presented on 11/16 after ground-level fall while playing ping-pong.  He had immediate left hip pain and was unable to bear weight, imaging demonstrated a left displaced femoral neck fracture.  Status post HELGA 11/17.    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  -Hemoglobin recheck in a.m.    History cryptogenic CVA  On Plavix, continue    Hypertension  Will likely continue lisinopril in the morning dependent on BMP  -BMP in a.m.       Diet: Advance Diet as Tolerated: Regular Diet Adult    DVT Prophylaxis: Pneumatic Compression Devices  Sarah Catheter: Not present  Lines: None     Cardiac Monitoring: None  Code Status: Full Code      Clinically Significant Risk Factors                  # Hypertension: Noted on problem list        # Overweight: Estimated body mass index is 27.52 kg/m  as calculated from the following:    Height as of this encounter: 1.803 m (5' 11\").    Weight as of this encounter: 89.5 kg (197 lb 5 oz)., PRESENT ON ADMISSION            Disposition Plan      Expected Discharge Date: 11/19/2023                  Ashwini Comer MD  Hospitalist Service  [facility]  Securely message with [employer] (more info)  Text page via [employer] Paging/Directory   ______________________________________________________________________    Interval History   Status post HELGA today, doing well and is hungry.  Pain is controlled.    Physical Exam   Vital Signs: Temp: 97.4  F (36.3  C) Temp src: Temporal BP: 101/69 Pulse: 71   Resp: 16 SpO2: 99 % O2 Device: Nasal cannula Oxygen Delivery: 2 LPM  Weight: 197 lbs 4.99 oz    General: Very pleasant male resting " comfortably in hospital bed.  Awake, alert, interactive.  HEENT: Sclerae anicteric.  Mucous membranes moist.  Cardiac: Regular rate and rhythm without murmur, gallop, or rub.  No peripheral edema.  Respiratory: Normal work of breathing.  Musculoskeletal: Moving all extremities appropriately.  Skin: Scattered ecchymosis, left elbow skin tear  Psychologic: Appropriate mood and affect.      Medical Decision Making       40 MINUTES SPENT BY ME on the date of service doing chart review, history, exam, documentation & further activities per the note.      Data     I have personally reviewed the following data over the past 24 hrs:    10.5  \   14.7   / 196     141 106 21.0 /  127 (H)   4.9 24 1.03 \     INR:  0.91 PTT:  N/A   D-dimer:  N/A Fibrinogen:  N/A       Imaging results reviewed over the past 24 hrs:   Recent Results (from the past 24 hour(s))   XR Hip Port Left 1 View    Narrative    This exam was marked as non-reportable because it will not be read by a   radiologist or a [place] non-radiologist provider.         XR Pelvis w Hip Left 1 View    Narrative    XR PELVIS AND HIP LEFT 1 VIEW 11/17/2023 2:08 PM    HISTORY: Status post Hip surgery    COMPARISON: None.      Impression    IMPRESSION: Postop changes of a recent left total hip arthroplasty. No  fracture. Moderate degenerative changes right hip. Osteopenia.    CÉSAR HINTON MD         SYSTEM ID:  [id number]

## 2023-11-18 NOTE — CONSULTS
Care Management Initial Consult    General Information  Assessment completed with: Patient,    Type of CM/SW Visit: Initial Assessment    Primary Care Provider verified and updated as needed:     Readmission within the last 30 days: no previous admission in last 30 days      Reason for Consult: discharge planning  Advance Care Planning:            Communication Assessment  Patient's communication style: spoken language (English or Bilingual)    Hearing Difficulty or Deaf: no   Wear Glasses or Blind: yes    Cognitive  Cognitive/Neuro/Behavioral: WDL                      Living Environment:   People in home: spouse     Current living Arrangements: house      Able to return to prior arrangements: yes  Living Arrangement Comments: pt has installed stair lifts in the home due to wife's mobiltiy needs    Family/Social Support:  Care provided by: self  Provides care for: spouse  Marital Status:   Wife         Description of Support System: Supportive, Involved    Support Assessment: Adequate family and caregiver support, Adequate social supports    Current Resources:   Patient receiving home care services: No     Community Resources: None  Equipment currently used at home: none (Step in shower. Comfort height toilet. Has access to reacher.)  Supplies currently used at home: None    Anticipate pt will need a walker issued for discharge support and will need family to  shower chair    Employment/Financial:  Employment Status: retired        Financial Concerns: none   Referral to Financial Worker: No       Does the patient's insurance plan have a 3 day qualifying hospital stay waiver?  No    Lifestyle & Psychosocial Needs:  Social Determinants of Health     Food Insecurity: Not on file   Depression: Not at risk (4/19/2022)    PHQ-2     PHQ-2 Score: 0   Housing Stability: Not on file   Tobacco Use: Low Risk  (11/17/2023)    Patient History     Smoking Tobacco Use: Never     Smokeless Tobacco Use: Never      Passive Exposure: Not on file   Financial Resource Strain: Not on file   Alcohol Use: Not on file   Transportation Needs: Not on file   Physical Activity: Not on file   Interpersonal Safety: Not on file   Stress: Not on file   Social Connections: Not on file       Functional Status:  Prior to admission patient needed assistance:   Dependent ADLs:: Independent  Dependent IADLs:: Independent  Assesssment of Functional Status: Not at baseline with ADL Functioning, Not at baseline with mobility, Not at  functional baseline    Mental Health Status:  Mental Health Status: No Current Concerns       Chemical Dependency Status:  Chemical Dependency Status: No Current Concerns             Values/Beliefs:  Spiritual, Cultural Beliefs, Buddhism Practices, Values that affect care:                 Additional Information:  Met with pt to discuss role and rehab recommendations. Pt stated the MD told him it was would be his choice on what he needs. SW updated with his health plan that they would need recommendations from Rehab staff.. Currently pt is able to ambulate 80 ft with rehab staff with anticipated recommendations for home with home PT.  Pt has not needed this support in the past. Spoke with pt that we would send a referral to our intake home care and they would begin looking for home care agency that will accept based on Health plan contact and location for staffing.  Pt is aware and agreeable at this time. He did request a disability parking permit. Provided him with paper work for him to fill out and explained the surgeon of his PA will need to complete the bottom half for him or family to take to the [employer].    Will follow up with pt once a home care has been obtained. Will send referral to PCP clinic for hospital follow up. Pt today anticipates he will go home with family /car  JORGE ALBERTO did speak with Azam about W/C transport and the out of pocket cost for this option. JORGE ALBERTO asked he keep in mind the issus of limited mobility and  assist his wife can offer as well as the limitation of his right shoulder.  Pt told RN staff that if needed he would sit on the step with his bottom and scoot up the 4 steps into his home setting.     Corinne White, LSW  In patient Care Mgt team   Weekend # [phone]

## 2023-11-18 NOTE — PLAN OF CARE
Goal Outcome Evaluation:      Plan of Care Reviewed With: patient    Overall Patient Progress: improvingOverall Patient Progress: improving    3pm-11pm RN    Patient VS are at baseline: No is on 1L of O2 per NC  Patient able to ambulate as they were prior to admission or with assist devices provided by therapy during their stay: No walked with assist of two using a walker and gait belt in the room  Patient must void prior to discharge: Yes  Patient able to tolerate oral intake: Yes  Patient has adequate pain control using oral analgesics: Yes    Patient denies pain rating it at 0-1, scheduled tylenol used for pain management, Voided in the urinal, up and walked in the room with assist of 2 using a walker and gait belt. CMS intact, dressing CDI, PT and OT scheduled for tomorrow.

## 2023-11-18 NOTE — PROGRESS NOTES
"[facility]    Medicine Progress Note - Hospitalist Service    Date of Admission:  11/16/2023    Assessment & Plan     John is an 78-year-old who presented on 11/16 after ground-level fall while playing ping-pong.  He had immediate left hip pain and was unable to bear weight, imaging demonstrated a left displaced femoral neck fracture.  Status post HELGA 11/17.    Left displaced femoral neck fracture  S/p fall, mechanical  Status post left total hip arthroplasty 11/17  -switched oxy to tramadol 11/18, as he seemed to have agitation with oxy, and where he was previously ambulating halls with assistance/walker, is now trying to walk without it and take off gait belt, unsteady. Will give one time dose haldol  - PT evaluated, currently able to ambulate with recommendations for likely home with home PT, SW assisting with referrals to home care agencies  -OT evaluated, anticipate progress to discharge home with home care OT.   -Anticipate discharge in next 1-2 days    L shoulder sprain  No e/o acute fracture, mild L glenohumeral and AC degenerative arthrosis    ABLA  -Hemoglobin recheck in a.m.    History cryptogenic CVA  On Plavix, held postop, Resume today    Hypertension  Will likely continue lisinopril in the morning dependent on BMP  -resume lisinopril       Diet: Advance Diet as Tolerated: Regular Diet Adult    DVT Prophylaxis: Pneumatic Compression Devices  Sarah Catheter: Not present  Lines: None     Cardiac Monitoring: None  Code Status: Full Code      Clinically Significant Risk Factors                  # Hypertension: Noted on problem list        # Overweight: Estimated body mass index is 28.68 kg/m  as calculated from the following:    Height as of this encounter: 1.803 m (5' 11\").    Weight as of this encounter: 93.3 kg (205 lb 9.6 oz)., PRESENT ON ADMISSION            Disposition Plan     Expected Discharge Date: 11/19/2023                  Ashwini Comer MD  Hospitalist Service  " [facility]  Securely message with [employer] (more info)  Text page via [employer] Paging/Directory   ______________________________________________________________________    Interval History   VSS, doing well, PT and OT went well this AM    Physical Exam   Vital Signs: Temp: 97.1  F (36.2  C) Temp src: Temporal BP: 110/65 Pulse: 73   Resp: 19 SpO2: 100 % O2 Device: None (Room air) Oxygen Delivery: 1 LPM  Weight: 205 lbs 9.6 oz    General: Very pleasant male, Awake, alert, interactive.  HEENT: Sclerae anicteric.  Mucous membranes moist.  Cardiac: Regular rate and rhythm without murmur, gallop, or rub.   Respiratory: Normal work of breathing.  Musculoskeletal: Moving all extremities appropriately.  Skin: Scattered ecchymosis, left elbow skin tear  Psychologic: Appropriate mood and affect.      Medical Decision Making       45 MINUTES SPENT BY ME on the date of service doing chart review, history, exam, documentation & further activities per the note.        Data     I have personally reviewed the following data over the past 24 hrs:    N/A  \   10.5 (L)   / N/A     135 102 27.6 (H) /  104 (H)   4.5 25 1.17 \       Imaging results reviewed over the past 24 hrs:   Recent Results (from the past 24 hour(s))   XR Pelvis w Hip Left 1 View    Narrative    XR PELVIS AND HIP LEFT 1 VIEW 11/17/2023 2:08 PM    HISTORY: Status post Hip surgery    COMPARISON: None.      Impression    IMPRESSION: Postop changes of a recent left total hip arthroplasty. No  fracture. Moderate degenerative changes right hip. Osteopenia.    CÉSAR HINTON MD         SYSTEM ID:  [id number]

## 2023-11-19 ENCOUNTER — APPOINTMENT (OUTPATIENT)
Dept: PHYSICAL THERAPY | Facility: CLINIC | Age: 78
DRG: 522 | End: 2023-11-19
Payer: COMMERCIAL

## 2023-11-19 ENCOUNTER — APPOINTMENT (OUTPATIENT)
Dept: OCCUPATIONAL THERAPY | Facility: CLINIC | Age: 78
DRG: 522 | End: 2023-11-19
Payer: COMMERCIAL

## 2023-11-19 LAB
ANION GAP SERPL CALCULATED.3IONS-SCNC: 5 MMOL/L (ref 7–15)
BUN SERPL-MCNC: 23.2 MG/DL (ref 8–23)
CALCIUM SERPL-MCNC: 8 MG/DL (ref 8.8–10.2)
CHLORIDE SERPL-SCNC: 105 MMOL/L (ref 98–107)
CREAT SERPL-MCNC: 1.06 MG/DL (ref 0.67–1.17)
DEPRECATED HCO3 PLAS-SCNC: 28 MMOL/L (ref 22–29)
EGFRCR SERPLBLD CKD-EPI 2021: 71 ML/MIN/1.73M2
GLUCOSE BLDC GLUCOMTR-MCNC: 101 MG/DL (ref 70–99)
GLUCOSE SERPL-MCNC: 102 MG/DL (ref 70–99)
HGB BLD-MCNC: 9.6 G/DL (ref 13.3–17.7)
MAGNESIUM SERPL-MCNC: 2.2 MG/DL (ref 1.7–2.3)
POTASSIUM SERPL-SCNC: 4.2 MMOL/L (ref 3.4–5.3)
SODIUM SERPL-SCNC: 138 MMOL/L (ref 135–145)

## 2023-11-19 PROCEDURE — 97116 GAIT TRAINING THERAPY: CPT | Mod: GP

## 2023-11-19 PROCEDURE — 99232 SBSQ HOSP IP/OBS MODERATE 35: CPT | Performed by: STUDENT IN AN ORGANIZED HEALTH CARE EDUCATION/TRAINING PROGRAM

## 2023-11-19 PROCEDURE — 83735 ASSAY OF MAGNESIUM: CPT | Performed by: STUDENT IN AN ORGANIZED HEALTH CARE EDUCATION/TRAINING PROGRAM

## 2023-11-19 PROCEDURE — 80048 BASIC METABOLIC PNL TOTAL CA: CPT | Performed by: STUDENT IN AN ORGANIZED HEALTH CARE EDUCATION/TRAINING PROGRAM

## 2023-11-19 PROCEDURE — 97110 THERAPEUTIC EXERCISES: CPT | Mod: GP

## 2023-11-19 PROCEDURE — 36415 COLL VENOUS BLD VENIPUNCTURE: CPT | Performed by: STUDENT IN AN ORGANIZED HEALTH CARE EDUCATION/TRAINING PROGRAM

## 2023-11-19 PROCEDURE — 120N000001 HC R&B MED SURG/OB

## 2023-11-19 PROCEDURE — 97535 SELF CARE MNGMENT TRAINING: CPT | Mod: GO

## 2023-11-19 PROCEDURE — 250N000013 HC RX MED GY IP 250 OP 250 PS 637: Performed by: STUDENT IN AN ORGANIZED HEALTH CARE EDUCATION/TRAINING PROGRAM

## 2023-11-19 PROCEDURE — 250N000013 HC RX MED GY IP 250 OP 250 PS 637: Performed by: PHYSICIAN ASSISTANT

## 2023-11-19 PROCEDURE — 85018 HEMOGLOBIN: CPT | Performed by: PHYSICIAN ASSISTANT

## 2023-11-19 PROCEDURE — 250N000013 HC RX MED GY IP 250 OP 250 PS 637: Performed by: NURSE PRACTITIONER

## 2023-11-19 RX ORDER — CELECOXIB 200 MG/1
200 CAPSULE ORAL DAILY
Qty: 14 CAPSULE | Refills: 0 | Status: SHIPPED | OUTPATIENT
Start: 2023-11-19 | End: 2023-11-19

## 2023-11-19 RX ORDER — AMOXICILLIN 250 MG
1 CAPSULE ORAL 2 TIMES DAILY PRN
Qty: 30 TABLET | Refills: 0 | Status: SHIPPED | OUTPATIENT
Start: 2023-11-19 | End: 2023-11-19

## 2023-11-19 RX ORDER — ACETAMINOPHEN 325 MG/1
975 TABLET ORAL EVERY 8 HOURS
DISCHARGE
Start: 2023-11-19

## 2023-11-19 RX ORDER — OXYCODONE HYDROCHLORIDE 5 MG/1
5 TABLET ORAL EVERY 6 HOURS PRN
Qty: 12 TABLET | Refills: 0 | Status: SHIPPED | OUTPATIENT
Start: 2023-11-19 | End: 2023-11-22

## 2023-11-19 RX ORDER — AMOXICILLIN 250 MG
1 CAPSULE ORAL 2 TIMES DAILY PRN
DISCHARGE
Start: 2023-11-19

## 2023-11-19 RX ORDER — ACETAMINOPHEN 325 MG/1
975 TABLET ORAL EVERY 8 HOURS
Qty: 100 TABLET | Refills: 0 | Status: SHIPPED | OUTPATIENT
Start: 2023-11-19 | End: 2023-11-19

## 2023-11-19 RX ORDER — QUETIAPINE FUMARATE 25 MG/1
25 TABLET, FILM COATED ORAL 2 TIMES DAILY
Status: DISCONTINUED | OUTPATIENT
Start: 2023-11-19 | End: 2023-11-21 | Stop reason: HOSPADM

## 2023-11-19 RX ORDER — CELECOXIB 200 MG/1
200 CAPSULE ORAL DAILY
DISCHARGE
Start: 2023-11-19

## 2023-11-19 RX ORDER — QUETIAPINE FUMARATE 25 MG/1
25 TABLET, FILM COATED ORAL 2 TIMES DAILY PRN
Status: DISCONTINUED | OUTPATIENT
Start: 2023-11-19 | End: 2023-11-19

## 2023-11-19 RX ADMIN — METHOCARBAMOL 500 MG: 500 TABLET ORAL at 21:22

## 2023-11-19 RX ADMIN — ACETAMINOPHEN 975 MG: 325 TABLET, FILM COATED ORAL at 16:46

## 2023-11-19 RX ADMIN — SENNOSIDES AND DOCUSATE SODIUM 1 TABLET: 8.6; 5 TABLET ORAL at 08:06

## 2023-11-19 RX ADMIN — SENNOSIDES AND DOCUSATE SODIUM 1 TABLET: 8.6; 5 TABLET ORAL at 21:20

## 2023-11-19 RX ADMIN — METHOCARBAMOL 500 MG: 500 TABLET ORAL at 00:00

## 2023-11-19 RX ADMIN — QUETIAPINE FUMARATE 25 MG: 25 TABLET ORAL at 21:20

## 2023-11-19 RX ADMIN — HYDROXYZINE HYDROCHLORIDE 10 MG: 10 TABLET ORAL at 08:05

## 2023-11-19 RX ADMIN — ATORVASTATIN CALCIUM 20 MG: 20 TABLET, FILM COATED ORAL at 08:06

## 2023-11-19 RX ADMIN — HYDROXYZINE HYDROCHLORIDE 10 MG: 10 TABLET ORAL at 16:47

## 2023-11-19 RX ADMIN — MULTIPLE VITAMINS W/ MINERALS TAB 1 TABLET: TAB at 08:05

## 2023-11-19 RX ADMIN — FAMOTIDINE 20 MG: 20 TABLET ORAL at 08:05

## 2023-11-19 RX ADMIN — ACETAMINOPHEN 975 MG: 325 TABLET, FILM COATED ORAL at 08:05

## 2023-11-19 RX ADMIN — METHOCARBAMOL 500 MG: 500 TABLET ORAL at 13:21

## 2023-11-19 RX ADMIN — QUETIAPINE FUMARATE 25 MG: 25 TABLET ORAL at 13:21

## 2023-11-19 RX ADMIN — CLOPIDOGREL BISULFATE 75 MG: 75 TABLET ORAL at 08:06

## 2023-11-19 RX ADMIN — ACETAMINOPHEN 975 MG: 325 TABLET, FILM COATED ORAL at 00:00

## 2023-11-19 RX ADMIN — FAMOTIDINE 20 MG: 20 TABLET ORAL at 21:20

## 2023-11-19 ASSESSMENT — ACTIVITIES OF DAILY LIVING (ADL)
ADLS_ACUITY_SCORE: 30
ADLS_ACUITY_SCORE: 29
ADLS_ACUITY_SCORE: 30

## 2023-11-19 NOTE — PROGRESS NOTES
Care Management Follow Up    Length of Stay (days): 3    Expected Discharge Date: 11/19/2023     Concerns to be Addressed: discharge planning     Patient plan of care discussed at interdisciplinary rounds: Yes    Anticipated Discharge Disposition: rehab staff updated that they have changed recommendation for TRANSITIONAL CARE UNIT rather than home with home care support .,   Anticipated Discharge Services: PT/OT  Anticipated Discharge DME: Walker Shower Chair    Patient/family educated on Medicare website which has current facility and service quality ratings: no  Education Provided on the Discharge Plan:  yes  Patient/Family in Agreement with the Plan:  yes    Referrals Placed by CM/SW:  No  Private pay costs discussed:  No    Additional Information:  Met with pt to update [facility] has accepted referral  After leaving pt room. Rehab staff and RN staff spoke with Sw that the new recommendation from Rehab is now for TRANSITIONAL CARE UNIT    Returned to room to speak with pt about recommendations. Azam is disappointed, he wants to eat his breakfast and will look at TRANSITIONAL CARE UNIT list for possible options.     Will return later to discuss.    Corinne White, LSW  In patient Care Mgt team     Corinne C. White, LSW      Addendum:    Met with pt and his daughter in the room, called SO to be involved in discharge discussion    All agree for TRANSITIONAL CARE UNIT placement with the hope we can keep [facility]  [facility]    BOth pt and family today did agree to WC transport once location has been obtained. Discussed cost of transport.   Provided education to pt that TRANSITIONAL CARE UNIT facility will need to be in network for his health plan and may need to obtain Prior auth    Referrals sent    Corinne White, LSW  In patient Care Mgt team   [phone]      Pt has been accepted for TRANSITIONAL CARE UNIT for tomorrow at  [facility]//  W/C transport set up for [phone]    Will update team and complete PASS    .cwh

## 2023-11-19 NOTE — PROGRESS NOTES
PROGRESS NOTE    SUBJECTIVE  John Riley is status post left total hip arthroplasty for femoral neck fracture on November 17, 2023. No acute events overnight. Afebrile with stable vitals.  He reports his pain is well controlled.  He is doing well with physical therapy.  He will likely discharge home today versus tomorrow pending approval from physical therapy    PHYSICAL EXAM  General: No acute distress. Alert and oriented to person, time and place.  Pulmonary: Breathing comfortably on room air.   Cardiac: Bilateral PT pulses are equal and symmetric. Regular rate and rhythm.   Musculoskeletal: Postoperative dressing is clean and dry. No erythema surrounding dressing. Patient demonstrates active range of motion of the operative hip.   Neurological: Patient is able to fire quadriceps, tibialis anterior, extensor hallucis longus and gastrocnemius. Sensation intact to light touch in bilateral L3-S1 distributions.     IMAGING  Post-operative radiographs were reviewed and demonstrate components in good position. No evidence of fracture or dislocation.     ASSESSMENT/PLAN  #1 Status post left total hip arthroplasty for femoral neck fracture on November 17, 2023    John Riley is status post the above procedure.  He is progressing well.  He will work with physical therapy again.  He anticipates discharging home.    Activity: Weightbearing as tolerated. Activity as tolerated. No precautions. Continue to work with PT while in hospital.   Antibiotics: Weight-based Ancef x2 doses postoperatively or until discharge.  Cultures: None  Diet: Advance as tolerated. Goal diet: General.   Dressing: Patient is able to shower. Patient will keep dressing in place until post-op day 7.  DVT Prophylaxis: Plavix  Pain medication: Multimodal pain regimen with ice, elevation, Tylenol 1000mg Q6H scheduled, Celebrex 200mg BID, Oxycodone 5-10mg Q4 PRN    Disposition: Stable and improving. Discharge home when patient has cleared PT

## 2023-11-19 NOTE — PLAN OF CARE
Goal Outcome Evaluation:      Plan of Care Reviewed With: patient, child    Overall Patient Progress: improvingOverall Patient Progress: improving       A&O x4, pt no longer confused. Assist x1 with walker and GB. VSS, held lisinopril for SBP in 90s. Scheduled Tylenol, PRN atarax and robaxin given with relief. Ice applied. Small amount of dried drainage on dressing to L hip. CMS intact. Mg and K protocols, no replacement needed. Seroquel started today. After significant encouragement, pt ate a late breakfast with good appetite. Voiding. Rt calf smaller than left calf at baseline. Significant bruising under left arm. Pt is unable to lift arm, Dr. Comer aware. PT will see patient again this afternoon. Daughter at bedside this afternoon. Pt up in chair all day. Plan is TCU pending placement.

## 2023-11-19 NOTE — PROGRESS NOTES
"[facility]    Medicine Progress Note - Hospitalist Service    Date of Admission:  11/16/2023    Assessment & Plan     John is an 78-year-old who presented on 11/16 after ground-level fall while playing ping-pong.  He had immediate left hip pain and was unable to bear weight, imaging demonstrated a left displaced femoral neck fracture.  Status post HELGA 11/17.    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  He is not really needing much pain management other than Tylenol but will have as needed Toradol available.  Started Seroquel 25 mg twice daily for agitation.  - PT evaluated, currently able to ambulate with recommendations for TCU    L shoulder sprain, concern for rotator cuff injury.    No e/o acute fracture, mild L glenohumeral and AC degenerative arthrosis.  unable to raise his left arm, is able to move passively without significant pain.  -Ortho following    ABLA  Secondary to surgery, stable    History cryptogenic CVA  Plavix    Hypertension  Continue lisinopril       Diet: Advance Diet as Tolerated: Regular Diet Adult    DVT Prophylaxis: Pneumatic Compression Devices  Sarah Catheter: Not present  Lines: None     Cardiac Monitoring: None  Code Status: Full Code      Clinically Significant Risk Factors                  # Hypertension: Noted on problem list        # Overweight: Estimated body mass index is 28.68 kg/m  as calculated from the following:    Height as of this encounter: 1.803 m (5' 11\").    Weight as of this encounter: 93.3 kg (205 lb 9.6 oz)., PRESENT ON ADMISSION     # Financial/Environmental Concerns: none         Disposition Plan     Expected Discharge Date: 11/19/2023,  3:00 PM    Destination: home with family;home with help/services            Ashwini" MD Souleymane  Hospitalist Service  [facility]  Securely message with [employer] (more info)  Text page via [employer] Paging/Directory   ______________________________________________________________________    Interval History   VSS, doing better today, did have several episodes last night of agitation, trying to get up without assistance, throwing his walker at staff and taking off his gait belt.  Currently doing okay, denies pain at rest.    Physical Exam   Vital Signs: Temp: 96.9  F (36.1  C) Temp src: Temporal BP: 98/64 Pulse: 73   Resp: 18 SpO2: 99 % O2 Device: None (Room air)    Weight: 205 lbs 9.6 oz    General: Very pleasant male, Awake, alert, interactive.  HEENT: Sclerae anicteric.  Mucous membranes moist.  Cardiac: Regular rate and rhythm without murmur, gallop, or rub.   Respiratory: Normal work of breathing.  Musculoskeletal: Unable to actively extend left arm, range of motion with passive extension and flexion intact.  Pain with palpation of lateral aspect of deltoid.  Psychologic: Appropriate mood and affect.      Medical Decision Making       45 MINUTES SPENT BY ME on the date of service doing chart review, history, exam, documentation & further activities per the note.        Data     I have personally reviewed the following data over the past 24 hrs:    N/A  \   9.6 (L)   / N/A     138 105 23.2 (H) /  102 (H)   4.2 28 1.06 \       Imaging results reviewed over the past 24 hrs:   No results found for this or any previous visit (from the past 24 hour(s)).

## 2023-11-19 NOTE — PLAN OF CARE
Goal Outcome Evaluation:      Plan of Care Reviewed With: patient    Overall Patient Progress: improvingOverall Patient Progress: improving    3pm-11pm RN    Patient VS are at baseline: Yes  Patient able to ambulate as they were prior to admission or with assist devices provided by therapy during their stay: Yes with assist of one using walker and gait belt  Patient must void prior to discharge: Yes  Patient able to tolerate oral intake: Yes  Patient has adequate pain control using oral analgesics: Yes    Patient was very agitated and restless this evening, Kept getting up from the chair and setting off bed alarm almost fell. Was very hard to redirect and would strike out at staff. Refusing to walk the walker throwing it on the floor and gait belt at staff. Obtain orders for haldol which was given patient had refused to take tramadol only taking tylenol but after a lot of redirection and convincing he finally took tramadol. Obtained orders for additional IV haldol and finally patient agreed to go to bed and slept for 2hrs. Sitter at the bedside. Dressing CDI, CMS intact. Plan is to for possible discharge to home with home care services.

## 2023-11-19 NOTE — PLAN OF CARE
Goal Outcome Evaluation:       Patient vital signs are at baseline: Yes  Patient able to ambulate as they were prior to admission or with assist devices provided by therapies during their stay:  Yes  Patient MUST void prior to discharge:  Yes  Patient able to tolerate oral intake:  Yes  Pain has adequate pain control using Oral analgesics:  Yes  Does patient have an identified :  Yes  Has goal D/C date and time been discussed with patient:  Yes        Pt alert and oriented x 4 but forgetful., VSS in Ra. Pain given PRN Robaxin and scheduled tylenol. Voiding via  bathroom assist of 1, GB+walker. Dressing small  brown drainage. CMS intact.     Pt was cooperative and at ease when shift started. Around 02:00 pt tries to get out of the bed to use bathroom without calling. Becomes agitated, takes off gown and refuses to go back to bed after bathroom use. After conversation with the nurse Pt calmed down and sat on the recliner alarm is on. Pt educated not to OOB without assistance and to use call light.       Blood pressure 129/68, pulse 75, temperature 98.1  F (36.7  C), temperature source Temporal, resp. rate 17, SpO2 93%.

## 2023-11-20 ENCOUNTER — APPOINTMENT (OUTPATIENT)
Dept: PHYSICAL THERAPY | Facility: CLINIC | Age: 78
DRG: 522 | End: 2023-11-20
Payer: COMMERCIAL

## 2023-11-20 ENCOUNTER — APPOINTMENT (OUTPATIENT)
Dept: MRI IMAGING | Facility: CLINIC | Age: 78
DRG: 522 | End: 2023-11-20
Attending: PHYSICIAN ASSISTANT
Payer: COMMERCIAL

## 2023-11-20 LAB
MAGNESIUM SERPL-MCNC: 2.2 MG/DL (ref 1.7–2.3)
PATH REPORT.COMMENTS IMP SPEC: NORMAL
PATH REPORT.COMMENTS IMP SPEC: NORMAL
PATH REPORT.FINAL DX SPEC: NORMAL
PATH REPORT.GROSS SPEC: NORMAL
PATH REPORT.MICROSCOPIC SPEC OTHER STN: NORMAL
PATH REPORT.RELEVANT HX SPEC: NORMAL
PHOTO IMAGE: NORMAL
POTASSIUM SERPL-SCNC: 4.3 MMOL/L (ref 3.4–5.3)

## 2023-11-20 PROCEDURE — 97110 THERAPEUTIC EXERCISES: CPT | Mod: GP

## 2023-11-20 PROCEDURE — 73221 MRI JOINT UPR EXTREM W/O DYE: CPT | Mod: LT

## 2023-11-20 PROCEDURE — 120N000001 HC R&B MED SURG/OB

## 2023-11-20 PROCEDURE — 99207 PR NO BILLABLE SERVICE THIS VISIT: CPT | Performed by: STUDENT IN AN ORGANIZED HEALTH CARE EDUCATION/TRAINING PROGRAM

## 2023-11-20 PROCEDURE — 83735 ASSAY OF MAGNESIUM: CPT | Performed by: STUDENT IN AN ORGANIZED HEALTH CARE EDUCATION/TRAINING PROGRAM

## 2023-11-20 PROCEDURE — 99232 SBSQ HOSP IP/OBS MODERATE 35: CPT | Performed by: STUDENT IN AN ORGANIZED HEALTH CARE EDUCATION/TRAINING PROGRAM

## 2023-11-20 PROCEDURE — 250N000013 HC RX MED GY IP 250 OP 250 PS 637: Performed by: PHYSICIAN ASSISTANT

## 2023-11-20 PROCEDURE — 250N000013 HC RX MED GY IP 250 OP 250 PS 637: Performed by: NURSE PRACTITIONER

## 2023-11-20 PROCEDURE — 97116 GAIT TRAINING THERAPY: CPT | Mod: GP

## 2023-11-20 PROCEDURE — 36415 COLL VENOUS BLD VENIPUNCTURE: CPT | Performed by: STUDENT IN AN ORGANIZED HEALTH CARE EDUCATION/TRAINING PROGRAM

## 2023-11-20 PROCEDURE — 84132 ASSAY OF SERUM POTASSIUM: CPT | Performed by: STUDENT IN AN ORGANIZED HEALTH CARE EDUCATION/TRAINING PROGRAM

## 2023-11-20 PROCEDURE — 250N000013 HC RX MED GY IP 250 OP 250 PS 637: Performed by: STUDENT IN AN ORGANIZED HEALTH CARE EDUCATION/TRAINING PROGRAM

## 2023-11-20 RX ORDER — DIAZEPAM 5 MG
5 TABLET ORAL EVERY 6 HOURS PRN
Status: DISCONTINUED | OUTPATIENT
Start: 2023-11-20 | End: 2023-11-21 | Stop reason: HOSPADM

## 2023-11-20 RX ORDER — CALCIUM CARBONATE 500 MG/1
1 TABLET, CHEWABLE ORAL 4 TIMES DAILY PRN
DISCHARGE
Start: 2023-11-20

## 2023-11-20 RX ORDER — FAMOTIDINE 20 MG/1
20 TABLET, FILM COATED ORAL 2 TIMES DAILY
DISCHARGE
Start: 2023-11-20

## 2023-11-20 RX ORDER — QUETIAPINE FUMARATE 25 MG/1
25 TABLET, FILM COATED ORAL 2 TIMES DAILY
DISCHARGE
Start: 2023-11-20 | End: 2023-11-22

## 2023-11-20 RX ORDER — POLYETHYLENE GLYCOL 3350 17 G/17G
17 POWDER, FOR SOLUTION ORAL DAILY
DISCHARGE
Start: 2023-11-20

## 2023-11-20 RX ORDER — BISACODYL 10 MG
10 SUPPOSITORY, RECTAL RECTAL DAILY PRN
DISCHARGE
Start: 2023-11-20

## 2023-11-20 RX ADMIN — HYDROXYZINE HYDROCHLORIDE 10 MG: 10 TABLET ORAL at 22:01

## 2023-11-20 RX ADMIN — SENNOSIDES AND DOCUSATE SODIUM 1 TABLET: 8.6; 5 TABLET ORAL at 22:01

## 2023-11-20 RX ADMIN — HYDROXYZINE HYDROCHLORIDE 10 MG: 10 TABLET ORAL at 08:56

## 2023-11-20 RX ADMIN — MULTIPLE VITAMINS W/ MINERALS TAB 1 TABLET: TAB at 08:57

## 2023-11-20 RX ADMIN — QUETIAPINE FUMARATE 25 MG: 25 TABLET ORAL at 08:56

## 2023-11-20 RX ADMIN — METHOCARBAMOL 500 MG: 500 TABLET ORAL at 13:06

## 2023-11-20 RX ADMIN — DIAZEPAM 5 MG: 5 TABLET ORAL at 20:01

## 2023-11-20 RX ADMIN — ATORVASTATIN CALCIUM 20 MG: 20 TABLET, FILM COATED ORAL at 08:57

## 2023-11-20 RX ADMIN — ACETAMINOPHEN 650 MG: 325 TABLET, FILM COATED ORAL at 22:01

## 2023-11-20 RX ADMIN — ACETAMINOPHEN 975 MG: 325 TABLET, FILM COATED ORAL at 08:57

## 2023-11-20 RX ADMIN — SENNOSIDES AND DOCUSATE SODIUM 1 TABLET: 8.6; 5 TABLET ORAL at 08:56

## 2023-11-20 RX ADMIN — FAMOTIDINE 20 MG: 20 TABLET ORAL at 08:56

## 2023-11-20 RX ADMIN — CLOPIDOGREL BISULFATE 75 MG: 75 TABLET ORAL at 08:56

## 2023-11-20 RX ADMIN — ACETAMINOPHEN 975 MG: 325 TABLET, FILM COATED ORAL at 00:51

## 2023-11-20 RX ADMIN — HYDROXYZINE HYDROCHLORIDE 10 MG: 10 TABLET ORAL at 15:13

## 2023-11-20 RX ADMIN — MAGNESIUM HYDROXIDE 30 ML: 400 SUSPENSION ORAL at 22:02

## 2023-11-20 RX ADMIN — METHOCARBAMOL 500 MG: 500 TABLET ORAL at 19:35

## 2023-11-20 RX ADMIN — QUETIAPINE FUMARATE 25 MG: 25 TABLET ORAL at 22:01

## 2023-11-20 RX ADMIN — POLYETHYLENE GLYCOL 3350 17 G: 17 POWDER, FOR SOLUTION ORAL at 09:06

## 2023-11-20 RX ADMIN — ACETAMINOPHEN 650 MG: 325 TABLET, FILM COATED ORAL at 17:18

## 2023-11-20 RX ADMIN — FAMOTIDINE 20 MG: 20 TABLET ORAL at 22:01

## 2023-11-20 RX ADMIN — LISINOPRIL 5 MG: 5 TABLET ORAL at 08:57

## 2023-11-20 ASSESSMENT — ACTIVITIES OF DAILY LIVING (ADL)
ADLS_ACUITY_SCORE: 30
ADLS_ACUITY_SCORE: 30
ADLS_ACUITY_SCORE: 29
ADLS_ACUITY_SCORE: 32
ADLS_ACUITY_SCORE: 29
ADLS_ACUITY_SCORE: 31
ADLS_ACUITY_SCORE: 31
ADLS_ACUITY_SCORE: 30
ADLS_ACUITY_SCORE: 31

## 2023-11-20 NOTE — PROGRESS NOTES
Orthopedic Surgery  John Riley  11/20/2023     Admit Date:  11/16/2023    POD: 3 Days Post-Op   Procedure(s):  Left total hip arthroplasty     Patient resting comfortably in bed.    Pain controlled in hip.  Reports he is not able to lift his left shoulder and has bruising along his arm.    Tolerating oral intake.    Denies nausea or vomiting  Denies chest pain or shortness of breath    Temp:  [96.3  F (35.7  C)-97.7  F (36.5  C)] 97.5  F (36.4  C)  Pulse:  [66-99] 74  Resp:  [18-20] 18  BP: (106-141)/(51-76) 141/75  SpO2:  [96 %-100 %] 96 %    Alert and oriented  Left HIP:   Dressing is saturated   Moderate left thigh swelling present.   Bilateral calves are soft, non-tender.  Left lower extremity is NVI.  Sensation intact bilateral lower extremities  Patient able to resist dorsi and plantar flexion bilaterally  +Dp pulse    Left SHOULDER:  Ecchymosis present along the left shoulder and biceps.   Tender to palpation at the anterior shoulder/proximal biceps tendon region  Patient is not able to actively flex the left shoulder  Denies pain with passive ROM: flexion and rotation  Sensation and pulses intact.   Able to range elbow, wrist and fingers without pain.     Labs:  Recent Labs   Lab Test 11/19/23  0652 11/18/23  0738 11/17/23  0606 11/16/23  1659 04/19/22  0729   WBC  --   --  10.5 10.5 5.4   HGB 9.6* 10.5* 14.7 14.6 15.9   PLT  --   --  196 195 221     Recent Labs   Lab Test 11/17/23  0606 12/10/16  1905   INR 0.91 0.95     Shoulder Xray:   1.  No acute fracture or joint malalignment.  2.  Mild left glenohumeral and acromioclavicular degenerative arthrosis.  3.  Old healed left posterior 8th rib fracture.    1. PLAN:  HIP:  Activity: Weightbearing as tolerated. Activity as tolerated. No precautions. Continue to work with PT while in hospital.   Cultures: None  Diet: General.   Dressing: Patient is able to shower. Patient will keep dressing in place until post-op day 7.  DVT Prophylaxis: Plavix  Pain  medication: Multimodal pain regimen with ice, elevation, Tylenol 1000mg Q6H scheduled, Celebrex 200mg BID, Oxycodone 5-10mg Q4 PRN    SHOULDER:  MRI left shoulder ordered.  Valium ordered for claustrophobia.   Sling ordered, wear prn pain.  Likely will need to be off for use of walker  Okay to WBAT through left shoulder  Ice prn      2. Disposition   Anticipate d/c to TCU possibly tomorrow.     Gian Hoang PA-C

## 2023-11-20 NOTE — PLAN OF CARE
Goal Outcome Evaluation:      Plan of Care Reviewed With: patient    Overall Patient Progress: improvingOverall Patient Progress: improving     A&O x4,  Assist x1 with walker and GB. VSS. Scheduled Tylenol, PRN atarax and robaxin given with relief. Ice applied. Aquacel saturated with serosanguinous drainage, new aquacel applied. CMS intact. Mg and K protocols, no replacement needed. Pt requires encouragement to eat. Voiding. Rt calf smaller than left calf at baseline. Significant bruising under left arm. Pt is unable to lift arm, L shoulder MRI ordered, arm sling for comfort. Plan is [facility].

## 2023-11-20 NOTE — DISCHARGE SUMMARY
"[facility]  Hospitalist Discharge Summary      Date of Admission:  11/16/2023  Date of Discharge:  11/20/2023  Discharging Provider: Ashwini Comer MD  Discharge Service: Hospitalist Service    Discharge Diagnoses   L displaced femoral neck fracture  Mechanical fall  L shoulder sprain, concern for rotator cuff injury  Acute blood loss anemia  History of cryptogenic CVA, R frontal CVA  HTN      Clinically Significant Risk Factors     # Overweight: Estimated body mass index is 28.68 kg/m  as calculated from the following:    Height as of this encounter: 1.803 m (5' 11\").    Weight as of this encounter: 93.3 kg (205 lb 9.6 oz).       Follow-ups Needed After Discharge   Follow-up Appointments     Follow Up and recommended labs and tests      Please call as soon as possible to make an appointment to be seen in Dr. Saad Estrada's clinic at 2 weeks postop for a recheck of your surgical   site, possible repeat x-rays, and wound care. If you are at a TCU at this   time and they have x-ray capabilities, you may complete your wound care   and x-rays at your TCU and have them send your images to Dr. Estrada's   office. Please call [phone] to schedule.     Dr. Estrada sees patients at 2 clinic locations:  [facility] [place]  [address] [place] [id number]    [facility] [place]  [address] [place], [place] [id number]      Please call the on-call phone number [phone] during evenings, nights   and weekends for any urgent needs. Prescription refills must be done   during business hours by calling Dr. Estrada's care team at [phone].          Follow up with ortho and pcp for shoulder evaluation      Discharge Disposition   Discharged to short-term care facility  Condition at discharge: Stable    Hospital Course   John is an 78-year-old who presented on 11/16 after ground-level fall while playing ping-pong.  He had immediate left hip pain and was unable to bear " weight, imaging demonstrated a left displaced femoral neck fracture.  Status post HELGA 11/17. Has also had L shoulder pain, with a XR negative for acute fracture or dislocation, but has been unable to actively raise his left arm but is able to tolerate passive range of motion, concern for rotator cuff pathology or tendinopathy.  Following his surgery he had agitation with oxycodone, this was discontinued and he has since not required additional pain management other than Tylenol.  Started Seroquel 25 mg twice daily, will likely be able to discontinue in coming days.  Resumed his Plavix for DVT prophylaxis post surgery as well as for his history of CVA.    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  He is not really needing much pain management other than Tylenol.  Started Seroquel 25 mg twice daily for agitation.  - PT evaluated, currently able to ambulate with recommendations for TCU    L shoulder sprain, concern for rotator cuff injury.    No e/o acute fracture, mild L glenohumeral and AC degenerative arthrosis.  unable to raise his left arm, is able to move passively without significant pain.  -Ortho follow-up    ABLA  Secondary to surgery, stable    History cryptogenic CVA  Plavix    Hypertension  Continue lisinopril    Consultations This Hospital Stay   ORTHOPEDIC SURGERY IP CONSULT  CARE MANAGEMENT / SOCIAL WORK IP CONSULT  PHYSICAL THERAPY ADULT IP CONSULT  OCCUPATIONAL THERAPY ADULT IP CONSULT  PHYSICAL THERAPY ADULT IP CONSULT  OCCUPATIONAL THERAPY ADULT IP CONSULT    Code Status   Full Code    Time Spent on this Encounter   IAshwini MD, personally saw the patient today and spent greater than 30 minutes discharging this patient.       Ashwini Comer MD  [facility]  [facility]  [address]  [facility] [address]  Phone: [phone]  Fax: [phone]  ______________________________________________________________________    Physical Exam   Vital Signs: Temp: 97.3  F (36.3  C) Temp src: Temporal BP: 118/73 Pulse: 66   Resp: 18 SpO2: 98 % O2 Device: None (Room air)    Weight: 205 lbs 9.6 oz         Primary Care Physician   Cortez Kenny    Discharge Orders      General info for SNF    Length of Stay Estimate: Short Term Care: Estimated # of Days <30  Condition at Discharge: Improving  Level of care:skilled   Rehabilitation Potential: Good  Admission H&P remains valid and up-to-date: Yes  Recent Chemotherapy: N/A  Use Nursing Home Standing Orders: Yes     Mantoux instructions    Give two-step Mantoux (PPD) Per Facility Policy Yes     Wound care (specify)    Site:   left hip  Instructions:  Do not submerge in water. Cover for showers. Change if >60% saturation.     Follow Up and recommended labs and tests    Please call as soon as possible to make an appointment to be seen in Dr. Saad Estrada's clinic at 2 weeks postop for a recheck of your surgical site, possible repeat x-rays, and wound care. If you are at a TCU at this time and they have x-ray capabilities, you may complete your wound care and x-rays at your TCU and have them send your images to Dr. Estrada's office. Please call [phone] to schedule.     Dr. Estrada sees patients at 2 clinic locations:  [facility] [place]  [address] [place] [address]    [facility] - [address] [address]      Please call the on-call phone number [phone] during evenings, nights and weekends for any urgent needs. Prescription refills must be done during business hours by calling Dr. Estrada's care team at [phone].     Activity - Up with assistive device     Weight bearing status    Weight bearing as tolerated with assistive device. No specific precautions     Reason  for your hospital stay    Left femoral neck fracture s/p left HELGA     Full Code     Physical Therapy Adult Consult    Evaluate and treat as clinically indicated.    Reason:  Left femoral neck fracture s/p left HELGA     Occupational Therapy Adult Consult    Evaluate and treat as clinically indicated.    Reason:  Left femoral neck fracture s/p left HELGA     Fall precautions     Crutches DME    DME Documentation: Describe the reason for need to support medical necessity: Impaired gait status post hip surgery. I, the undersigned, certify that the above prescribed supplies are medically necessary for this patient and is both reasonable and necessary in reference to accepted standards of medical practice in the treatment of this patient's condition and is not prescribed as a convenience.     Cane DME    DME Documentation: Describe the reason for need to support medical necessity: Impaired gait status post hip surgery. I, the undersigned, certify that the above prescribed supplies are medically necessary for this patient and is both reasonable and necessary in reference to accepted standards of medical practice in the treatment of this patient's condition and is not prescribed as a convenience.     Walker DME    : DME Documentation: Describe the reason for need to support medical necessity: Impaired gait status post hip surgery. I, the undersigned, certify that the above prescribed supplies are medically necessary for this patient and is both reasonable and necessary in reference to accepted standards of medical practice in the treatment of this patient's condition and is not prescribed as a convenience.     Diet    Follow this diet upon discharge: Orders Placed This Encounter      Advance Diet as Tolerated: Regular Diet Adult       Significant Results and Procedures   Most Recent 3 CBC's:  Recent Labs   Lab Test 11/19/23  0652 11/18/23  0738 11/17/23  0606 11/16/23  1659 04/19/22  0729   WBC  --   --  10.5 10.5 5.4   HGB  9.6* 10.5* 14.7 14.6 15.9   MCV  --   --  103* 103* 102*   PLT  --   --  196 195 221     Most Recent 3 BMP's:  Recent Labs   Lab Test 11/20/23  0704 11/19/23  0652 11/19/23  0607 11/18/23  0738 11/17/23  0606   NA  --  138  --  135 141   POTASSIUM 4.3 4.2  --  4.5 4.9   CHLORIDE  --  105  --  102 106   CO2  --  28  --  25 24   BUN  --  23.2*  --  27.6* 21.0   CR  --  1.06  --  1.17 1.03   ANIONGAP  --  5*  --  8 11   PARVIN  --  8.0*  --  8.0* 8.6*   GLC  --  102* 101* 104* 127*   ,   Results for orders placed or performed during the hospital encounter of 11/16/23   XR Shoulder Left G/E 3 Views    Narrative    EXAM: XR SHOULDER LEFT G/E 3 VIEWS  LOCATION: [facility]  DATE: 11/16/2023    INDICATION: Left shoulder pain after a fall.    COMPARISON: None.      Impression    IMPRESSION:   1.  No acute fracture or joint malalignment.  2.  Mild left glenohumeral and acromioclavicular degenerative arthrosis.  3.  Old healed left posterior 8th rib fracture.     XR Pelvis w Hip Left 1 View    Narrative    EXAM: XR PELVIS AND HIP LEFT 1 VIEW  LOCATION: [facility]  DATE: 11/16/2023    INDICATION: Left hip pain with fall  COMPARISON: None.      Impression    IMPRESSION: Acute left femoral neck fracture, a mid cervical fracture with mild impaction and angulation. The trochanters appear intact. The femoral head articular surface is intact. Left hip joint alignment is normal. Left hip joint spacing is   maintained with minimal degenerative arthritic spurring. Mild-moderate degenerative joint space narrowing and spurring in the right hip. Moderate-advanced degenerative disc and facet changes in the lower lumbar spine. Mild degenerative arthritic changes   in the SI joints.   XR Hip Port Left 1 View    Narrative    This exam was marked as non-reportable because it will not be read by a   radiologist or a [place] non-radiologist provider.         XR Pelvis w Hip Left 1 View    Narrative     XR PELVIS AND HIP LEFT 1 VIEW 11/17/2023 2:08 PM    HISTORY: Status post Hip surgery    COMPARISON: None.      Impression    IMPRESSION: Postop changes of a recent left total hip arthroplasty. No  fracture. Moderate degenerative changes right hip. Osteopenia.    CÉSAR HINTON MD         SYSTEM ID:  [id number]       Discharge Medications   Current Discharge Medication List        START taking these medications    Details   acetaminophen (TYLENOL) 325 MG tablet Take 3 tablets (975 mg) by mouth every 8 hours    Associated Diagnoses: Accident due to mechanical fall without injury, initial encounter      bisacodyl (DULCOLAX) 10 MG suppository Place 1 suppository (10 mg) rectally daily as needed for constipation (Use if Magnesium hydroxide (MILK of MAGNESIA) not effective after 24 hours. May discontinue if patient having bowel movement.)    Associated Diagnoses: Constipation, unspecified constipation type      calcium carbonate (TUMS) 500 MG chewable tablet Take 1 tablet (500 mg) by mouth 4 times daily as needed for heartburn    Associated Diagnoses: Gastroesophageal reflux disease without esophagitis      celecoxib (CELEBREX) 200 MG capsule Take 1 capsule (200 mg) by mouth daily    Associated Diagnoses: Accident due to mechanical fall without injury, initial encounter      famotidine (PEPCID) 20 MG tablet Take 1 tablet (20 mg) by mouth 2 times daily    Associated Diagnoses: Gastroesophageal reflux disease without esophagitis      magnesium hydroxide (MILK OF MAGNESIA) 400 MG/5ML suspension Take 30 mLs by mouth daily as needed for constipation (Use if preventive measures (senna-docusate, docusate, and polyethylene glycol) are not effective.)    Associated Diagnoses: Constipation, unspecified constipation type      melatonin 1 MG TABS tablet Take 1 tablet (1 mg) by mouth nightly as needed for sleep    Associated Diagnoses: Cerebrovascular accident (CVA), unspecified mechanism (H)      oxyCODONE (ROXICODONE) 5 MG tablet  Take 1 tablet (5 mg) by mouth every 6 hours as needed for pain  Qty: 12 tablet, Refills: 0    Associated Diagnoses: Accident due to mechanical fall without injury, initial encounter      polyethylene glycol (MIRALAX) 17 GM/Dose powder Take 17 g by mouth daily    Associated Diagnoses: Constipation, unspecified constipation type      QUEtiapine (SEROQUEL) 25 MG tablet Take 1 tablet (25 mg) by mouth 2 times daily    Associated Diagnoses: Cerebrovascular accident (CVA), unspecified mechanism (H)      senna-docusate (SENOKOT-S/PERICOLACE) 8.6-50 MG tablet Take 1 tablet by mouth 2 times daily as needed for constipation    Associated Diagnoses: Accident due to mechanical fall without injury, initial encounter           CONTINUE these medications which have NOT CHANGED    Details   Ascorbic Acid (VITAMIN C PO) Take 1,000 mg by mouth daily      atorvastatin (LIPITOR) 20 MG tablet Take 1 tablet (20 mg) by mouth daily  Qty: 90 tablet, Refills: 4    Associated Diagnoses: Cerebrovascular accident (CVA), unspecified mechanism (H)      clopidogrel (PLAVIX) 75 MG tablet Take 1 tablet (75 mg) by mouth daily  Qty: 90 tablet, Refills: 4    Associated Diagnoses: Cerebrovascular accident (CVA), unspecified mechanism (H)      Cyanocobalamin (VITAMIN B-12 PO) Take 1 tablet by mouth daily      lisinopril (ZESTRIL) 5 MG tablet Take 5 mg by mouth daily      multivitamin, therapeutic with minerals (THERA-VIT-M) TABS tablet Take 1 tablet by mouth daily      Vitamin D3 (CHOLECALCIFEROL) 25 mcg (1000 units) tablet Take 1,000 Units by mouth daily           Allergies   No Known Allergies

## 2023-11-20 NOTE — PLAN OF CARE
Goal Outcome Evaluation:       Patient vital signs are at baseline: Yes  Patient able to ambulate as they were prior to admission or with assist devices provided by therapies during their stay:  Yes  Patient MUST void prior to discharge:  Yes  Patient able to tolerate oral intake:  Yes  Pain has adequate pain control using Oral analgesics:  Yes  Does patient have an identified :  Yes  Has goal D/C date and time been discussed with patient:  Yes         Pt alert and oriented x 4 but forgetful., VSS in Ra. Pain given scheduled tylenol. Voiding via bathroom assist of 1, GB+walker. Dressing moderate brown/Serosanguinous drainage,ice applied.  CMS intact.        Blood pressure 139/72, pulse 85, temperature 97.7  F (36.5  C), temperature source Temporal, resp. rate 18, SpO2 100%.

## 2023-11-20 NOTE — PROVIDER NOTIFICATION
Page to Gina with ortho - significant new serosanguinous drainage from incision and bruising around L hip. Worsening L shoulder bruising, hospitalist would like ortho to assess.    Request that SW delay transport until this afternoon.

## 2023-11-20 NOTE — PROGRESS NOTES
Care Management Discharge Note    Discharge Date: 11/20/2023     Discharge Disposition: [facility]    Discharge DME: Walker, Shower Chair    Discharge Transportation: [facility] wheelchair transport    PAS Confirmation Code: [id number] (11/19/23)  Patient/family educated on Medicare website which has current facility and service quality ratings: no    Persons Notified of Discharge Plans: [facility] admissions, charge nurse, bedside nurse  Patient/Family in Agreement with the Plan:  Yes    Handoff Referral Completed: Yes    Additional Information:  Confirmed with [facility] Transportation that patient has a wheelchair ride set up for this morning between [id number]. Charge nurse and bedside RN notified of transport time. Josselyn in admission at [facility] notified and discharge orders have been signed and faxed to TCU. No further CM needs at this time.     ADDENDUM 0943: Update from bedside RN, Ortho will need to come assess patient due to new drainage. Morning transport time will no longer work. [facility] [employer] w/c ride set up for today between [id number]. [facility], charge, and bedside RN updated.    ADDENDUM 1136: Update from care team, patient will no longer be discharging today.  [facility] w/c transport has been canceled. Josselyn @ [facility] notified discharge is currently on home pending shoulder MRI.         Dianne Lund, RN  Care Coordinator  [facility]  [phone]

## 2023-11-20 NOTE — PLAN OF CARE
Goal Outcome Evaluation:      Plan of Care Reviewed With: patient    Overall Patient Progress: improvingOverall Patient Progress: improving    3pm-11pm RN    Patient VS are at baseline: Yes  Patient able to ambulate as they were prior to admission or with assist devices provided by therapy during their stay: Yes  Patient must void prior to discharge: Yes   Patient able to tolerate oral intake: Yes  Patient has adequate pain control using oral analgesics: Yes    Patient up in chair this afternoon/evening. Tylenol, atarax and robaxin used for pain management patient complains of spasms on left shoulder. Dressing on hip has a scant amount of drainage. CMS intact, now on seroquel. Plan is to discharge to TCU.

## 2023-11-21 ENCOUNTER — APPOINTMENT (OUTPATIENT)
Dept: PHYSICAL THERAPY | Facility: CLINIC | Age: 78
DRG: 522 | End: 2023-11-21
Payer: COMMERCIAL

## 2023-11-21 ENCOUNTER — LAB REQUISITION (OUTPATIENT)
Dept: LAB | Facility: CLINIC | Age: 78
End: 2023-11-21
Payer: COMMERCIAL

## 2023-11-21 ENCOUNTER — APPOINTMENT (OUTPATIENT)
Dept: OCCUPATIONAL THERAPY | Facility: CLINIC | Age: 78
DRG: 522 | End: 2023-11-21
Payer: COMMERCIAL

## 2023-11-21 VITALS
OXYGEN SATURATION: 94 % | DIASTOLIC BLOOD PRESSURE: 71 MMHG | SYSTOLIC BLOOD PRESSURE: 115 MMHG | BODY MASS INDEX: 28.78 KG/M2 | WEIGHT: 205.6 LBS | TEMPERATURE: 97.4 F | HEIGHT: 71 IN | RESPIRATION RATE: 16 BRPM | HEART RATE: 89 BPM

## 2023-11-21 VITALS
WEIGHT: 205 LBS | HEIGHT: 71 IN | SYSTOLIC BLOOD PRESSURE: 116 MMHG | OXYGEN SATURATION: 95 % | RESPIRATION RATE: 18 BRPM | HEART RATE: 78 BPM | DIASTOLIC BLOOD PRESSURE: 76 MMHG | TEMPERATURE: 98 F | BODY MASS INDEX: 28.7 KG/M2

## 2023-11-21 DIAGNOSIS — U07.1 COVID-19: ICD-10-CM

## 2023-11-21 LAB
ATRIAL RATE - MUSE: 83 BPM
DIASTOLIC BLOOD PRESSURE - MUSE: NORMAL MMHG
HGB BLD-MCNC: 9.3 G/DL (ref 13.3–17.7)
INTERPRETATION ECG - MUSE: NORMAL
MAGNESIUM SERPL-MCNC: 2.2 MG/DL (ref 1.7–2.3)
P AXIS - MUSE: 56 DEGREES
POTASSIUM SERPL-SCNC: 4.4 MMOL/L (ref 3.4–5.3)
PR INTERVAL - MUSE: 198 MS
QRS DURATION - MUSE: 134 MS
QT - MUSE: 428 MS
QTC - MUSE: 502 MS
R AXIS - MUSE: -63 DEGREES
SYSTOLIC BLOOD PRESSURE - MUSE: NORMAL MMHG
T AXIS - MUSE: 28 DEGREES
VENTRICULAR RATE- MUSE: 83 BPM

## 2023-11-21 PROCEDURE — 36415 COLL VENOUS BLD VENIPUNCTURE: CPT | Performed by: STUDENT IN AN ORGANIZED HEALTH CARE EDUCATION/TRAINING PROGRAM

## 2023-11-21 PROCEDURE — 97535 SELF CARE MNGMENT TRAINING: CPT | Mod: GO

## 2023-11-21 PROCEDURE — 85018 HEMOGLOBIN: CPT | Performed by: STUDENT IN AN ORGANIZED HEALTH CARE EDUCATION/TRAINING PROGRAM

## 2023-11-21 PROCEDURE — 87635 SARS-COV-2 COVID-19 AMP PRB: CPT | Performed by: NURSE PRACTITIONER

## 2023-11-21 PROCEDURE — 250N000013 HC RX MED GY IP 250 OP 250 PS 637: Performed by: PHYSICIAN ASSISTANT

## 2023-11-21 PROCEDURE — 250N000011 HC RX IP 250 OP 636: Mod: JZ | Performed by: INTERNAL MEDICINE

## 2023-11-21 PROCEDURE — 250N000013 HC RX MED GY IP 250 OP 250 PS 637: Performed by: STUDENT IN AN ORGANIZED HEALTH CARE EDUCATION/TRAINING PROGRAM

## 2023-11-21 PROCEDURE — 83735 ASSAY OF MAGNESIUM: CPT | Performed by: STUDENT IN AN ORGANIZED HEALTH CARE EDUCATION/TRAINING PROGRAM

## 2023-11-21 PROCEDURE — 97116 GAIT TRAINING THERAPY: CPT | Mod: GP | Performed by: PHYSICAL THERAPIST

## 2023-11-21 PROCEDURE — 84132 ASSAY OF SERUM POTASSIUM: CPT | Performed by: STUDENT IN AN ORGANIZED HEALTH CARE EDUCATION/TRAINING PROGRAM

## 2023-11-21 PROCEDURE — 97530 THERAPEUTIC ACTIVITIES: CPT | Mod: GP | Performed by: PHYSICAL THERAPIST

## 2023-11-21 PROCEDURE — 99239 HOSP IP/OBS DSCHRG MGMT >30: CPT | Performed by: STUDENT IN AN ORGANIZED HEALTH CARE EDUCATION/TRAINING PROGRAM

## 2023-11-21 PROCEDURE — 250N000013 HC RX MED GY IP 250 OP 250 PS 637: Performed by: NURSE PRACTITIONER

## 2023-11-21 RX ADMIN — QUETIAPINE FUMARATE 25 MG: 25 TABLET ORAL at 09:10

## 2023-11-21 RX ADMIN — SENNOSIDES AND DOCUSATE SODIUM 1 TABLET: 8.6; 5 TABLET ORAL at 09:10

## 2023-11-21 RX ADMIN — KETOROLAC TROMETHAMINE 15 MG: 15 INJECTION, SOLUTION INTRAMUSCULAR; INTRAVENOUS at 02:22

## 2023-11-21 RX ADMIN — CLOPIDOGREL BISULFATE 75 MG: 75 TABLET ORAL at 09:11

## 2023-11-21 RX ADMIN — MULTIPLE VITAMINS W/ MINERALS TAB 1 TABLET: TAB at 09:11

## 2023-11-21 RX ADMIN — FAMOTIDINE 20 MG: 20 TABLET ORAL at 09:10

## 2023-11-21 RX ADMIN — ATORVASTATIN CALCIUM 20 MG: 20 TABLET, FILM COATED ORAL at 09:10

## 2023-11-21 RX ADMIN — POLYETHYLENE GLYCOL 3350 17 G: 17 POWDER, FOR SOLUTION ORAL at 09:11

## 2023-11-21 RX ADMIN — LISINOPRIL 5 MG: 5 TABLET ORAL at 09:10

## 2023-11-21 ASSESSMENT — ACTIVITIES OF DAILY LIVING (ADL)
ADLS_ACUITY_SCORE: 31
ADLS_ACUITY_SCORE: 24
ADLS_ACUITY_SCORE: 31
ADLS_ACUITY_SCORE: 31

## 2023-11-21 NOTE — DISCHARGE INSTRUCTIONS
Please follow up with TCU provider after discharge and outpatient orthopedics for shoulder/rotator cuff.

## 2023-11-21 NOTE — PLAN OF CARE
Goal Outcome Evaluation:           Overall Patient Progress: improvingOverall Patient Progress: improving     Patient vital signs are at baseline: Yes, with soft BP  Patient able to ambulate as they were prior to admission or with assist devices provided by therapies during their stay:  Yes, up with assist of one and walker  Patient MUST void prior to discharge:  Yes  Patient able to tolerate oral intake:  Yes  Pain has adequate pain control using Oral analgesics:  Yes  Does patient have an identified :  Yes  Has goal D/C date and time been discussed with patient:  Yes     Rated pain 9/10, PRN Toradol given for pain. Arm sling in place. Shoulder tender. Left hip dressing with moderate drainage but not saturated yet. Will continue to monitor.

## 2023-11-21 NOTE — PLAN OF CARE
Goal Outcome Evaluation:      Plan of Care Reviewed With: patient    Overall Patient Progress: improvingOverall Patient Progress: improving     Pain in left shoulder and hip controlled with po meds. Unable to lift left arm from shoulder joint, bends ok at elbow- arm supported with sling and pillows for comfort. Cms intact. Mod amt drainage on drsg. Moves with 1 assist & walker to chair & short walks in room. Poor appetite- declined dinner, taking some fluids. Voiding without difficulty. Last BM 16th- mom given at . MRI completed this evening of left shoulder.

## 2023-11-21 NOTE — DISCHARGE SUMMARY
"[facility]  Hospitalist Discharge Summary      Date of Admission:  11/16/2023  Date of Discharge:  No discharge date for patient encounter.  Discharging Provider: Pilar Mancilla MD  Discharge Service: Hospitalist Service    Discharge Diagnoses   Left displaced femoral neck fracture status post left HELGA 11/17  Mechanical fall  Full-thickness rotator cuff tear  History of CVA  Hypertension.     Clinically Significant Risk Factors     # Overweight: Estimated body mass index is 28.68 kg/m  as calculated from the following:    Height as of this encounter: 1.803 m (5' 11\").    Weight as of this encounter: 93.3 kg (205 lb 9.6 oz).       Follow-ups Needed After Discharge   Follow-up Appointments     Follow Up and recommended labs and tests      Please call as soon as possible to make an appointment to be seen in Dr. Saad Estrada's clinic at 2 weeks postop for a recheck of your surgical   site, possible repeat x-rays, and wound care. If you are at a TCU at this   time and they have x-ray capabilities, you may complete your wound care   and x-rays at your TCU and have them send your images to Dr. Estrada's   office. Please call [phone] to schedule.    Left shoulder: follow-up with a shoulder specialist (Dr Fuentes or Dr Mckeon)   in 2-3 weeks for recheck and treatment plan.  [place] number:   [phone]     Dr. Estrada sees patients at 2 clinic locations:  [facility]  [address] [place] [address]    [facility] [place] [id number]      Please call the on-call phone number [phone] during evenings, nights   and weekends for any urgent needs. Prescription refills must be done   during business hours by calling Dr. Estrada's care team at [phone].              Unresulted Labs Ordered in the Past 30 Days of this Admission       No orders found from 10/17/2023 to 11/17/2023.            Discharge Disposition   Discharged to " TCU  Condition at discharge: Stable    Hospital Course   John is an 78-year-old who presented on 11/16 after ground-level fall while playing ping-pong.  He had immediate left hip pain and was unable to bear weight, imaging demonstrated a left displaced femoral neck fracture.  Status post HELGA 11/17.  Also noted to have full-thickness left rotator cuff tear.  Evaluated by orthopedics and recommended outpatient management.       Consultations This Hospital Stay   ORTHOPEDIC SURGERY IP CONSULT  CARE MANAGEMENT / SOCIAL WORK IP CONSULT  PHYSICAL THERAPY ADULT IP CONSULT  OCCUPATIONAL THERAPY ADULT IP CONSULT  PHYSICAL THERAPY ADULT IP CONSULT  OCCUPATIONAL THERAPY ADULT IP CONSULT    Code Status   Full Code    Time Spent on this Encounter   I, Pilar Mancilla MD, personally saw the patient today and spent greater than 30 minutes discharging this patient.       Pilar Mancilla MD  [facility]  [address] [place] [address]  Phone: [phone]  Fax: [phone]  ______________________________________________________________________    Physical Exam   Vital Signs: Temp: 97.4  F (36.3  C) Temp src: Temporal BP: 115/71 Pulse: 89   Resp: 16 SpO2: 94 % O2 Device: None (Room air)    Weight: 205 lbs 9.6 oz  Constitutional: awake, alert, cooperative, no apparent distress, and appears stated age, left arm in sling  Eyes: Anicteric sclera  ENT: normocepalic, without obvious abnormality  Respiratory: No increased work of breathing, good air exchange, clear to auscultation bilaterally, no crackles or wheezing  Cardiovascular: Normal rate, regular rhythm, no murmur  GI: Soft, nontender, nondistended  Skin: no bruising or bleeding  Musculoskeletal: no lower extremity pitting edema present  Neurologic: Awake, alert, oriented to name, place and time.  Neuropsychiatric: Pleasant       Primary Care Physician   Cortez Kenny    Discharge Orders      Primary Care - Care Coordination Referral      General  info for SNF    Length of Stay Estimate: Short Term Care: Estimated # of Days <30  Condition at Discharge: Improving  Level of care:skilled   Rehabilitation Potential: Good  Admission H&P remains valid and up-to-date: Yes  Recent Chemotherapy: N/A  Use Nursing Home Standing Orders: Yes     Mantoux instructions    Give two-step Mantoux (PPD) Per Facility Policy Yes     Activity - Up with assistive device     Reason for your hospital stay    Left femoral neck fracture s/p left HELGA     Weight bearing status    Weight bearing as tolerated left LE with assistive device. No specific precautions.   Can use regular walker or chapo walker given left shoulder injury     Follow Up and recommended labs and tests    Please call as soon as possible to make an appointment to be seen in Dr. Saad Estrada's clinic at 2 weeks postop for a recheck of your surgical site, possible repeat x-rays, and wound care. If you are at a TCU at this time and they have x-ray capabilities, you may complete your wound care and x-rays at your TCU and have them send your images to Dr. Estrada's office. Please call [phone] to schedule.    Left shoulder: follow-up with a shoulder specialist (Dr Fuentes or Dr Mckeon) in 2-3 weeks for recheck and treatment plan.  [place] number: [phone]     Dr. Estrada sees patients at 2 clinic locations:  [facility]  [address] [place], [place] [id number]    [facility]  [address] [place], [place] [id number]      Please call the on-call phone number [phone] during evenings, nights and weekends for any urgent needs. Prescription refills must be done during business hours by calling Dr. Estrada's care team at [phone].     Wound care (specify)    Site:   left hip  Instructions:  Daily dressing changes until saturation slows: adaptic/gauze/ABD/tape.  Can apply an aquacel or tegaderm dressing once saturation ceases.  Left shoulder: sling for comfort.  Remove 2-3 times a  day for codman exercises and for hygiene.     Full Code     Physical Therapy Adult Consult    Evaluate and treat as clinically indicated.    Reason:  Left femoral neck fracture s/p left HELGA     Occupational Therapy Adult Consult    Evaluate and treat as clinically indicated.    Reason:  Left femoral neck fracture s/p left HELGA     Fall precautions     Crutches DME    DME Documentation: Describe the reason for need to support medical necessity: Impaired gait status post hip surgery. I, the undersigned, certify that the above prescribed supplies are medically necessary for this patient and is both reasonable and necessary in reference to accepted standards of medical practice in the treatment of this patient's condition and is not prescribed as a convenience.     Cane DME    DME Documentation: Describe the reason for need to support medical necessity: Impaired gait status post hip surgery. I, the undersigned, certify that the above prescribed supplies are medically necessary for this patient and is both reasonable and necessary in reference to accepted standards of medical practice in the treatment of this patient's condition and is not prescribed as a convenience.     Walker DME    : DME Documentation: Describe the reason for need to support medical necessity: Impaired gait status post hip surgery. I, the undersigned, certify that the above prescribed supplies are medically necessary for this patient and is both reasonable and necessary in reference to accepted standards of medical practice in the treatment of this patient's condition and is not prescribed as a convenience.     Diet    Follow this diet upon discharge: Orders Placed This Encounter      Advance Diet as Tolerated: Regular Diet Adult       Significant Results and Procedures   Most Recent 3 CBC's:  Recent Labs   Lab Test 11/21/23  0657 11/19/23  0652 11/18/23  0738 11/17/23  0606 11/16/23  1659 04/19/22  0729   WBC  --   --   --  10.5 10.5 5.4   HGB 9.3*  9.6* 10.5* 14.7 14.6 15.9   MCV  --   --   --  103* 103* 102*   PLT  --   --   --  196 195 221     Most Recent 3 BMP's:  Recent Labs   Lab Test 11/21/23  0657 11/20/23  0704 11/19/23  0652 11/19/23  0607 11/18/23  0738 11/17/23  0606   NA  --   --  138  --  135 141   POTASSIUM 4.4 4.3 4.2  --  4.5 4.9   CHLORIDE  --   --  105  --  102 106   CO2  --   --  28  --  25 24   BUN  --   --  23.2*  --  27.6* 21.0   CR  --   --  1.06  --  1.17 1.03   ANIONGAP  --   --  5*  --  8 11   PARVIN  --   --  8.0*  --  8.0* 8.6*   GLC  --   --  102* 101* 104* 127*       Discharge Medications   Current Discharge Medication List        START taking these medications    Details   acetaminophen (TYLENOL) 325 MG tablet Take 3 tablets (975 mg) by mouth every 8 hours    Associated Diagnoses: Accident due to mechanical fall without injury, initial encounter      bisacodyl (DULCOLAX) 10 MG suppository Place 1 suppository (10 mg) rectally daily as needed for constipation (Use if Magnesium hydroxide (MILK of MAGNESIA) not effective after 24 hours. May discontinue if patient having bowel movement.)    Associated Diagnoses: Constipation, unspecified constipation type      calcium carbonate (TUMS) 500 MG chewable tablet Take 1 tablet (500 mg) by mouth 4 times daily as needed for heartburn    Associated Diagnoses: Gastroesophageal reflux disease without esophagitis      celecoxib (CELEBREX) 200 MG capsule Take 1 capsule (200 mg) by mouth daily    Associated Diagnoses: Accident due to mechanical fall without injury, initial encounter      famotidine (PEPCID) 20 MG tablet Take 1 tablet (20 mg) by mouth 2 times daily    Associated Diagnoses: Gastroesophageal reflux disease without esophagitis      magnesium hydroxide (MILK OF MAGNESIA) 400 MG/5ML suspension Take 30 mLs by mouth daily as needed for constipation (Use if preventive measures (senna-docusate, docusate, and polyethylene glycol) are not effective.)    Associated Diagnoses: Constipation,  unspecified constipation type      melatonin 1 MG TABS tablet Take 1 tablet (1 mg) by mouth nightly as needed for sleep    Associated Diagnoses: Cerebrovascular accident (CVA), unspecified mechanism (H)      oxyCODONE (ROXICODONE) 5 MG tablet Take 1 tablet (5 mg) by mouth every 6 hours as needed for pain  Qty: 12 tablet, Refills: 0    Associated Diagnoses: Accident due to mechanical fall without injury, initial encounter      polyethylene glycol (MIRALAX) 17 GM/Dose powder Take 17 g by mouth daily    Associated Diagnoses: Constipation, unspecified constipation type      QUEtiapine (SEROQUEL) 25 MG tablet Take 1 tablet (25 mg) by mouth 2 times daily    Associated Diagnoses: Cerebrovascular accident (CVA), unspecified mechanism (H)      senna-docusate (SENOKOT-S/PERICOLACE) 8.6-50 MG tablet Take 1 tablet by mouth 2 times daily as needed for constipation    Associated Diagnoses: Accident due to mechanical fall without injury, initial encounter           CONTINUE these medications which have NOT CHANGED    Details   Ascorbic Acid (VITAMIN C PO) Take 1,000 mg by mouth daily      atorvastatin (LIPITOR) 20 MG tablet Take 1 tablet (20 mg) by mouth daily  Qty: 90 tablet, Refills: 4    Associated Diagnoses: Cerebrovascular accident (CVA), unspecified mechanism (H)      clopidogrel (PLAVIX) 75 MG tablet Take 1 tablet (75 mg) by mouth daily  Qty: 90 tablet, Refills: 4    Associated Diagnoses: Cerebrovascular accident (CVA), unspecified mechanism (H)      Cyanocobalamin (VITAMIN B-12 PO) Take 1 tablet by mouth daily      lisinopril (ZESTRIL) 5 MG tablet Take 5 mg by mouth daily      multivitamin, therapeutic with minerals (THERA-VIT-M) TABS tablet Take 1 tablet by mouth daily      Vitamin D3 (CHOLECALCIFEROL) 25 mcg (1000 units) tablet Take 1,000 Units by mouth daily           Allergies   No Known Allergies

## 2023-11-21 NOTE — PROGRESS NOTES
Care Management Follow Up    Length of Stay (days): 5    Expected Discharge Date: 11/21/2023     Concerns to be Addressed: discharge planning     Patient plan of care discussed at interdisciplinary rounds: Yes    Anticipated Discharge Disposition: Transitional Care     Anticipated Discharge Services: None  Anticipated Discharge DME: Estiven Warder Chair    Patient/family educated on Medicare website which has current facility and service quality ratings: no  Education Provided on the Discharge Plan:    Patient/Family in Agreement with the Plan:      Referrals Placed by CM/SW:    Private pay costs discussed: Not applicable    Additional Information:  CM contacted Hospitalist and patient is medically ready.CM left message for [facility] TCU to verify when they can admit him.     Mayra Acosta, RN, BSN, CM  Inpatient Care Coordination  [facility]  [phone]

## 2023-11-21 NOTE — PROGRESS NOTES
Care Management Discharge Note    Discharge Date: 11/21/2023       Discharge Disposition: Transitional Care    Discharge Services: None    Discharge DME: Walker, Shower Chair    Discharge Transportation:  [email or web address] w/c    Private pay costs discussed: transportation costs    Does the patient's insurance plan have a 3 day qualifying hospital stay waiver?  Yes     Which insurance plan 3 day waiver is available? Alternative insurance waiver    Will the waiver be used for post-acute placement? Yes    PAS Confirmation Code: [id number] (11/19/23)  Patient/family educated on Medicare website which has current facility and service quality ratings: yes    Education Provided on the Discharge Plan: no   Persons Notified of Discharge Plans: Patient, Charge and Staff RN, NST, TCU  Patient/Family in Agreement with the Plan:  yes    Handoff Referral Completed: No    Additional Information:  Patient to discharge today to [facility] TCU via [facility] w/ with transport window of [phone]. Orders were faxed. Patient, TCU,NST, Charge and Staff RN's all notified.    Mayra Acosta, RN, BSN, CM  Inpatient Care Coordination  [facility]  [phone]

## 2023-11-21 NOTE — PLAN OF CARE
Patient vital signs are at baseline: Yes, on 1L NC  Patient able to ambulate as they were prior to admission or with assist devices provided by therapies during their stay:  Yes, A1 gait belt and walker   Patient MUST void prior to discharge:  Yes  Patient able to tolerate oral intake:  Yes  Pain has adequate pain control using Oral analgesics:  Yes  Does patient have an identified :  Yes  Has goal D/C date and time been discussed with patient:  Yes     Dressing changed per COSTA Fuentes. CMS intact. Will discharge to TCU today.

## 2023-11-21 NOTE — PLAN OF CARE
Physical Therapy Discharge Summary    Reason for therapy discharge:    Discharged to transitional care facility.    Progress towards therapy goal(s). See goals on Care Plan in [facility] electronic health record for goal details.  Goals not met.  Barriers to achieving goals:   discharge from facility.    Therapy recommendation(s):    Continued therapy is recommended.  Rationale/Recommendations:  Pt progressing slower than anticipated to meet PT goals. Currently pt is needing ModA to stand from chair with walker, poor safety awareness. Wife is unable to provide pt's needed support at home. Recommending TCU to progress independence with mobility and strength.

## 2023-11-21 NOTE — PROGRESS NOTES
"[facility]    Medicine Progress Note - Hospitalist Service    Date of Admission:  11/16/2023    Assessment & Plan     John is an 78-year-old who presented on 11/16 after ground-level fall while playing ping-pong.  He had immediate left hip pain and was unable to bear weight, imaging demonstrated a left displaced femoral neck fracture.  Status post HELGA 11/17.    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  He is not really needing much pain management other than Tylenol but will have as needed Toradol available.  Started Seroquel 25 mg twice daily for agitation.  - PT evaluated, currently able to ambulate with recommendations for TCU    L shoulder sprain, concern for rotator cuff injury.    No e/o acute fracture, mild L glenohumeral and AC degenerative arthrosis.  unable to raise his left arm, is able to move passively without significant pain, requested ortho evaluate prior to discharge and appreciate recommendations    ABLA  Secondary to surgery, stable    History cryptogenic CVA  Plavix    Hypertension  Continue lisinopril       Diet: Advance Diet as Tolerated: Regular Diet Adult  Diet    DVT Prophylaxis: Pneumatic Compression Devices  Sarah Catheter: Not present  Lines: None     Cardiac Monitoring: None  Code Status: Full Code      Clinically Significant Risk Factors                  # Hypertension: Noted on problem list        # Overweight: Estimated body mass index is 28.68 kg/m  as calculated from the following:    Height as of this encounter: 1.803 m (5' 11\").    Weight as of this encounter: 93.3 kg (205 lb 9.6 oz).      # Financial/Environmental Concerns: none         Disposition Plan      Expected Discharge Date: 11/21/2023,  3:00 PM    Destination: home with family;home " with help/services  Discharge Comments: Had PSC 11/18 from [id number]  Now needing TCU          Ashwini Comer MD  Hospitalist Service  [facility]  Securely message with [employer] (more info)  Text page via [employer] Paging/Directory   ______________________________________________________________________    Interval History   Doing well, continued decreased aROM with shoulder flexion on left    Physical Exam   Vital Signs: Temp: 97.7  F (36.5  C) Temp src: Temporal BP: 121/62 Pulse: 85   Resp: 18 SpO2: 95 % O2 Device: None (Room air)    Weight: 205 lbs 9.6 oz    General: Very pleasant male, Awake, alert, interactive.  HEENT: Sclerae anicteric.  Mucous membranes moist.  Cardiac: Regular rate and rhythm without murmur, gallop, or rub.   Respiratory: Normal work of breathing.  Musculoskeletal: Unable to actively extend left arm, range of motion with passive extension and flexion intact.  Pain with palpation of lateral aspect of deltoid.  Psychologic: Appropriate mood and affect.      Medical Decision Making       45 MINUTES SPENT BY ME on the date of service doing chart review, history, exam, documentation & further activities per the note.        Data     I have personally reviewed the following data over the past 24 hrs:    N/A  \   N/A   / N/A     N/A N/A N/A /  N/A   4.3 N/A N/A \       Imaging results reviewed over the past 24 hrs:   No results found for this or any previous visit (from the past 24 hour(s)).

## 2023-11-21 NOTE — PROGRESS NOTES
"Patient's After Visit Summary was reviewed with patient and/or significant other.   Patient verbalized understanding of After Visit Summary, recommended follow up and was given an opportunity to ask questions.   Discharge medications sent home with patient/family: YES, No   Discharged with transport tech.,    /71 (BP Location: Right arm)   Pulse 89   Temp 97.4  F (36.3  C) (Temporal)   Resp 16   Ht 1.803 m (5' 11\")   Wt 93.3 kg (205 lb 9.6 oz)   SpO2 94%   BMI 28.68 kg/m         "

## 2023-11-21 NOTE — PROGRESS NOTES
Orthopedic Surgery  John Riley  11/21/2023     Admit Date:  11/16/2023    POD: 4 Days Post-Op   Procedure(s):  Left total hip arthroplasty  Large left rotator cuff tear     Patient resting comfortably in bed.    Pain controlled in hip.  Reports use of the chapo walker went well today. Sling improving left shoulder pain.   Tolerating oral intake.    Denies nausea or vomiting  Denies chest pain or shortness of breath    Temp:  [96.8  F (36  C)-97.7  F (36.5  C)] 97.4  F (36.3  C)  Pulse:  [71-89] 89  Resp:  [16-18] 16  BP: ()/(56-71) 115/71  SpO2:  [94 %-95 %] 94 %    Alert and oriented  Left HIP:   Dressing is saturated again today.   Moderate left thigh swelling present.   Bilateral calves are soft, non-tender.  Left lower extremity is NVI.  Sensation intact bilateral lower extremities  Patient able to resist dorsi and plantar flexion bilaterally  +Dp pulse    Left SHOULDER:  Ecchymosis present along the left shoulder and biceps.   Tender to palpation at the anterior shoulder/proximal biceps tendon region  Patient is not able to actively flex the left shoulder  Denies pain with passive ROM: flexion and rotation  Sensation and pulses intact.   Able to range elbow, wrist and fingers without pain.     Labs:  Recent Labs   Lab Test 11/21/23  0657 11/19/23  0652 11/18/23  0738 11/17/23  0606 11/16/23  1659 04/19/22  0729   WBC  --   --   --  10.5 10.5 5.4   HGB 9.3* 9.6* 10.5* 14.7 14.6 15.9   PLT  --   --   --  196 195 221     Recent Labs   Lab Test 11/17/23  0606 12/10/16  1905   INR 0.91 0.95     Shoulder Xray:   1.  No acute fracture or joint malalignment.  2.  Mild left glenohumeral and acromioclavicular degenerative arthrosis.  3.  Old healed left posterior 8th rib fracture.    Left shoulder MRI:   1.  Full-thickness tears of the supraspinatus, infraspinatus, and subscapularis tendons with retraction to the glenoid.  2.  Nonvisualized intracapsular biceps tendon is compatible with full-thickness tear  and retraction.  3.  There is labral tearing posteriorly, superiorly, and inferiorly.   4.  Small glenohumeral joint effusion which communicates with the subacromial bursa.  5.  Moderate to severe acromioclavicular joint degenerative changes.  6.  High riding humeral head.  7.  Partial tear of the anterior deltoid muscle.     1. PLAN:  HIP:  Activity: Weightbearing as tolerated. Activity as tolerated. No precautions. Continue to work with PT while in hospital.   Cultures: None  Diet: General.   Dressing: Patient is able to shower. Patient will keep dressing in place until post-op day 7.  DVT Prophylaxis: Plavix  Pain medication: Multimodal pain regimen     SHOULDER:  Large cuff tear.  Reviewed MR with Dr Estrada, recommends outpatient follow-up with shoulder specialist.   Sling: wear prn pain.   Okay to WBAT through left shoulder.  Encouraged daily codman exercises.    Ice prn      2. Disposition   Anticipate d/c to TCU.  Ortho stable.      Gina Hoang PA-C

## 2023-11-22 ENCOUNTER — TRANSITIONAL CARE UNIT VISIT (OUTPATIENT)
Dept: GERIATRICS | Facility: CLINIC | Age: 78
End: 2023-11-22
Payer: COMMERCIAL

## 2023-11-22 DIAGNOSIS — Z96.642 STATUS POST TOTAL REPLACEMENT OF LEFT HIP: ICD-10-CM

## 2023-11-22 DIAGNOSIS — D62 ANEMIA DUE TO BLOOD LOSS, ACUTE: ICD-10-CM

## 2023-11-22 DIAGNOSIS — S46.012D TRAUMATIC TEAR OF LEFT ROTATOR CUFF, UNSPECIFIED TEAR EXTENT, SUBSEQUENT ENCOUNTER: ICD-10-CM

## 2023-11-22 DIAGNOSIS — S72.002D CLOSED FRACTURE OF NECK OF LEFT FEMUR WITH ROUTINE HEALING, SUBSEQUENT ENCOUNTER: Primary | ICD-10-CM
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
## 2023-11-22 DIAGNOSIS — K59.01 SLOW TRANSIT CONSTIPATION: ICD-10-CM

## 2023-11-22 DIAGNOSIS — I63.9 CRYPTOGENIC STROKE (H): ICD-10-CM

## 2023-11-22 DIAGNOSIS — R53.81 PHYSICAL DECONDITIONING: ICD-10-CM

## 2023-11-22 DIAGNOSIS — I10 ESSENTIAL HYPERTENSION: ICD-10-CM

## 2023-11-22 DIAGNOSIS — R45.1 AGITATION: ICD-10-CM

## 2023-11-22 DIAGNOSIS — K21.00 GASTROESOPHAGEAL REFLUX DISEASE WITH ESOPHAGITIS, UNSPECIFIED WHETHER HEMORRHAGE: ICD-10-CM

## 2023-11-22 DIAGNOSIS — E53.8 VITAMIN B12 DEFICIENCY (NON ANEMIC): ICD-10-CM

## 2023-11-22 LAB — SARS-COV-2 RNA RESP QL NAA+PROBE: NEGATIVE

## 2023-11-22 PROCEDURE — 99310 SBSQ NF CARE HIGH MDM 45: CPT | Performed by: NURSE PRACTITIONER

## 2023-11-22 RX ORDER — QUETIAPINE FUMARATE 25 MG/1
25 TABLET, FILM COATED ORAL 2 TIMES DAILY PRN
Qty: 3 TABLET | Refills: 0 | Status: SHIPPED | OUTPATIENT
Start: 2023-11-22

## 2023-11-22 RX ORDER — METHOCARBAMOL 500 MG/1
500 TABLET, FILM COATED ORAL 4 TIMES DAILY PRN
Start: 2023-11-22

## 2023-11-22 NOTE — PATIENT INSTRUCTIONS
Orders  John Riley  1945  1) Please contact ortho to let them know that dressing was saturated and removed last night. ABD is in place with small amount of drainage today  2) Start dressing change orders for now 1) cleanse with wound cleanser and pat dry. 2) Apply ABD BID and PRN if drainage. Please update orders if ortho would like them adjusted.   2) Start methocarbamol 500 mg QID PRN for muscle spasms  3) Clarify that vitamin B12 supplement is 1000 mcg daily. For B12 deficiency  4) add to famotidine order to continue while taking celebrex for GI protection  5) CBC 11/28. Diagnosis: HTN  6) Discontinue seroquel  7) Start seroquel 25 mg BID PRN. For agitation. Add 14 day end date. Nursing to update provider 5 days prior to order renewal if using  JORGE Peralta CNP on 11/22/2023 at 9:39 AM

## 2023-11-22 NOTE — PROGRESS NOTES
[facility] GERIATRICS    PRIMARY CARE PROVIDER AND CLINIC:  Cortez Kenny MD, [id number] [facility] [place]  / [facility] [id number]  Chief Complaint   Patient presents with    Hospital F/U      [facility] Record Number:  [id number]  Place of Service where encounter took place:  [facility]  ([facility]) [[id number]]    John Riley  is a 78 year old  (1945), admitted to the above facility from  [facility]. Hospital stay 11/16/23 through 11/21/23..   HPI:    PMH significant for HTN, history of CVA 12/10/16, GERD, Basal cell carcinoma    Summary of recent hospitalization:  Patient was hospitalized at [facility] from 11/16/2023-11/21/2023 for left femoral neck fracture s/p left total hip arthroplasty and full thickness left rotator cuff tear. Patient presented to the ED for evaluation after he fell onto his left side while playing table tennis after his partner stepped on his foot.  Laboratory evaluation revealed essentially normal BMP and CBC.  X-ray to pelvis and left hip significant for acute left femoral neck fracture, a mid cervical fracture with mild impaction and angulation.  X-ray left shoulder shows no acute fracture or joint malalignment, mild left glenohumeral and AC degenerative arthrosis, old healed left posterior eighth rib.  Left shoulder MRI revealed full-thickness tears of supraspinatus, infraspinatus and subscapularis tendons with retraction to the glenoid, nonvisualized intracapsular biceps tendon is compatible with full-thickness tear and retraction, labral tearing posteriorly, superiorly and inferiorly, small glenohumeral joint effusion which communicates with subacromial bursa, moderate to severe acromioclavicular joint degenerative changes, high riding humeral head, partial tear of the anterior deltoid muscle.  Ortho was consulted and patient is s/p left total hip arthroplasty on 11/17/2023.  Patient will continue Plavix for DVT prophylaxis.   Okay for weightbearing as tolerated to legs.  Okay for weightbearing as tolerated through left shoulder with daily Codman exercises.  Follow-up outpatient with shoulder specialist.  Patient with acute blood loss anemia secondary to surgery, baseline hemoglobin around 14, hemoglobin at discharge 9.3 on 11/21/2023.  Patient was started on Seroquel at discharge. Discharged to TCU for physical rehabilitation and medical management.     Reviewed notes, imaging, labs from recent hospitalization.    Today patient was seen for admission visit in the TCU.  He is oriented x 3.  He rates his pain, which is mostly to his hip as a 1/10.  He denies any pain to his left shoulder.  He does also report that he is experiencing some muscle spasms.  Nursing staff removed Aquacel dressing due to drainage and ABD is in place with small amount of drainage present this morning.  Patient denies any shortness of breath, chest pain, dizziness/lightheadedness.  He tells me that his first bowel movement after surgery was yesterday.  He tells me his appetite is poor.  He was started on famotidine inpatient.  He denies any acid reflux symptoms.  He was also discharged on Seroquel, patient unaware of this, denies having any issues with cognition or sleep inpatient.  He did tell me he was frustrated when he was told that he could get up with assistance.  Per further review of daily progress notes there is documentation that patient was throwing his walker at staff and taking off his gait belt while trying to get up without assistance.  We discussed what to expect in the TCU.  Patient lives in a house with his partner .    Reviewed facility EMR including medications, recent nursing progress notes, vital signs.  Discussed with nursing staff plan of care as below.    CODE STATUS/ADVANCE DIRECTIVES DISCUSSION:  Full Code  CPR/Full code   ALLERGIES: No Known Allergies   PAST MEDICAL HISTORY:   Past Medical History:   Diagnosis Date    Basal cell  carcinoma     early 90's      PAST SURGICAL HISTORY:   has a past surgical history that includes Arthroplasty hip (Left, 11/17/2023).  FAMILY HISTORY: family history includes Heart Disease in his mother.  SOCIAL HISTORY:   reports that he has never smoked. He has never used smokeless tobacco. He reports current alcohol use. He reports that he does not use drugs.  Patient's living condition: lives with spouse    Post Discharge Medication Reconciliation Status:   MED REC REQUIRED  Post Medication Reconciliation Status:  Discharge medications reconciled and changed, see notes/orders       Current Outpatient Medications   Medication Sig    acetaminophen (TYLENOL) 325 MG tablet Take 3 tablets (975 mg) by mouth every 8 hours    Ascorbic Acid (VITAMIN C PO) Take 1,000 mg by mouth daily    atorvastatin (LIPITOR) 20 MG tablet Take 1 tablet (20 mg) by mouth daily    bisacodyl (DULCOLAX) 10 MG suppository Place 1 suppository (10 mg) rectally daily as needed for constipation (Use if Magnesium hydroxide (MILK of MAGNESIA) not effective after 24 hours. May discontinue if patient having bowel movement.)    calcium carbonate (TUMS) 500 MG chewable tablet Take 1 tablet (500 mg) by mouth 4 times daily as needed for heartburn    celecoxib (CELEBREX) 200 MG capsule Take 1 capsule (200 mg) by mouth daily    clopidogrel (PLAVIX) 75 MG tablet Take 1 tablet (75 mg) by mouth daily    Cyanocobalamin (VITAMIN B-12 PO) Take 1 tablet by mouth daily    famotidine (PEPCID) 20 MG tablet Take 1 tablet (20 mg) by mouth 2 times daily    lisinopril (ZESTRIL) 5 MG tablet Take 5 mg by mouth daily    magnesium hydroxide (MILK OF MAGNESIA) 400 MG/5ML suspension Take 30 mLs by mouth daily as needed for constipation (Use if preventive measures (senna-docusate, docusate, and polyethylene glycol) are not effective.)    melatonin 1 MG TABS tablet Take 1 tablet (1 mg) by mouth nightly as needed for sleep    methocarbamol (ROBAXIN) 500 MG tablet Take 1 tablet  "(500 mg) by mouth 4 times daily as needed for muscle spasms    multivitamin, therapeutic with minerals (THERA-VIT-M) TABS tablet Take 1 tablet by mouth daily    oxyCODONE (ROXICODONE) 5 MG tablet Take 1 tablet (5 mg) by mouth every 6 hours as needed for pain    polyethylene glycol (MIRALAX) 17 GM/Dose powder Take 17 g by mouth daily    QUEtiapine (SEROQUEL) 25 MG tablet Take 1 tablet (25 mg) by mouth 2 times daily as needed (agitation or aggression)    senna-docusate (SENOKOT-S/PERICOLACE) 8.6-50 MG tablet Take 1 tablet by mouth 2 times daily as needed for constipation    Vitamin D3 (CHOLECALCIFEROL) 25 mcg (1000 units) tablet Take 1,000 Units by mouth daily     No current facility-administered medications for this visit.       ROS:  10 point ROS of systems including Constitutional, Eyes, Respiratory, Cardiovascular, Gastroenterology, Genitourinary, Integumentary, Musculoskeletal, Psychiatric were all negative except for pertinent positives noted in my HPI.    Vitals:  /76   Pulse 78   Temp 98  F (36.7  C)   Resp 18   Ht 1.803 m (5' 11\")   Wt 93 kg (205 lb)   SpO2 95%   BMI 28.59 kg/m    Exam:  GENERAL APPEARANCE:  Alert, in NAD  HEENT: normocephalic, moist mucous membranes, nose without drainage or crusting  RESP:  respiratory effort normal, no respiratory distress, Lung sounds clear, patient is on RA  CV: auscultation of heart done, rate and rhythm regular.  ABDOMEN: + bowel sounds, soft, nontender, no grimacing or guarding with palpation.  M/S:  generalized left lower extremity edema; very limited ROM to left arm  SKIN: ABD in place over surgical site with small amount of drainage in place  NEURO: cranial nerves 2-12 grossly intact and at patient's baseline  PSYCH: oriented x 3, affect and mood normal      Lab/Diagnostic data:  Labs done in SNF are in [facility]. Please refer to them using EPIC/Care Everywhere. and Recent labs in EPIC reviewed by me today.     ASSESSMENT/PLAN:  Left femoral neck " fracture status post left hip arthroplasty on 11/17/2023  Fall, subsequent encounter  Pain 1/10 at time of visit  Plan: Add methocarbamol 500 mg QID PRN for spasms. Continue pain management with Tylenol 975 mg every 8 hours, Celebrex 200 mg daily, oxycodone 5 mg every 6 hours as needed.  Continue clopidogrel 75 mg daily for DVT prophylaxis. Continue famotidine BID for GI protection while taking celebrex Weightbearing as tolerated to left lower extremity.  Wound care as ordered.  Follow-up with Ortho in 2 weeks.    Left full thickness rotator cuff tear  Ortho consulted and recommended non operative management  Plan: Continue pain management as above. WBAT. Sling PRN. Daily Codman exercises. Follow up with shoulder specialist outpatient.    Acute blood loss anemia  Vitamin B12 deficiency  Secondary to surgery  Baseline hemoglobin 14  Hemoglobin 9.3 on 11/21/2023  Vitamin B12 level 342 on 7/10/23  Plan: CBC 11/28. Continue vitamin B12 1000 mcg daily.    Essential hypertension  BP controlled  Plan: Continue lisinopril 5 mg daily.  Monitor blood pressure.    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    GERD  Denies GERD symptoms  Plan: Continue famotidine 20 mg twice daily for GI protection while on Clopidogrel and celebrex and calcium carbonate 500 mg QID PRN.    Agitation  Started on seroquel inpatient, for episode of agitation  Patient denies any ongoing symptoms of agitation  Plan: Change Seroquel 25 mg BID PRN. Noted PRN orders for antipsychotic medications are limited to 14 days. Face to Face encounter done today. Evaluation of seroquel medication indicates without this medication the patient and staff safety is in jeopardy due to aggressive and agitated behavior that occurred inpatient. Discontinue if no further issues in the TCU.    Slow transit constipation  Bowels moved yesterday, discussed with patient importance of monitoring bowels closely while  taking opioids due for risk for constipation  Plan: Continue miralax daily, senna s 1 tab BID PRN, MOM PRN, bisacodyl as needed.  Monitor bowels.    Physical deconditioning  secondary to fall, fracture, surgery as above  Plan: Encourage participation in physical therapy/occupational therapy for strengthening and deconditioning. Discharge planning per their recommendation. Social work to assist with d/c planning.    Disclaimer: This note may contain text created using speech-recognition software and may contain unintended word substitutions.       Total time spent with patient visit at the skilled nursing facility was 46 min including patient visit, review of past records, and discussion with nursing staff.     Electronically signed by:  JORGE Peralta CNP

## 2023-11-22 NOTE — PLAN OF CARE
Occupational Therapy Discharge Summary    Reason for therapy discharge:    Discharged to transitional care facility.    Progress towards therapy goal(s). See goals on Care Plan in [facility] electronic health record for goal details.  Goals partially met.  Barriers to achieving goals:   discharge from facility.    Therapy recommendation(s):    Continued therapy is recommended.  Rationale/Recommendations:  recommending ongoing skilled OT at TCU to maximize indep.

## 2023-11-23 ENCOUNTER — LAB REQUISITION (OUTPATIENT)
Dept: LAB | Facility: CLINIC | Age: 78
End: 2023-11-23
Payer: COMMERCIAL

## 2023-11-23 DIAGNOSIS — Z11.1 ENCOUNTER FOR SCREENING FOR RESPIRATORY TUBERCULOSIS: ICD-10-CM

## 2023-11-24 ENCOUNTER — TELEPHONE (OUTPATIENT)
Dept: GERIATRICS | Facility: CLINIC | Age: 78
End: 2023-11-24
Payer: COMMERCIAL

## 2023-11-24 PROCEDURE — 36415 COLL VENOUS BLD VENIPUNCTURE: CPT | Mod: ORL | Performed by: NURSE PRACTITIONER

## 2023-11-24 PROCEDURE — 86481 TB AG RESPONSE T-CELL SUSP: CPT | Mod: ORL | Performed by: NURSE PRACTITIONER

## 2023-11-24 PROCEDURE — P9603 ONE-WAY ALLOW PRORATED MILES: HCPCS | Performed by: NURSE PRACTITIONER

## 2023-11-24 NOTE — TELEPHONE ENCOUNTER
[facility] Triage Nurse Telephone Encounter    Provider: JORGE Hall CNP  Facility: [facility]  Facility Type:  TCU    Caller: April  Call Back Number: [phone]    Allergies:  No Known Allergies     Reason for call: Nurse called to report that patient wants to discharge from TCU today.  Patient is walking around independently.  Patient just arrived to facility a couple days ago.  Patient doesn't feel he needs to be at the TCU since therapy does not occur on the weekends so he feels it is pointless to be at the facility.  Patient does not want home care services.  Patient does have an order for PRN Oxycodone but has not taken it at all.  Patient states he is leaving today regardless if it is AMA or with orders.      Verbal Order/Direction given by Provider: Let patient leave AMA as he just arrived a couple days ago and MD has not seen patient at all.      Provider giving Order:  Merle Stewart MD    Verbal Order given to: April    Maddy Adkins, RN

## 2023-11-26 LAB
QUANTIFERON MITOGEN: 5.63 IU/ML
QUANTIFERON NIL TUBE: 0.04 IU/ML
QUANTIFERON TB1 TUBE: 0.04 IU/ML
QUANTIFERON TB2 TUBE: 0.04

## 2023-11-27 ENCOUNTER — LAB REQUISITION (OUTPATIENT)
Dept: LAB | Facility: CLINIC | Age: 78
End: 2023-11-27
Payer: COMMERCIAL

## 2023-11-27 DIAGNOSIS — I10 ESSENTIAL (PRIMARY) HYPERTENSION: ICD-10-CM

## 2023-11-27 LAB
GAMMA INTERFERON BACKGROUND BLD IA-ACNC: 0.04 IU/ML
M TB IFN-G BLD-IMP: NEGATIVE
M TB IFN-G CD4+ BCKGRND COR BLD-ACNC: 5.59 IU/ML
MITOGEN IGNF BCKGRD COR BLD-ACNC: 0 IU/ML
MITOGEN IGNF BCKGRD COR BLD-ACNC: 0 IU/ML

## 2023-11-28 ENCOUNTER — OFFICE VISIT (OUTPATIENT)
Dept: FAMILY MEDICINE | Facility: CLINIC | Age: 78
End: 2023-11-28
Payer: COMMERCIAL

## 2023-11-28 VITALS
DIASTOLIC BLOOD PRESSURE: 62 MMHG | RESPIRATION RATE: 18 BRPM | HEART RATE: 87 BPM | TEMPERATURE: 97.7 F | OXYGEN SATURATION: 100 % | SYSTOLIC BLOOD PRESSURE: 122 MMHG | BODY MASS INDEX: 28.42 KG/M2 | HEIGHT: 71 IN | WEIGHT: 203 LBS

## 2023-11-28 DIAGNOSIS — S46.002D INJURY OF LEFT ROTATOR CUFF, SUBSEQUENT ENCOUNTER: ICD-10-CM

## 2023-11-28 DIAGNOSIS — S72.002D CLOSED FRACTURE OF NECK OF LEFT FEMUR WITH ROUTINE HEALING, SUBSEQUENT ENCOUNTER: Primary | ICD-10-CM

## 2023-11-28 LAB
ALBUMIN SERPL BCG-MCNC: 3.9 G/DL (ref 3.5–5.2)
ALP SERPL-CCNC: 94 U/L (ref 40–150)
ALT SERPL W P-5'-P-CCNC: 33 U/L (ref 0–70)
ANION GAP SERPL CALCULATED.3IONS-SCNC: 11 MMOL/L (ref 7–15)
AST SERPL W P-5'-P-CCNC: 27 U/L (ref 0–45)
BILIRUB SERPL-MCNC: 0.6 MG/DL
BUN SERPL-MCNC: 20.2 MG/DL (ref 8–23)
CALCIUM SERPL-MCNC: 9.4 MG/DL (ref 8.8–10.2)
CHLORIDE SERPL-SCNC: 105 MMOL/L (ref 98–107)
CREAT SERPL-MCNC: 1.09 MG/DL (ref 0.67–1.17)
DEPRECATED HCO3 PLAS-SCNC: 25 MMOL/L (ref 22–29)
EGFRCR SERPLBLD CKD-EPI 2021: 69 ML/MIN/1.73M2
ERYTHROCYTE [DISTWIDTH] IN BLOOD BY AUTOMATED COUNT: 12.5 % (ref 10–15)
GLUCOSE SERPL-MCNC: 76 MG/DL (ref 70–99)
HCT VFR BLD AUTO: 35.9 % (ref 40–53)
HGB BLD-MCNC: 11.4 G/DL (ref 13.3–17.7)
MCH RBC QN AUTO: 34.5 PG (ref 26.5–33)
MCHC RBC AUTO-ENTMCNC: 31.8 G/DL (ref 31.5–36.5)
MCV RBC AUTO: 109 FL (ref 78–100)
PLATELET # BLD AUTO: 449 10E3/UL (ref 150–450)
POTASSIUM SERPL-SCNC: 5 MMOL/L (ref 3.4–5.3)
PROT SERPL-MCNC: 6.4 G/DL (ref 6.4–8.3)
RBC # BLD AUTO: 3.3 10E6/UL (ref 4.4–5.9)
SODIUM SERPL-SCNC: 141 MMOL/L (ref 135–145)
WBC # BLD AUTO: 8.4 10E3/UL (ref 4–11)

## 2023-11-28 PROCEDURE — 99214 OFFICE O/P EST MOD 30 MIN: CPT | Performed by: NURSE PRACTITIONER

## 2023-11-28 PROCEDURE — 85027 COMPLETE CBC AUTOMATED: CPT | Performed by: NURSE PRACTITIONER

## 2023-11-28 PROCEDURE — 80053 COMPREHEN METABOLIC PANEL: CPT | Performed by: NURSE PRACTITIONER

## 2023-11-28 PROCEDURE — 36415 COLL VENOUS BLD VENIPUNCTURE: CPT | Performed by: NURSE PRACTITIONER

## 2023-11-28 RX ORDER — RESPIRATORY SYNCYTIAL VIRUS VACCINE 120MCG/0.5
0.5 KIT INTRAMUSCULAR ONCE
Qty: 1 EACH | Refills: 0 | Status: CANCELLED | OUTPATIENT
Start: 2023-11-28 | End: 2023-11-28

## 2023-11-28 RX ORDER — CYCLOBENZAPRINE HCL 5 MG
5 TABLET ORAL 3 TIMES DAILY PRN
Qty: 15 TABLET | Refills: 0 | Status: SHIPPED | OUTPATIENT
Start: 2023-11-28

## 2023-11-28 ASSESSMENT — PAIN SCALES - GENERAL: PAINLEVEL: MODERATE PAIN (5)

## 2023-11-28 ASSESSMENT — PATIENT HEALTH QUESTIONNAIRE - PHQ9
10. IF YOU CHECKED OFF ANY PROBLEMS, HOW DIFFICULT HAVE THESE PROBLEMS MADE IT FOR YOU TO DO YOUR WORK, TAKE CARE OF THINGS AT HOME, OR GET ALONG WITH OTHER PEOPLE: NOT DIFFICULT AT ALL
SUM OF ALL RESPONSES TO PHQ QUESTIONS 1-9: 7
SUM OF ALL RESPONSES TO PHQ QUESTIONS 1-9: 7

## 2023-11-28 NOTE — PROGRESS NOTES
Assessment & Plan     Closed fracture of neck of left femur with routine healing, subsequent encounter:  discussed continued use of tylenol on prn basis for pain, do not take more than 3,000 mg in 24 hour period, will prescribe 5 mg of flexeril to take at bedtime prn.  Did not have follow up scheduled with Dr. Estrada (surgeon) we called clinic and can be seen on 12/1 for follow up and staple removal.    - CBC with platelets  - Comprehensive metabolic panel  - cyclobenzaprine (FLEXERIL) 5 MG tablet  Dispense: 15 tablet; Refill: 0    Injury of left rotator cuff, subsequent encounter: continue to take tylenol for pain (see above), discussed application of ice for 30 min tid to help with pain.  Gave information to set up follow up with Dr. Fuentes (shoulder orthopedic specialist) to be seen to discuss treatment plan.     FUTURE APPOINTMENTS:       - Follow-up visit in 3 months to establish care.     Susan Haase, APRN CNP  [facility] AARON Nascimento is a 78 year old, presenting for the following health issues:  Hospital F/U (Left hip replacement follow up. )      11/28/2023     9:58 AM   Additional Questions   Roomed by Yasmeen Maguire CMA   Accompanied by Self       HPI       Hospital Follow-up Visit:    Hospital/Nursing Home/IP Rehab Facility: [facility]  Date of Admission: 11/16/2023  Date of Discharge: 11/22/2023  Reason(s) for Admission: Left Hip Closed Fracture- Left Hip Replacement    Was your hospitalization related to COVID-19? No   Problems taking medications regularly:  None  Medication changes since discharge: None  Problems adhering to non-medication therapy:  None    Summary of hospitalization:  [facility] discharge summary reviewed  Diagnostic Tests/Treatments reviewed.  Follow up needed: orthopedics for wound check and ortho for check of rotator cuff.   Other Healthcare Providers Involved in Patient s Care: orthopedics     Update since  "discharge: improved.   Left hip pain:  rates pain 5/10, no bandage on at this time, scant yellow drainage, staples intact.  Increased spasms at night go down the leg.  Taking tylenol 1000 mg tid, last dose 24 hours ago (he ran out of tylenol at home)  Rotator cuff tear:  limited ROM, significant pain if tries to move the shoulder.  Is to have follow up with shoulder specialist this week.     Plan of care communicated with patient     Review of Systems   CONSTITUTIONAL: NEGATIVE for fever, chills, change in weight  ENT/MOUTH: NEGATIVE for ear, mouth and throat problems  RESP: NEGATIVE for significant cough or SOB  CV: NEGATIVE for chest pain, palpitations or peripheral edema  MUSCULOSKELETAL: see HPI  NEURO: NEGATIVE for weakness, dizziness or paresthesias  PSYCHIATRIC: NEGATIVE for changes in mood or affect      Objective    /62 (BP Location: Right arm, Patient Position: Sitting, Cuff Size: Adult Regular)   Pulse 87   Temp 97.7  F (36.5  C) (Oral)   Resp 18   Ht 1.803 m (5' 11\")   Wt 92.1 kg (203 lb)   SpO2 100%   BMI 28.31 kg/m    Body mass index is 28.31 kg/m .  Physical Exam   GENERAL: healthy, alert and no distress  NECK: no adenopathy, no asymmetry, masses, or scars and thyroid normal to palpation  RESP: lungs clear to auscultation - no rales, rhonchi or wheezes  CV: regular rate and rhythm, normal S1 S2, no S3 or S4, no murmur, click or rub, LLE with 1+ peripheral edema  MS: LLE with staple intact to outer hip, no drainage.  Walking with a limp using a regular cane.  LLE with 1+ edema.  Left shoulder:  left arm with 20 degrees of flexion, significant ecchymosis along biceps and shoulder.    NEURO: LLE with normal strength,  mentation intact and speech normal            "

## 2023-11-28 NOTE — PATIENT INSTRUCTIONS
Left shoulder: follow-up with a shoulder specialist (Dr Fuentes or Dr Mckeon)   in 2-3 weeks for recheck and treatment plan.  [place] number:   [phone]

## 2024-08-10 ENCOUNTER — HEALTH MAINTENANCE LETTER (OUTPATIENT)
Age: 79
End: 2024-08-10

## 2025-06-04 ENCOUNTER — TELEPHONE (OUTPATIENT)
Dept: FAMILY MEDICINE | Facility: CLINIC | Age: 80
End: 2025-06-04
Payer: COMMERCIAL

## 2025-06-04 NOTE — TELEPHONE ENCOUNTER
Patient Quality Outreach    Patient is due for the following:   Physical Annual Wellness Visit    Action(s) Taken:   No follow up needed at this time.    Type of outreach:    Phone, left message for patient/parent to call back. and Sent [employer] message.    Questions for provider review:    None         Uriel Lewis, [facility]  Chart routed to None.

## 2025-08-16 ENCOUNTER — HEALTH MAINTENANCE LETTER (OUTPATIENT)
Age: 80
End: 2025-08-16

## (undated) DEVICE — SU ETHIBOND 5 V-37 4X30" MB66G

## (undated) DEVICE — SU MONOCRYL 3-0 PS-1 27" Y936H

## (undated) DEVICE — ESU ELEC BLADE 6" COATED E1450-6

## (undated) DEVICE — ESU PENCIL SMOKE EVAC W/ROCKER SWITCH 0703-047-000

## (undated) DEVICE — DRAPE STERI TOWEL LG 1010

## (undated) DEVICE — DRAPE CONVERTORS U-DRAPE 60X72" 8476

## (undated) DEVICE — HOOD FLYTE W/PEELAWAY 408-800-100

## (undated) DEVICE — GLOVE BIOGEL PI MICRO INDICATOR UNDERGLOVE SZ 7.5 48975

## (undated) DEVICE — GLOVE BIOGEL PI SZ 7.5 40875

## (undated) DEVICE — PACK TOTAL HIP RIDGES LATEX PO15HIFSG

## (undated) DEVICE — SU VICRYL 0 CT-1 27" J340H

## (undated) DEVICE — SET HANDPIECE INTERPULSE W/COAXIAL FAN SPRAY TIP 0210118000

## (undated) DEVICE — DRSG AQUACEL AG 3.5X13.75" HYDROFIBER 412012

## (undated) DEVICE — LINEN HALF SHEET 5512

## (undated) DEVICE — PREP CHLORAPREP 26ML TINTED HI-LITE ORANGE 930815

## (undated) DEVICE — LINEN ORTHO ACL PACK 5447

## (undated) DEVICE — BLADE SAW SAGITTAL STRK 18X90X1.27MM HD SYS 6 6118-127-090

## (undated) DEVICE — LINEN FULL SHEET 5511

## (undated) DEVICE — SU VICRYL+ 0 CT 36" DYED VCP358H

## (undated) DEVICE — BAG CLEAR TRASH 1.3M 39X33" P4040C

## (undated) DEVICE — SOL NACL 0.9% IRRIG 3000ML BAG 2B7477

## (undated) DEVICE — SUCTION MANIFOLD NEPTUNE 2 SYS 4 PORT 0702-020-000

## (undated) DEVICE — SU STRATAFIX PDS PLUS 1 CT-1 12" SXPP1A443

## (undated) DEVICE — SU VICRYL 2-0 CT-1 27" UND J259H

## (undated) RX ORDER — METHADONE HYDROCHLORIDE 10 MG/ML
INJECTION, SOLUTION INTRAMUSCULAR; INTRAVENOUS; SUBCUTANEOUS
Status: DISPENSED
Start: 2023-11-17

## (undated) RX ORDER — ONDANSETRON 2 MG/ML
INJECTION INTRAMUSCULAR; INTRAVENOUS
Status: DISPENSED
Start: 2023-11-17

## (undated) RX ORDER — DEXMEDETOMIDINE HYDROCHLORIDE 4 UG/ML
INJECTION, SOLUTION INTRAVENOUS
Status: DISPENSED
Start: 2023-11-17

## (undated) RX ORDER — ACETAMINOPHEN 325 MG/1
TABLET ORAL
Status: DISPENSED
Start: 2023-11-17

## (undated) RX ORDER — EPHEDRINE SULFATE 50 MG/ML
INJECTION, SOLUTION INTRAMUSCULAR; INTRAVENOUS; SUBCUTANEOUS
Status: DISPENSED
Start: 2023-11-17

## (undated) RX ORDER — GLYCOPYRROLATE 0.2 MG/ML
INJECTION INTRAMUSCULAR; INTRAVENOUS
Status: DISPENSED
Start: 2023-11-17

## (undated) RX ORDER — FENTANYL CITRATE-0.9 % NACL/PF 10 MCG/ML
PLASTIC BAG, INJECTION (ML) INTRAVENOUS
Status: DISPENSED
Start: 2023-11-17

## (undated) RX ORDER — SCOLOPAMINE TRANSDERMAL SYSTEM 1 MG/1
PATCH, EXTENDED RELEASE TRANSDERMAL
Status: DISPENSED
Start: 2023-11-17

## (undated) RX ORDER — CEFAZOLIN SODIUM/WATER 2 G/20 ML
SYRINGE (ML) INTRAVENOUS
Status: DISPENSED
Start: 2023-11-17

## (undated) RX ORDER — PROPOFOL 10 MG/ML
INJECTION, EMULSION INTRAVENOUS
Status: DISPENSED
Start: 2023-11-17

## (undated) RX ORDER — FENTANYL CITRATE 50 UG/ML
INJECTION, SOLUTION INTRAMUSCULAR; INTRAVENOUS
Status: DISPENSED
Start: 2023-11-17

## (undated) RX ORDER — MAGNESIUM SULFATE HEPTAHYDRATE 40 MG/ML
INJECTION, SOLUTION INTRAVENOUS
Status: DISPENSED
Start: 2023-11-17

## (undated) RX ORDER — LIDOCAINE HYDROCHLORIDE 10 MG/ML
INJECTION, SOLUTION EPIDURAL; INFILTRATION; INTRACAUDAL; PERINEURAL
Status: DISPENSED
Start: 2023-11-17

## (undated) RX ORDER — TRANEXAMIC ACID 10 MG/ML
INJECTION, SOLUTION INTRAVENOUS
Status: DISPENSED
Start: 2023-11-17

## (undated) RX ORDER — KETOROLAC TROMETHAMINE 30 MG/ML
INJECTION, SOLUTION INTRAMUSCULAR; INTRAVENOUS
Status: DISPENSED
Start: 2023-11-17

## (undated) RX ORDER — DEXAMETHASONE SODIUM PHOSPHATE 4 MG/ML
INJECTION, SOLUTION INTRA-ARTICULAR; INTRALESIONAL; INTRAMUSCULAR; INTRAVENOUS; SOFT TISSUE
Status: DISPENSED
Start: 2023-11-17